# Patient Record
Sex: FEMALE | Race: BLACK OR AFRICAN AMERICAN | NOT HISPANIC OR LATINO | Employment: OTHER | URBAN - METROPOLITAN AREA
[De-identification: names, ages, dates, MRNs, and addresses within clinical notes are randomized per-mention and may not be internally consistent; named-entity substitution may affect disease eponyms.]

---

## 2017-01-12 ENCOUNTER — OFFICE VISIT (OUTPATIENT)
Dept: INTERNAL MEDICINE | Facility: CLINIC | Age: 82
End: 2017-01-12
Payer: MEDICARE

## 2017-01-12 VITALS
OXYGEN SATURATION: 95 % | SYSTOLIC BLOOD PRESSURE: 122 MMHG | DIASTOLIC BLOOD PRESSURE: 76 MMHG | RESPIRATION RATE: 18 BRPM | HEIGHT: 63 IN | BODY MASS INDEX: 27.11 KG/M2 | HEART RATE: 58 BPM | WEIGHT: 153 LBS

## 2017-01-12 DIAGNOSIS — G30.9 ALZHEIMER'S DEMENTIA WITHOUT BEHAVIORAL DISTURBANCE, UNSPECIFIED TIMING OF DEMENTIA ONSET: ICD-10-CM

## 2017-01-12 DIAGNOSIS — M19.90 ARTHRITIS: ICD-10-CM

## 2017-01-12 DIAGNOSIS — I10 ESSENTIAL HYPERTENSION: ICD-10-CM

## 2017-01-12 DIAGNOSIS — K21.9 GASTROESOPHAGEAL REFLUX DISEASE, ESOPHAGITIS PRESENCE NOT SPECIFIED: Primary | ICD-10-CM

## 2017-01-12 DIAGNOSIS — F02.80 ALZHEIMER'S DEMENTIA WITHOUT BEHAVIORAL DISTURBANCE, UNSPECIFIED TIMING OF DEMENTIA ONSET: ICD-10-CM

## 2017-01-12 DIAGNOSIS — F32.A DEPRESSION, UNSPECIFIED DEPRESSION TYPE: ICD-10-CM

## 2017-01-12 PROCEDURE — 99214 OFFICE O/P EST MOD 30 MIN: CPT | Mod: 25,S$GLB,, | Performed by: INTERNAL MEDICINE

## 2017-01-12 PROCEDURE — G0008 ADMIN INFLUENZA VIRUS VAC: HCPCS | Mod: S$GLB,,, | Performed by: INTERNAL MEDICINE

## 2017-01-12 PROCEDURE — 90662 IIV NO PRSV INCREASED AG IM: CPT | Mod: S$GLB,,, | Performed by: INTERNAL MEDICINE

## 2017-01-12 RX ORDER — ESOMEPRAZOLE MAGNESIUM 40 MG/1
40 GRANULE, DELAYED RELEASE ORAL DAILY
COMMUNITY
End: 2017-01-12 | Stop reason: SDUPTHER

## 2017-01-12 RX ORDER — ESOMEPRAZOLE MAGNESIUM 40 MG/1
40 GRANULE, DELAYED RELEASE ORAL DAILY
Qty: 90 EACH | Refills: 3 | Status: SHIPPED | OUTPATIENT
Start: 2017-01-12 | End: 2017-04-11 | Stop reason: SDUPTHER

## 2017-01-12 RX ORDER — HYDROCODONE BITARTRATE AND ACETAMINOPHEN 10; 325 MG/1; MG/1
1 TABLET ORAL EVERY 4 HOURS PRN
Qty: 30 TABLET | Refills: 0 | Status: SHIPPED | OUTPATIENT
Start: 2017-01-12 | End: 2017-08-07

## 2017-01-12 RX ORDER — MEMANTINE HYDROCHLORIDE 10 MG/1
10 TABLET ORAL 2 TIMES DAILY
Qty: 180 TABLET | Refills: 1 | Status: SHIPPED | OUTPATIENT
Start: 2017-01-12 | End: 2017-06-27

## 2017-01-12 RX ORDER — LOSARTAN POTASSIUM 50 MG/1
50 TABLET ORAL DAILY
Qty: 90 TABLET | Refills: 3 | Status: SHIPPED | OUTPATIENT
Start: 2017-01-12 | End: 2017-04-11 | Stop reason: SDUPTHER

## 2017-01-12 RX ORDER — ESCITALOPRAM OXALATE 20 MG/1
20 TABLET ORAL DAILY
Qty: 90 TABLET | Refills: 3 | Status: SHIPPED | OUTPATIENT
Start: 2017-01-12 | End: 2017-04-11 | Stop reason: SDUPTHER

## 2017-01-12 RX ORDER — DONEPEZIL HYDROCHLORIDE 10 MG/1
10 TABLET, FILM COATED ORAL 2 TIMES DAILY
Qty: 180 TABLET | Refills: 0 | Status: SHIPPED | OUTPATIENT
Start: 2017-01-12 | End: 2017-02-08 | Stop reason: SDUPTHER

## 2017-01-12 RX ORDER — NAPROXEN SODIUM 220 MG/1
81 TABLET, FILM COATED ORAL DAILY
Start: 2017-01-12 | End: 2022-03-07

## 2017-01-12 NOTE — PROGRESS NOTES
"Subjective:      Patient ID: Lesa Holder is a 89 y.o. female.    Chief Complaint: Est PCP    HPI: 89y/oAAF, pt. Of Dr. Hdz, seen by Dr. Wallace in the past.  Presents to [facility].  I have reviewed her chart and labs.  She carries the DX: HTN,Depression,GERD.  At present has no complaints.  She walks with a cane for stability.    Review of Systems   Constitutional: Negative.    HENT: Negative.    Eyes: Negative.    Respiratory: Negative.    Cardiovascular: Negative.    Gastrointestinal: Negative.  Negative for anal bleeding and blood in stool.        GERD with significant nocturnal distress.   Endocrine: Negative.    Genitourinary: Negative.    Musculoskeletal: Positive for arthralgias.   Skin: Negative.    Neurological: Negative.    Hematological: Negative.    Psychiatric/Behavioral: Negative.        Objective:     Visit Vitals    /76 (BP Location: Right arm, Patient Position: Sitting, BP Method: Manual)    Pulse (!) 58    Resp 18    Ht 5' 3" (1.6 m)    Wt 69.4 kg (153 lb)    SpO2 95%    BMI 27.1 kg/m2       Physical Exam   Constitutional: She is oriented to person, place, and time. She appears well-developed and well-nourished.   HENT:   Head: Normocephalic and atraumatic.   Right Ear: External ear normal.   Left Ear: External ear normal.   Nose: Nose normal.   Mouth/Throat: Oropharynx is clear and moist.   Upper/lower dentures   Eyes: Conjunctivae are normal. Pupils are equal, round, and reactive to light.   Neck: Normal range of motion. Neck supple.   Cardiovascular: Normal rate, regular rhythm and normal heart sounds.    Pulmonary/Chest: Effort normal and breath sounds normal.   Abdominal: Soft. Bowel sounds are normal.   Neurological: She is alert and oriented to person, place, and time.   Skin: Skin is warm and dry.   Psychiatric: She has a normal mood and affect. Her behavior is normal.   Nursing note and vitals reviewed.      Assessment:     1. Gastroesophageal reflux disease, " esophagitis presence not specified    2. Essential hypertension    3. Depression, unspecified depression type    4. Alzheimer's dementia without behavioral disturbance, unspecified timing of dementia onset    5. Arthritis      Plan:     Gastroesophageal reflux disease, esophagitis presence not specified  -     esomeprazole (NEXIUM) 40 mg GrPS; Take 40 mg by mouth once daily.  Dispense: 90 each; Refill: 3    Essential hypertension  -     losartan (COZAAR) 50 MG tablet; Take 1 tablet (50 mg total) by mouth once daily.  Dispense: 90 tablet; Refill: 3  -     aspirin 81 MG Chew; Take 1 tablet (81 mg total) by mouth once daily.    Depression, unspecified depression type  -     escitalopram oxalate (LEXAPRO) 20 MG tablet; Take 1 tablet (20 mg total) by mouth once daily.  Dispense: 90 tablet; Refill: 3    Alzheimer's dementia without behavioral disturbance, unspecified timing of dementia onset  -     donepezil (ARICEPT) 10 MG tablet; Take 1 tablet (10 mg total) by mouth 2 (two) times daily.  Dispense: 180 tablet; Refill: 0  -     memantine (NAMENDA) 10 MG Tab; Take 1 tablet (10 mg total) by mouth 2 (two) times daily.  Dispense: 180 tablet; Refill: 1  -     aspirin 81 MG Chew; Take 1 tablet (81 mg total) by mouth once daily.    Arthritis  -     hydrocodone-acetaminophen 10-325mg (NORCO)  mg Tab; Take 1 tablet by mouth every 4 (four) hours as needed for Pain.  Dispense: 30 tablet; Refill: 0    Other orders  -     Influenza - High Dose (65+) (PF) (IM)  -     Influenza - High Dose (65+) (PF) (IM)

## 2017-01-12 NOTE — MR AVS SNAPSHOT
[facility]  [address],  [address]  [place] [address]  Phone: [phone]  Fax: [phone]                  Lesa Holder   2017 10:00 AM   Office Visit    Description:  Female : 10/8/1927   Provider:  Miriam Saunders MD   Department:  [facility]           Reason for Visit     Est PCP           Diagnoses this Visit        Comments    Gastroesophageal reflux disease, esophagitis presence not specified    -  Primary     Essential hypertension         Depression, unspecified depression type         Alzheimer's dementia without behavioral disturbance, unspecified timing of dementia onset         Arthritis                To Do List           Future Appointments        Provider Department Dept Phone    2017 2:40 PM Miriam Saunders MD [facility] [phone]      Goals (5 Years of Data)     None       These Medications        Disp Refills Start End    donepezil (ARICEPT) 10 MG tablet 180 tablet 0 2017    Take 1 tablet (10 mg total) by mouth 2 (two) times daily. - Oral    Pharmacy: [facility] [address] [place] [address] [address] [facility] [address] Ph #: [phone]       escitalopram oxalate (LEXAPRO) 20 MG tablet 90 tablet 3 2017     Take 1 tablet (20 mg total) by mouth once daily. - Oral    Pharmacy: [facility] [address] [place] [address] [facility] [address] Ph #: [phone]       esomeprazole (NEXIUM) 40 mg GrPS 90 each 3 2017     Take 40 mg by mouth once daily. - Oral    Pharmacy: [facility] [address] [place] [address] [address] [facility] [address] Ph #: [phone]       memantine (NAMENDA) 10 MG Tab 180 tablet 1 2017     Take 1 tablet (10 mg total) by mouth 2 (two) times daily. - Oral    Pharmacy: [facility] [address] [place] [address] [address] [facility] [address] Ph #: [phone]       Notes to Pharmacy: **Patient requests 90 days supply**    losartan (COZAAR) 50 MG tablet 90 tablet 3 1/12/2017     Take 1 tablet (50 mg total) by mouth once daily. - Oral    Pharmacy: [facility] [address] [place] [address] [facility] [address] Ph #: [phone]       aspirin 81 MG Chew   1/12/2017     Take 1 tablet (81 mg total) by mouth once daily. - Oral    Pharmacy: [facility] [place] [address] [facility]  [address] Ph #: [phone]       hydrocodone-acetaminophen 10-325mg (NORCO)  mg Tab 30 tablet 0 1/12/2017     Take 1 tablet by mouth every 4 (four) hours as needed for Pain. - Oral    Pharmacy: [facility] [place] [address] [facility] [address] Ph #: [phone]         [facility] On [facility]     [facility] On Call Nurse Care Line - 24/7 Assistance  Registered nurses in the [facility] On Call Center provide clinical advisement, health education, appointment booking, and other advisory services.  Call for this free service at [phone].             Medications           Message regarding Medications     Verify the changes and/or additions to your medication regime listed below are the same as discussed with your clinician today.  If any of these changes or additions are incorrect, please notify your healthcare provider.        START taking these NEW medications        Refills    esomeprazole (NEXIUM) 40 mg GrPS 3    Sig: Take 40 mg by mouth once daily.    Class: Normal    Route: Oral    aspirin 81 MG Chew     Sig: Take 1 tablet (81 mg total) by mouth once daily.    Class: No Print    Route: Oral      STOP taking these medications     PNV w/o calcium-iron fum-FA (M-VIT) 27-1 mg Tab Take by mouth.    potassium chloride (KLOR-CON) 10 MEQ TbSR TAKE 1 TABLET BY MOUTH EVERY DAY    ipratropium (ATROVENT) 0.06 % nasal spray 2 sprays by Nasal route 2 (two) times  "daily.           Verify that the below list of medications is an accurate representation of the medications you are currently taking.  If none reported, the list may be blank. If incorrect, please contact your healthcare provider. Carry this list with you in case of emergency.           Current Medications     benzonatate (TESSALON) 100 MG capsule Take 200 mg by mouth 2 (two) times daily as needed for Cough.     donepezil (ARICEPT) 10 MG tablet Take 1 tablet (10 mg total) by mouth 2 (two) times daily.    escitalopram oxalate (LEXAPRO) 20 MG tablet Take 1 tablet (20 mg total) by mouth once daily.    esomeprazole (NEXIUM) 40 mg GrPS Take 40 mg by mouth once daily.    hydrocodone-acetaminophen 10-325mg (NORCO)  mg Tab Take 1 tablet by mouth every 4 (four) hours as needed for Pain.    losartan (COZAAR) 50 MG tablet Take 1 tablet (50 mg total) by mouth once daily.    memantine (NAMENDA) 10 MG Tab Take 1 tablet (10 mg total) by mouth 2 (two) times daily.    aspirin 81 MG Chew Take 1 tablet (81 mg total) by mouth once daily.           Clinical Reference Information           Vital Signs - Last Recorded  Most recent update: 1/12/2017 10:00 AM by Sanjana Bowers LPN    BP Pulse Resp Ht Wt SpO2    122/76 (BP Location: Right arm, Patient Position: Sitting, BP Method: Manual) (!) 58 18 5' 3" (1.6 m) 69.4 kg (153 lb) 95%    BMI                27.1 kg/m2          Blood Pressure          Most Recent Value    BP  122/76      Allergies as of 1/12/2017     No Known Allergies      Immunizations Administered on Date of Encounter - 1/12/2017     Name Date Dose VIS Date Route    Influenza - High Dose  Incomplete 0.5 mL 8/7/2015 Intramuscular      Orders Placed During Today's Visit      Normal Orders This Visit    Influenza - High Dose (65+) (PF) (IM)       "

## 2017-02-08 ENCOUNTER — OFFICE VISIT (OUTPATIENT)
Dept: PSYCHIATRY | Facility: CLINIC | Age: 82
End: 2017-02-08
Payer: MEDICARE

## 2017-02-08 VITALS
DIASTOLIC BLOOD PRESSURE: 77 MMHG | HEART RATE: 70 BPM | WEIGHT: 150 LBS | SYSTOLIC BLOOD PRESSURE: 184 MMHG | HEIGHT: 63 IN | BODY MASS INDEX: 26.58 KG/M2

## 2017-02-08 DIAGNOSIS — F02.80 ALZHEIMER'S DEMENTIA WITHOUT BEHAVIORAL DISTURBANCE, UNSPECIFIED TIMING OF DEMENTIA ONSET: ICD-10-CM

## 2017-02-08 DIAGNOSIS — G30.9 ALZHEIMER'S DEMENTIA WITHOUT BEHAVIORAL DISTURBANCE, UNSPECIFIED TIMING OF DEMENTIA ONSET: ICD-10-CM

## 2017-02-08 DIAGNOSIS — F33.41 MAJOR DEPRESSIVE DISORDER, RECURRENT, IN PARTIAL REMISSION: Primary | ICD-10-CM

## 2017-02-08 PROCEDURE — 99213 OFFICE O/P EST LOW 20 MIN: CPT | Mod: PBBFAC | Performed by: PSYCHIATRY & NEUROLOGY

## 2017-02-08 PROCEDURE — 99214 OFFICE O/P EST MOD 30 MIN: CPT | Mod: S$PBB,,, | Performed by: PSYCHIATRY & NEUROLOGY

## 2017-02-08 PROCEDURE — 99999 PR PBB SHADOW E&M-EST. PATIENT-LVL III: CPT | Mod: PBBFAC,,, | Performed by: PSYCHIATRY & NEUROLOGY

## 2017-02-08 RX ORDER — IPRATROPIUM BROMIDE 42 UG/1
2 SPRAY, METERED NASAL 4 TIMES DAILY
COMMUNITY
End: 2017-08-07

## 2017-02-08 RX ORDER — DONEPEZIL HYDROCHLORIDE 10 MG/1
10 TABLET, FILM COATED ORAL 2 TIMES DAILY
Qty: 180 TABLET | Refills: 0 | Status: SHIPPED | OUTPATIENT
Start: 2017-02-08 | End: 2017-06-27 | Stop reason: SDUPTHER

## 2017-02-08 NOTE — PROGRESS NOTES
"Outpatient Psychiatry Follow-Up Visit (MD/NP)    2/8/2017    Clinical Status of Patient:  Outpatient (Ambulatory)    Chief Complaint:  Lesa Holder is a 89 y.o. female who presents today for follow-up of psychosis, depression, and dementia.     Met with patient and spouse.      Interval History and Content of Current Session:  Interim Events/Subjective Report/Content of Current Session:   Pt reports she has been doing pretty good.  She reports the holidays went well.  She reports her mood has been "pretty good" but not as it would normally be.  She describes isolating herself some.   She reports the death of her son about one year ago still affects her.  She denies spending a great deal of time in bed.  She denies crying.  Appetite is good.  She reports continued memory problems, not uncommonly where she put things.      She denies hallucinations or odd experiences when awake.       reports some days she is good and others not. She sometimes says there is a problem with the mattress.  He says she cooks some and they go out every other day to eat.  She disputes this and states she cooks every other day.      Though aricept was changed she continues to cough.  She has been diagnosed with GERD.          Psychotherapy:  · Target symptoms: depression, psychosis, memory loss  · Why chosen therapy is appropriate versus another modality: relevant to diagnosis  · Outcome monitoring methods: self-report, feedback from family  · Therapeutic intervention type: supportive psychotherapy  · Topics discussed/themes: stress related to medical comorbidities, life stage transitional issues  · The patient's response to the intervention is accepting. The patient's progress toward treatment goals is good.   · Duration of intervention: 10    Review of Systems   · PSYCHIATRIC: Pertinant items are noted in the narrative.  · CONSTITUTIONAL: No weight gain or loss.   · RESPIRATORY: No shortness of breath. Positive for " "cough.  · CARDIOVASCULAR: No tachycardia or chest pain.  · GASTROINTESTINAL: No nausea, vomiting, pain, constipation or diarrhea. Positive for diarrhea.  · GENITOURINARY: No frequency, dysuria or sexual dysfunction.    Past Medical, Family and Social History: The patient's past medical, family and social history have been reviewed and updated as appropriate within the electronic medical record - see encounter notes.    Compliance:  As above    Side effects: None    Risk Parameters:  Patient reports no suicidal ideation  Patient reports no homicidal ideation  Patient reports no self-injurious behavior  Patient reports no violent behavior    Exam (detailed: at least 9 elements; comprehensive: all 15 elements)   Constitutional  Vitals:  Most recent vital signs, dated less than 90 days prior to this appointment, were not reviewed.    Vitals:    02/08/17 1305   BP: (!) 184/77   Pulse: 70   Weight: 68 kg (150 lb)   Height: 5' 3" (1.6 m)      General:  age appropriate, neatly groomed, overweight     Musculoskeletal  Muscle Strength/Tone:  no dyskinesia, no dystonia, no tremor   Gait & Station:  uses cane     Psychiatric  Speech:  normal tone, normal rate, normal pitch, normal volume, no slurring, not pressured   Mood  Affect:  euthymic  mildly constricted but mostly bright   Thought Process:  goal-directed, logical   Associations:  intact   Thought Content:  normal, no suicidality, no homicidality, delusions, or paranoia   Insight  Judgement:  good  adequate to circumstances   Orientation:  Does not recall name of hospital. [facility].  Struggles with year but get it eventually.  Not oriented to month.  Oriented to day of the week and time of day   Memory: recalls cereal this AM but cannot remember "Cherios"   Language: grossly intact   Attention Span & Concentration:  able to focus   Fund of Knowledge:  intact and appropriate to age and level of education     Assessment and Diagnosis   Status/Progress: Based on the " examination today, the patient's problem(s) is/are fairly inadequately controlled.  New problems have not been presented today.   Comorbidities are complicating management of the primary condition.  There are no active rule-out diagnoses for this patient at this time.    General Impression: the patient had no prior psychiatric history [facility]  During that time she was confused and depressed.  Subsequent to her discharge an attempt was made to take her off of antipsychotic medication which resulted in her developing delusions of that she was sexually arousing strange men.  Currently she is on medication regimen that includes antidepressants as well as memory preserving medications.    Diagnosis:  Hansel. 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  hyperlipidemia, hypertension, leg pain        Intervention/Counseling/Treatment Plan   · Continue Aricept 10 mg twice daily  · Continue Lexapro 20 mg daily  · Continue Namenda 10 mg twice daily.          Return to Clinic: 6 months

## 2017-04-11 ENCOUNTER — OFFICE VISIT (OUTPATIENT)
Dept: INTERNAL MEDICINE | Facility: CLINIC | Age: 82
End: 2017-04-11
Payer: MEDICARE

## 2017-04-11 VITALS
WEIGHT: 149.25 LBS | BODY MASS INDEX: 29.3 KG/M2 | HEIGHT: 60 IN | SYSTOLIC BLOOD PRESSURE: 130 MMHG | OXYGEN SATURATION: 97 % | TEMPERATURE: 98 F | DIASTOLIC BLOOD PRESSURE: 80 MMHG | HEART RATE: 66 BPM | RESPIRATION RATE: 16 BRPM

## 2017-04-11 DIAGNOSIS — F32.A DEPRESSION, UNSPECIFIED DEPRESSION TYPE: ICD-10-CM

## 2017-04-11 DIAGNOSIS — G30.9 ALZHEIMER'S DEMENTIA WITHOUT BEHAVIORAL DISTURBANCE, UNSPECIFIED TIMING OF DEMENTIA ONSET: ICD-10-CM

## 2017-04-11 DIAGNOSIS — I10 ESSENTIAL HYPERTENSION: Primary | ICD-10-CM

## 2017-04-11 DIAGNOSIS — E78.5 HYPERLIPIDEMIA, UNSPECIFIED HYPERLIPIDEMIA TYPE: ICD-10-CM

## 2017-04-11 DIAGNOSIS — K21.9 GASTROESOPHAGEAL REFLUX DISEASE, ESOPHAGITIS PRESENCE NOT SPECIFIED: ICD-10-CM

## 2017-04-11 DIAGNOSIS — F02.80 ALZHEIMER'S DEMENTIA WITHOUT BEHAVIORAL DISTURBANCE, UNSPECIFIED TIMING OF DEMENTIA ONSET: ICD-10-CM

## 2017-04-11 PROCEDURE — 99214 OFFICE O/P EST MOD 30 MIN: CPT | Mod: S$GLB,,, | Performed by: INTERNAL MEDICINE

## 2017-04-11 RX ORDER — ESCITALOPRAM OXALATE 20 MG/1
20 TABLET ORAL DAILY
Qty: 90 TABLET | Refills: 3 | Status: SHIPPED | OUTPATIENT
Start: 2017-04-11 | End: 2018-03-08 | Stop reason: SDUPTHER

## 2017-04-11 RX ORDER — ESOMEPRAZOLE MAGNESIUM 40 MG/1
40 GRANULE, DELAYED RELEASE ORAL DAILY
Qty: 90 EACH | Refills: 3 | Status: SHIPPED | OUTPATIENT
Start: 2017-04-11 | End: 2017-08-07

## 2017-04-11 RX ORDER — ESOMEPRAZOLE MAGNESIUM 40 MG/1
1 CAPSULE, DELAYED RELEASE ORAL DAILY
Refills: 3 | COMMUNITY
Start: 2017-01-12 | End: 2018-04-09 | Stop reason: SDUPTHER

## 2017-04-11 RX ORDER — LOSARTAN POTASSIUM 50 MG/1
50 TABLET ORAL DAILY
Qty: 90 TABLET | Refills: 3 | Status: SHIPPED | OUTPATIENT
Start: 2017-04-11 | End: 2018-11-02 | Stop reason: SDUPTHER

## 2017-04-11 NOTE — PROGRESS NOTES
Subjective:      Patient ID: Lesa Holder is a 89 y.o. female.    Chief Complaint: Cough (pt c/o coughing); Nasal Congestion; and Gastroesophageal Reflux (acid reflux)    HPI: 89y/oAAF, here with her .  He puts her meds out, because she has memory impairment.  However, he states that she sometimes does not take her Nexium, and then has heartburn with backwash  Which makes her cough. Cough is not productive or debilitating.  No CP,palpiatations,SOB,wheezing.  She does not want to take ANY MORE pills than she is.  She does her own cooking and house cleaning, but likes to sit in her recliner.  03/28/13 0922 HDL 66 - Final result   03/28/13 0922 CHOL 229 High Final result   03/28/13 0922 TRIG 77 - Final result   03/28/13 0922 LDLCALC 148.0 - Final result   03/28/13 0922 CHOLHDL 28.8 - Final result   03/28/13 0922 TOTALCHOLEST 3.5 - Final result   03/01/17 1118 COLORU Yellow - Final result   03/01/17 1118 APPEARANCEUA Clear - Final result   03/01/17 1118 SPECGRAV 1.025 - Final result   03/01/17 1118 PHUR 6.0 - Final result   03/01/17 1118 KETONESU Trace Abnormal Final result   03/01/17 1118 OCCULTUA Trace Abnormal Final result   03/01/17 1118 NITRITE Negative - Final result   03/01/17 1118 UROBILINOGEN Negative - Final result   08/12/16 1723 INR 1.1 - Final result   03/01/17 1118 LEUKOCYTESUR Trace Abnormal Final result   03/01/17 1118 WBC 5.86 - Final result   03/01/17 1118 RBC 4.08 - Final result   03/01/17 1118 HGB 12.0 - Final result   03/01/17 1118 HCT 36.9 Low Final result   03/01/17 1118 MCH 29.4 - Final result   03/01/17 1118 RDW 13.4 - Final result   03/01/17 1118  - Final result   03/01/17 1118 MPV 9.0 Low Final result   03/01/17 1118 GLU 87 - Final result   03/01/17 1118 BUN 19 High Final result   03/01/17 1118 CREATININE 0.82 - Final result   03/01/17 1118 CALCIUM 9.6 - Final result   03/01/17 1118  - Final result   03/01/17 1118 K 4.2 - Final result   03/01/17 1118  - Final result    03/01/17 1118 PROT 7.7 - Final result   03/01/17 1118 ALBUMIN 3.9 - Final result   03/01/17 1118 BILITOT 0.6 - Final result   03/01/17 1118 AST 31 - Final result   03/01/17 1118 ALKPHOS 91 - Final result   03/01/17 1118 CO2 30 High Final result   03/01/17 1118 ALT 19 - Final result   03/01/17 1118 ANIONGAP 10 - Final result   03/01/17 1118 EGFRNONAA >60.0 - Final result   03/01/17 1118 ESTGFRAFRICA >60.0 - Final result   09/24/11 1944 MG 2.3 - Final result   03/01/17 1118           Review of Systems   Constitutional: Negative.    HENT: Negative.    Eyes: Negative.    Respiratory: Negative.    Cardiovascular: Negative.    Gastrointestinal: Negative.    Endocrine: Negative.    Genitourinary: Negative.    Musculoskeletal:        Legs hurt sometimes   Allergic/Immunologic: Negative.    Neurological: Negative.    Psychiatric/Behavioral: Positive for confusion and decreased concentration. Negative for agitation, behavioral problems, hallucinations, self-injury, sleep disturbance and suicidal ideas. The patient is not hyperactive.        Objective:   /80 (BP Location: Left arm, Patient Position: Sitting, BP Method: Manual)  Pulse 66  Temp 98.2 °F (36.8 °C) (Oral)   Resp 16  Ht 5' (1.524 m)  Wt 67.7 kg (149 lb 4 oz)  SpO2 97%  BMI 29.15 kg/m2    Physical Exam   Constitutional: She is oriented to person, place, and time. She appears well-developed and well-nourished.   HENT:   Head: Normocephalic and atraumatic.   Right Ear: External ear normal.   Left Ear: External ear normal.   Nose: Nose normal.   Mouth/Throat: Oropharynx is clear and moist.   Eyes: Conjunctivae are normal. Pupils are equal, round, and reactive to light.   Neck: Normal range of motion. Neck supple.   Cardiovascular: Normal rate, regular rhythm and normal heart sounds.    Pulmonary/Chest: Effort normal and breath sounds normal.   Abdominal: Soft. Bowel sounds are normal.   Musculoskeletal: She exhibits no edema or tenderness.   Neurological:  She is alert and oriented to person, place, and time.   Skin: Skin is warm and dry.   Psychiatric: She has a normal mood and affect. Her behavior is normal.   Nursing note and vitals reviewed.      Assessment:     1. Essential hypertension    2. Gastroesophageal reflux disease, esophagitis presence not specified    3. Depression, unspecified depression type    4. Hyperlipidemia, unspecified hyperlipidemia type    5. Alzheimer's dementia without behavioral disturbance, unspecified timing of dementia onset      Plan:     Essential hypertension  -     losartan (COZAAR) 50 MG tablet; Take 1 tablet (50 mg total) by mouth once daily.  Dispense: 90 tablet; Refill: 3    Gastroesophageal reflux disease, esophagitis presence not specified  -     esomeprazole (NEXIUM) 40 mg GrPS; Take 40 mg by mouth once daily.  Dispense: 90 each; Refill: 3    Depression, unspecified depression type  -     escitalopram oxalate (LEXAPRO) 20 MG tablet; Take 1 tablet (20 mg total) by mouth once daily.  Dispense: 90 tablet; Refill: 3    Hyperlipidemia, unspecified hyperlipidemia type    Alzheimer's dementia without behavioral disturbance, unspecified timing of dementia onset      Pt. Does not want to take any more pills than she is now.

## 2017-04-11 NOTE — MR AVS SNAPSHOT
[facility]  [address],  [address]  [place] [address]  Phone: [phone]  Fax: [phone]                  Lesa Holder   2017 2:40 PM   Office Visit    Description:  Female : 10/8/1927   Provider:  Miriam Saunders MD   Department:  [facility]           Reason for Visit     Cough     Nasal Congestion     Gastroesophageal Reflux           Diagnoses this Visit        Comments    Essential hypertension         Gastroesophageal reflux disease, esophagitis presence not specified         Depression, unspecified depression type                To Do List           Future Appointments        Provider Department Dept Phone    2017 2:00 PM Miriam Saunders MD [facility] [phone]      Goals (5 Years of Data)     None       These Medications        Disp Refills Start End    losartan (COZAAR) 50 MG tablet 90 tablet 3 2017     Take 1 tablet (50 mg total) by mouth once daily. - Oral    Pharmacy: [facility] [place] [address] [address] [facility]  [address] Ph #: [phone]       esomeprazole (NEXIUM) 40 mg GrPS 90 each 3 2017     Take 40 mg by mouth once daily. - Oral    Pharmacy: [facility] [place] [address] [address] [facility] [address] Ph #: [phone]       escitalopram oxalate (LEXAPRO) 20 MG tablet 90 tablet 3 2017     Take 1 tablet (20 mg total) by mouth once daily. - Oral    Pharmacy: [facility] [place] [address] [address] AT [facility] [address] Ph #: [phone]         [facility]     [facility] Nurse Care Line -  Assistance  Unless otherwise directed by your provider, please contact [facility], our nurse care line that is available for  assistance.     Registered nurses in the [facility] provide: appointment scheduling, clinical advisement, health  education, and other advisory services.  Call: [phone] (toll free)               Medications           Message regarding Medications     Verify the changes and/or additions to your medication regime listed below are the same as discussed with your clinician today.  If any of these changes or additions are incorrect, please notify your healthcare provider.             Verify that the below list of medications is an accurate representation of the medications you are currently taking.  If none reported, the list may be blank. If incorrect, please contact your healthcare provider. Carry this list with you in case of emergency.           Current Medications     aspirin 81 MG Chew Take 1 tablet (81 mg total) by mouth once daily.    benzonatate (TESSALON) 100 MG capsule Take 200 mg by mouth 2 (two) times daily as needed for Cough.     donepezil (ARICEPT) 10 MG tablet Take 1 tablet (10 mg total) by mouth 2 (two) times daily.    escitalopram oxalate (LEXAPRO) 20 MG tablet Take 1 tablet (20 mg total) by mouth once daily.    esomeprazole (NEXIUM) 40 MG capsule Take 1 capsule by mouth Daily.    esomeprazole (NEXIUM) 40 mg GrPS Take 40 mg by mouth once daily.    hydrocodone-acetaminophen 10-325mg (NORCO)  mg Tab Take 1 tablet by mouth every 4 (four) hours as needed for Pain.    ipratropium (ATROVENT) 0.06 % nasal spray 2 sprays by Nasal route 4 (four) times daily.    losartan (COZAAR) 50 MG tablet Take 1 tablet (50 mg total) by mouth once daily.    memantine (NAMENDA) 10 MG Tab Take 1 tablet (10 mg total) by mouth 2 (two) times daily.           Clinical Reference Information           Your Vitals Were     BP Pulse Temp Resp Height Weight    130/80 (BP Location: Left arm, Patient Position: Sitting, BP Method: Manual) 66 98.2 °F (36.8 °C) (Oral) 16 5' (1.524 m) 67.7 kg (149 lb 4 oz)    SpO2 BMI             97% 29.15 kg/m2         Blood Pressure          Most Recent Value    BP  130/80      Allergies as of 4/11/2017      No Known Allergies      Immunizations Administered on Date of Encounter - 4/11/2017     None      [facility] Sign-Up     Activating your [facility] account is as easy as 1-2-3!     1) Visit [email or web address], select Sign Up Now, enter this activation code and your date of birth, then select Next.  Activation code not generated  Current Patient Portal Status: Account disabled      2) Create a username and password to use when you visit [facility] in the future and select a security question in case you lose your password and select Next.    3) Enter your e-mail address and click Sign Up!    Additional Information  If you have questions, please e-mail [email or web address] or call [phone] to talk to our [facility] staff. Remember, [employer] is NOT to be used for urgent needs. For medical emergencies, dial 911.         Language Assistance Services     ATTENTION: Language assistance services are available, free of charge. Please call [phone].      ATENCIÓN: Si pernell saul, tiene a decker disposición servicios gratuitos de asistencia lingüística. Llame al [phone].     [facility] Ý: N?u b?n nói Ti?ng Vi?t, có các d?ch v? h? tr? ngôn ng? mi?n phí dành cho b?n. G?i s? [phone].         [facility] complies with applicable Federal civil rights laws and does not discriminate on the basis of race, color, national origin, age, disability, or sex.

## 2017-06-26 RX ORDER — MEMANTINE HYDROCHLORIDE 10 MG/1
TABLET ORAL
Qty: 60 TABLET | Refills: 0 | Status: SHIPPED | OUTPATIENT
Start: 2017-06-26 | End: 2017-06-26 | Stop reason: SDUPTHER

## 2017-06-26 RX ORDER — MEMANTINE HYDROCHLORIDE 10 MG/1
TABLET ORAL
Qty: 180 TABLET | Refills: 0 | Status: SHIPPED | OUTPATIENT
Start: 2017-06-26 | End: 2017-06-27 | Stop reason: SDUPTHER

## 2017-06-27 ENCOUNTER — OFFICE VISIT (OUTPATIENT)
Dept: PSYCHIATRY | Facility: CLINIC | Age: 82
End: 2017-06-27
Payer: MEDICARE

## 2017-06-27 VITALS
HEIGHT: 60 IN | SYSTOLIC BLOOD PRESSURE: 171 MMHG | DIASTOLIC BLOOD PRESSURE: 117 MMHG | WEIGHT: 152 LBS | HEART RATE: 84 BPM | BODY MASS INDEX: 29.84 KG/M2

## 2017-06-27 DIAGNOSIS — F02.80 ALZHEIMER'S DEMENTIA WITHOUT BEHAVIORAL DISTURBANCE, UNSPECIFIED TIMING OF DEMENTIA ONSET: ICD-10-CM

## 2017-06-27 DIAGNOSIS — F33.41 MAJOR DEPRESSIVE DISORDER, RECURRENT, IN PARTIAL REMISSION: Primary | ICD-10-CM

## 2017-06-27 DIAGNOSIS — G30.9 ALZHEIMER'S DEMENTIA WITHOUT BEHAVIORAL DISTURBANCE, UNSPECIFIED TIMING OF DEMENTIA ONSET: ICD-10-CM

## 2017-06-27 PROCEDURE — 99999 PR PBB SHADOW E&M-EST. PATIENT-LVL II: CPT | Mod: PBBFAC,,, | Performed by: PSYCHIATRY & NEUROLOGY

## 2017-06-27 PROCEDURE — 1159F MED LIST DOCD IN RCRD: CPT | Mod: ,,, | Performed by: PSYCHIATRY & NEUROLOGY

## 2017-06-27 PROCEDURE — 99212 OFFICE O/P EST SF 10 MIN: CPT | Mod: PBBFAC | Performed by: PSYCHIATRY & NEUROLOGY

## 2017-06-27 PROCEDURE — 99214 OFFICE O/P EST MOD 30 MIN: CPT | Mod: S$PBB,,, | Performed by: PSYCHIATRY & NEUROLOGY

## 2017-06-27 RX ORDER — MEMANTINE HYDROCHLORIDE 10 MG/1
10 TABLET ORAL 2 TIMES DAILY
Qty: 180 TABLET | Refills: 1 | Status: SHIPPED | OUTPATIENT
Start: 2017-06-27 | End: 2017-11-07 | Stop reason: SDUPTHER

## 2017-06-27 RX ORDER — DONEPEZIL HYDROCHLORIDE 10 MG/1
10 TABLET, FILM COATED ORAL 2 TIMES DAILY
Qty: 180 TABLET | Refills: 1 | Status: SHIPPED | OUTPATIENT
Start: 2017-06-27 | End: 2017-11-07 | Stop reason: SDUPTHER

## 2017-06-27 NOTE — PROGRESS NOTES
Outpatient Psychiatry Follow-Up Visit (MD/NP)    6/27/2017    Clinical Status of Patient:  Outpatient (Ambulatory)    Chief Complaint:  Lesa Holder is a 89 y.o. female who presents today for follow-up of psychosis, depression, and dementia.     Met with patient and spouse.      Interval History and Content of Current Session:  Interim Events/Subjective Report/Content of Current Session:    Pt report she has been doing well but reports her memory is still giving her problems at times.  She reports she sometimes forgets the date.  She reports her mood is pretty good and this is corroborated by her .  She denies crying and feeling sad.  She reports she spends time doing household chores, [employer], and going out to eat.  Appetite is good.  Sleep is impaired because of reflux and coughing.  She still has some pain from arthritis.        Psychotherapy:  · Target symptoms: depression, psychosis, memory loss  · Why chosen therapy is appropriate versus another modality: relevant to diagnosis  · Outcome monitoring methods: self-report, feedback from family  · Therapeutic intervention type: supportive psychotherapy  · Topics discussed/themes: stress related to medical comorbidities, life stage transitional issues  · The patient's response to the intervention is accepting. The patient's progress toward treatment goals is good.   · Duration of intervention: 5 mins    Review of Systems   · PSYCHIATRIC: Pertinant items are noted in the narrative.  · CONSTITUTIONAL: No weight gain or loss.   · RESPIRATORY: No shortness of breath. Positive for cough.  · CARDIOVASCULAR: No tachycardia or chest pain.  · GASTROINTESTINAL: No nausea, vomiting, pain, constipation or diarrhea.  · GENITOURINARY: No frequency, dysuria or sexual dysfunction.    Past Medical, Family and Social History: The patient's past medical, family and social history have been reviewed and updated as appropriate within the electronic medical record - see  encounter notes.    Compliance:  As above    Side effects: None    Risk Parameters:  Patient reports no suicidal ideation  Patient reports no homicidal ideation  Patient reports no self-injurious behavior  Patient reports no violent behavior    Exam (detailed: at least 9 elements; comprehensive: all 15 elements)   Constitutional  Vitals:  Most recent vital signs, dated less than 90 days prior to this appointment, were not reviewed.    Vitals:    06/27/17 1553   BP: (!) 171/117   Pulse: 84   Weight: 68.9 kg (152 lb)   Height: 5' (1.524 m)      General:  age appropriate, neatly groomed, overweight     Musculoskeletal  Muscle Strength/Tone:  no dyskinesia, no dystonia, no tremor   Gait & Station:  uses cane     Psychiatric  Speech:  normal tone, normal rate, normal pitch, normal volume, no slurring, not pressured   Mood:   Affect:  euthymic  mildly constricted but mostly bright   Thought Process:  goal-directed, logical   Associations:  intact   Thought Content:  normal, no suicidality, no homicidality, delusions, or paranoia   Insight  Judgement:  good  adequate to circumstances   Orientation:  person, place, situation   Memory: intact for some recent events   Language: grossly intact   Attention Span & Concentration:  able to focus   Fund of Knowledge:  intact and appropriate to age and level of education     Assessment and Diagnosis   Status/Progress: Based on the examination today, the patient's problem(s) is/are fairly inadequately controlled.  New problems have not been presented today.   Comorbidities are complicating management of the primary condition.  There are no active rule-out diagnoses for this patient at this time.    General Impression: the patient had no prior psychiatric history before her admission to [facility] in 2011.  During that time she was confused and depressed.  Subsequent to her discharge an attempt was made to take her off of antipsychotic medication which resulted in her developing  delusions of that she was sexually arousing strange men.  Currently she is on medication regimen that includes antidepressants as well as memory preserving medications.    Diagnosis:  Hansel. 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  hyperlipidemia, hypertension, leg pain        Intervention/Counseling/Treatment Plan   · Continue Aricept 10 mg twice daily  · Continue Lexapro 20 mg daily  · Continue Namenda 10 mg twice daily.    · Recommneded an urgent care with PCP if BP does not return to baseline.         Return to Clinic: 6 months

## 2017-08-07 ENCOUNTER — OFFICE VISIT (OUTPATIENT)
Dept: INTERNAL MEDICINE | Facility: CLINIC | Age: 82
End: 2017-08-07
Payer: MEDICARE

## 2017-08-07 VITALS
BODY MASS INDEX: 30.43 KG/M2 | HEART RATE: 72 BPM | RESPIRATION RATE: 16 BRPM | DIASTOLIC BLOOD PRESSURE: 74 MMHG | TEMPERATURE: 99 F | HEIGHT: 60 IN | OXYGEN SATURATION: 97 % | SYSTOLIC BLOOD PRESSURE: 120 MMHG | WEIGHT: 155 LBS

## 2017-08-07 DIAGNOSIS — I10 ESSENTIAL HYPERTENSION: Primary | ICD-10-CM

## 2017-08-07 DIAGNOSIS — G30.9 ALZHEIMER'S DEMENTIA WITHOUT BEHAVIORAL DISTURBANCE, UNSPECIFIED TIMING OF DEMENTIA ONSET: ICD-10-CM

## 2017-08-07 DIAGNOSIS — F02.80 ALZHEIMER'S DEMENTIA WITHOUT BEHAVIORAL DISTURBANCE, UNSPECIFIED TIMING OF DEMENTIA ONSET: ICD-10-CM

## 2017-08-07 PROCEDURE — 3008F BODY MASS INDEX DOCD: CPT | Mod: S$GLB,,, | Performed by: INTERNAL MEDICINE

## 2017-08-07 PROCEDURE — 99213 OFFICE O/P EST LOW 20 MIN: CPT | Mod: S$GLB,,, | Performed by: INTERNAL MEDICINE

## 2017-08-07 PROCEDURE — 1159F MED LIST DOCD IN RCRD: CPT | Mod: S$GLB,,, | Performed by: INTERNAL MEDICINE

## 2017-08-07 PROCEDURE — 1125F AMNT PAIN NOTED PAIN PRSNT: CPT | Mod: S$GLB,,, | Performed by: INTERNAL MEDICINE

## 2017-08-07 RX ORDER — BENZONATATE 200 MG/1
CAPSULE ORAL
COMMUNITY
Start: 2017-07-26 | End: 2017-08-07

## 2017-09-18 DIAGNOSIS — I10 ESSENTIAL HYPERTENSION: ICD-10-CM

## 2017-09-18 RX ORDER — LOSARTAN POTASSIUM 50 MG/1
TABLET ORAL
Qty: 30 TABLET | Refills: 11 | Status: SHIPPED | OUTPATIENT
Start: 2017-09-18 | End: 2017-11-07 | Stop reason: SDUPTHER

## 2017-11-07 ENCOUNTER — OFFICE VISIT (OUTPATIENT)
Dept: INTERNAL MEDICINE | Facility: CLINIC | Age: 82
End: 2017-11-07
Payer: MEDICARE

## 2017-11-07 VITALS
SYSTOLIC BLOOD PRESSURE: 120 MMHG | BODY MASS INDEX: 30.09 KG/M2 | WEIGHT: 153.25 LBS | OXYGEN SATURATION: 98 % | HEIGHT: 60 IN | HEART RATE: 65 BPM | RESPIRATION RATE: 18 BRPM | DIASTOLIC BLOOD PRESSURE: 64 MMHG

## 2017-11-07 DIAGNOSIS — G30.9 ALZHEIMER'S DEMENTIA WITHOUT BEHAVIORAL DISTURBANCE, UNSPECIFIED TIMING OF DEMENTIA ONSET: ICD-10-CM

## 2017-11-07 DIAGNOSIS — F02.80 ALZHEIMER'S DEMENTIA WITHOUT BEHAVIORAL DISTURBANCE, UNSPECIFIED TIMING OF DEMENTIA ONSET: ICD-10-CM

## 2017-11-07 DIAGNOSIS — K21.9 GASTROESOPHAGEAL REFLUX DISEASE, ESOPHAGITIS PRESENCE NOT SPECIFIED: Primary | ICD-10-CM

## 2017-11-07 DIAGNOSIS — R05.3 COUGH, PERSISTENT: ICD-10-CM

## 2017-11-07 PROCEDURE — 99214 OFFICE O/P EST MOD 30 MIN: CPT | Mod: S$GLB,,, | Performed by: INTERNAL MEDICINE

## 2017-11-07 RX ORDER — DONEPEZIL HYDROCHLORIDE 10 MG/1
10 TABLET, FILM COATED ORAL 2 TIMES DAILY
Qty: 180 TABLET | Refills: 1 | Status: SHIPPED | OUTPATIENT
Start: 2017-11-07 | End: 2019-01-07 | Stop reason: SDUPTHER

## 2017-11-07 RX ORDER — SUCRALFATE 1 G/10ML
1 SUSPENSION ORAL NIGHTLY
Qty: 414 ML | Refills: 3 | Status: SHIPPED | OUTPATIENT
Start: 2017-11-07 | End: 2018-02-07

## 2017-11-07 RX ORDER — MEMANTINE HYDROCHLORIDE 10 MG/1
10 TABLET ORAL 2 TIMES DAILY
Qty: 180 TABLET | Refills: 1 | Status: SHIPPED | OUTPATIENT
Start: 2017-11-07 | End: 2018-03-08 | Stop reason: SDUPTHER

## 2017-11-07 NOTE — PATIENT INSTRUCTIONS
Taking Your Blood Pressure  Blood pressure is the force of blood against the artery wall as it moves from the heart through the blood vessels. You can take your own blood pressure reading using a digital monitor. Take your readings the same each time, using the same arm. Take readings as often as your healthcare provider instructs.  About blood pressure monitors  Blood pressure monitors are designed for certain ages and cases. You can find monitors for older adults, for pregnant women, and for children. Make sure the one you choose is the right one for your age and situation.  The American Heart Association recommends an automatic cuff monitor that fits on your upper arm (bicep). The cuff should fit your arm size. A cuff thats too large or too small will not give an accurate reading. Measure around your upper arm to find your size.  Monitors that attach to your finger or wrist are not as accurate as monitors for your upper arm.  Ask your healthcare provider for help in choosing a monitor. Bring your monitor to your next provider visit if you need help in using it the correct way.  The steps below are general instructions for using an automatic digital monitor.  Step 1. Relax    · Take your blood pressure at the same time every day, such as in the morning or evening, or at the time your healthcare provider recommends.  · Wait at least a half-hour after smoking, eating, or exercising. Don't drink coffee, tea, soda, or other caffeinated beverages before checking your blood pressure.  · Sit comfortably at a table with both feet on the floor. Do not cross your legs or feet. Place the monitor near you.  · Rest for a few minutes before you begin.  Step 2. Wrap the cuff    · Place your arm on the table, palm up. Your arm should be at the level of your heart. Wrap the cuff around your upper arm, just above your elbow. Its best done on bare skin, not over clothing. Most cuffs will indicate where the brachial artery (the  blood vessel in the middle of the arm at the inner side of the elbow) should line up with the cuff. Look in your monitor's instruction booklet for an illustration. You can also bring your cuff to your healthcare provider and have them show you how to correctly place the cuff.  Step 3. Inflate the cuff    · Push the button that starts the pump.  · The cuff will tighten, then loosen.  · The numbers will change. When they stop changing, your blood pressure reading will appear.  · Take 2 or 3 readings one minute apart.  Step 4. Write down the results of each reading    · Write down your blood pressure numbers for each reading. Note the date and time. Keep your results in one place, such as a notebook. Even if your monitor has a built-in memory, keep a hard copy of the readings.  · Remove the cuff from your arm. Turn off the machine.  · Bring your blood pressure records with your healthcare providers at each visit.  · If you start a new blood pressure medicine, note the day you started the new medicine. Also note the day if you change the dose of your medicine. This information goes on your blood pressure recording sheet. This will help your healthcare provider monitor how well the medicine changes are working.  · Ask your healthcare provider what numbers should prompt you to call him or her. Also ask what numbers should prompt you to get help right away.  Date Last Reviewed: 11/1/2016  © 9715-4957 [employer]. [address] [place] [address], [place] [address]. All rights reserved. This information is not intended as a substitute for professional medical care. Always follow your healthcare professional's instructions.

## 2017-11-07 NOTE — PROGRESS NOTES
Subjective:      Patient ID: Lesa Holder is a 89 y.o. female.    Chief Complaint: Hypertension; Leg Pain; and Cough    HPI: 89 y.o. Black or  female , here because she has had  A chronic nocturnal cough, at times productive of clear sputum, mostly dry.  Worse when she lays flat.  No fever,wheezing. No SOB, hemoptysis.   States she has heartburn.  Additionally, she states that sometimes her legs hurt behind her knees.  No swelling.      Review of Systems   HENT: Negative.    Eyes: Negative.    Respiratory: Positive for cough. Negative for choking, chest tightness and shortness of breath.    Cardiovascular: Negative.    Gastrointestinal: Negative.    Endocrine: Negative.    Genitourinary: Positive for frequency and urgency.   Musculoskeletal: Positive for joint swelling.        Her RTKP feels like it will give away sometimes.   Skin: Negative.    Neurological: Positive for weakness.   Hematological: Negative.    Psychiatric/Behavioral: Negative.        Objective:   /64 (BP Location: Right arm, Patient Position: Sitting, BP Method: Large (Manual))   Pulse 65   Resp 18   Ht 5' (1.524 m)   Wt 69.5 kg (153 lb 3.5 oz)   SpO2 98%   BMI 29.92 kg/m²     Physical Exam   Constitutional: She appears well-developed and well-nourished.   HENT:   Head: Normocephalic and atraumatic.   Right Ear: External ear normal.   Left Ear: External ear normal.   Nose: Nose normal.   Mouth/Throat: Oropharynx is clear and moist.   Eyes: Conjunctivae and EOM are normal. Pupils are equal, round, and reactive to light.   Neck: Normal range of motion.   Cardiovascular: Normal rate, regular rhythm and normal heart sounds.    Pulmonary/Chest: Effort normal and breath sounds normal.   Abdominal: Soft. Bowel sounds are normal. She exhibits no distension. There is no tenderness. There is no guarding.   Musculoskeletal: Normal range of motion.   Neurological: She is alert.   Skin: Skin is warm and dry.   Psychiatric: She has  a normal mood and affect. Her behavior is normal.   Nursing note and vitals reviewed.      Assessment:     1. Gastroesophageal reflux disease, esophagitis presence not specified    2. Cough, persistent    3. Alzheimer's dementia without behavioral disturbance, unspecified timing of dementia onset      Plan:     Gastroesophageal reflux disease, esophagitis presence not specified  -     sucralfate (CARAFATE) 100 mg/mL suspension; Take 10 mLs (1 g total) by mouth every evening.  Dispense: 414 mL; Refill: 3    Cough, persistent  -     sucralfate (CARAFATE) 100 mg/mL suspension; Take 10 mLs (1 g total) by mouth every evening.  Dispense: 414 mL; Refill: 3    Alzheimer's dementia without behavioral disturbance, unspecified timing of dementia onset  -     memantine (NAMENDA) 10 MG Tab; Take 1 tablet (10 mg total) by mouth 2 (two) times daily.  Dispense: 180 tablet; Refill: 1  -     donepezil (ARICEPT) 10 MG tablet; Take 1 tablet (10 mg total) by mouth 2 (two) times daily.  Dispense: 180 tablet; Refill: 1    cont Nexium.

## 2017-11-20 ENCOUNTER — HOSPITAL ENCOUNTER (INPATIENT)
Facility: HOSPITAL | Age: 82
LOS: 14 days | Discharge: HOME HEALTH | DRG: 329 | End: 2017-12-04
Attending: STUDENT IN AN ORGANIZED HEALTH CARE EDUCATION/TRAINING PROGRAM | Admitting: STUDENT IN AN ORGANIZED HEALTH CARE EDUCATION/TRAINING PROGRAM
Payer: MEDICARE

## 2017-11-20 DIAGNOSIS — R79.89 ELEVATED TROPONIN: ICD-10-CM

## 2017-11-20 DIAGNOSIS — R94.31 ABNORMAL EKG: ICD-10-CM

## 2017-11-20 DIAGNOSIS — K56.609 INTESTINAL OBSTRUCTION: ICD-10-CM

## 2017-11-20 DIAGNOSIS — K56.609 INTESTINAL OBSTRUCTION, UNSPECIFIED CAUSE, UNSPECIFIED WHETHER PARTIAL OR COMPLETE: ICD-10-CM

## 2017-11-20 DIAGNOSIS — K56.609 SBO (SMALL BOWEL OBSTRUCTION): ICD-10-CM

## 2017-11-20 DIAGNOSIS — K56.609 BOWEL OBSTRUCTION: Primary | ICD-10-CM

## 2017-11-20 PROCEDURE — 11000001 HC ACUTE MED/SURG PRIVATE ROOM

## 2017-11-20 PROCEDURE — 25000003 PHARM REV CODE 250: Performed by: STUDENT IN AN ORGANIZED HEALTH CARE EDUCATION/TRAINING PROGRAM

## 2017-11-20 PROCEDURE — 94761 N-INVAS EAR/PLS OXIMETRY MLT: CPT

## 2017-11-20 PROCEDURE — 63600175 PHARM REV CODE 636 W HCPCS: Performed by: STUDENT IN AN ORGANIZED HEALTH CARE EDUCATION/TRAINING PROGRAM

## 2017-11-20 RX ORDER — ACETAMINOPHEN 325 MG/1
650 TABLET ORAL EVERY 8 HOURS PRN
Status: DISCONTINUED | OUTPATIENT
Start: 2017-11-20 | End: 2017-12-04 | Stop reason: HOSPADM

## 2017-11-20 RX ORDER — ENOXAPARIN SODIUM 100 MG/ML
40 INJECTION SUBCUTANEOUS
Status: DISCONTINUED | OUTPATIENT
Start: 2017-11-20 | End: 2017-12-04 | Stop reason: HOSPADM

## 2017-11-20 RX ORDER — ONDANSETRON 8 MG/1
8 TABLET, ORALLY DISINTEGRATING ORAL EVERY 8 HOURS PRN
Status: DISCONTINUED | OUTPATIENT
Start: 2017-11-20 | End: 2017-12-04 | Stop reason: HOSPADM

## 2017-11-20 RX ORDER — SODIUM CHLORIDE, SODIUM LACTATE, POTASSIUM CHLORIDE, CALCIUM CHLORIDE 600; 310; 30; 20 MG/100ML; MG/100ML; MG/100ML; MG/100ML
INJECTION, SOLUTION INTRAVENOUS CONTINUOUS
Status: DISCONTINUED | OUTPATIENT
Start: 2017-11-20 | End: 2017-11-21

## 2017-11-20 RX ORDER — RAMELTEON 8 MG/1
8 TABLET ORAL NIGHTLY PRN
Status: DISCONTINUED | OUTPATIENT
Start: 2017-11-20 | End: 2017-12-04 | Stop reason: HOSPADM

## 2017-11-20 RX ORDER — DIPHENHYDRAMINE HYDROCHLORIDE 50 MG/ML
25 INJECTION INTRAMUSCULAR; INTRAVENOUS EVERY 4 HOURS PRN
Status: DISCONTINUED | OUTPATIENT
Start: 2017-11-20 | End: 2017-12-04 | Stop reason: HOSPADM

## 2017-11-20 RX ORDER — SODIUM CHLORIDE 0.9 % (FLUSH) 0.9 %
3 SYRINGE (ML) INJECTION
Status: DISCONTINUED | OUTPATIENT
Start: 2017-11-20 | End: 2017-12-04 | Stop reason: HOSPADM

## 2017-11-20 RX ADMIN — SODIUM CHLORIDE, SODIUM LACTATE, POTASSIUM CHLORIDE, AND CALCIUM CHLORIDE: .6; .31; .03; .02 INJECTION, SOLUTION INTRAVENOUS at 08:11

## 2017-11-20 RX ADMIN — ACETAMINOPHEN 650 MG: 325 TABLET ORAL at 10:11

## 2017-11-20 RX ADMIN — ENOXAPARIN SODIUM 40 MG: 100 INJECTION SUBCUTANEOUS at 11:11

## 2017-11-20 RX ADMIN — ONDANSETRON 8 MG: 8 TABLET, ORALLY DISINTEGRATING ORAL at 08:11

## 2017-11-21 LAB
ANION GAP SERPL CALC-SCNC: 10 MMOL/L
BASOPHILS # BLD AUTO: 0.01 K/UL
BASOPHILS NFR BLD: 0.1 %
BUN SERPL-MCNC: 15 MG/DL
CALCIUM SERPL-MCNC: 8.7 MG/DL
CHLORIDE SERPL-SCNC: 102 MMOL/L
CO2 SERPL-SCNC: 23 MMOL/L
CREAT SERPL-MCNC: 0.8 MG/DL
DIFFERENTIAL METHOD: ABNORMAL
EOSINOPHIL # BLD AUTO: 0 K/UL
EOSINOPHIL NFR BLD: 0 %
ERYTHROCYTE [DISTWIDTH] IN BLOOD BY AUTOMATED COUNT: 13.7 %
EST. GFR  (AFRICAN AMERICAN): >60 ML/MIN/1.73 M^2
EST. GFR  (NON AFRICAN AMERICAN): >60 ML/MIN/1.73 M^2
GLUCOSE SERPL-MCNC: 108 MG/DL
HCT VFR BLD AUTO: 35.5 %
HGB BLD-MCNC: 11.5 G/DL
LYMPHOCYTES # BLD AUTO: 0.9 K/UL
LYMPHOCYTES NFR BLD: 13.3 %
MCH RBC QN AUTO: 29 PG
MCHC RBC AUTO-ENTMCNC: 32.4 G/DL
MCV RBC AUTO: 90 FL
MONOCYTES # BLD AUTO: 0.6 K/UL
MONOCYTES NFR BLD: 8.1 %
NEUTROPHILS # BLD AUTO: 5.4 K/UL
NEUTROPHILS NFR BLD: 78.4 %
PLATELET # BLD AUTO: 256 K/UL
PMV BLD AUTO: 9.4 FL
POTASSIUM SERPL-SCNC: 3.8 MMOL/L
RBC # BLD AUTO: 3.96 M/UL
SODIUM SERPL-SCNC: 135 MMOL/L
WBC # BLD AUTO: 6.93 K/UL

## 2017-11-21 PROCEDURE — 11000001 HC ACUTE MED/SURG PRIVATE ROOM

## 2017-11-21 PROCEDURE — 85025 COMPLETE CBC W/AUTO DIFF WBC: CPT

## 2017-11-21 PROCEDURE — 94761 N-INVAS EAR/PLS OXIMETRY MLT: CPT

## 2017-11-21 PROCEDURE — 25000003 PHARM REV CODE 250: Performed by: STUDENT IN AN ORGANIZED HEALTH CARE EDUCATION/TRAINING PROGRAM

## 2017-11-21 PROCEDURE — 63600175 PHARM REV CODE 636 W HCPCS: Performed by: STUDENT IN AN ORGANIZED HEALTH CARE EDUCATION/TRAINING PROGRAM

## 2017-11-21 PROCEDURE — 80048 BASIC METABOLIC PNL TOTAL CA: CPT

## 2017-11-21 PROCEDURE — 36415 COLL VENOUS BLD VENIPUNCTURE: CPT

## 2017-11-21 RX ORDER — DONEPEZIL HYDROCHLORIDE 5 MG/1
10 TABLET, FILM COATED ORAL 2 TIMES DAILY
Status: DISCONTINUED | OUTPATIENT
Start: 2017-11-21 | End: 2017-12-04 | Stop reason: HOSPADM

## 2017-11-21 RX ORDER — LOSARTAN POTASSIUM 50 MG/1
50 TABLET ORAL DAILY
Status: DISCONTINUED | OUTPATIENT
Start: 2017-11-21 | End: 2017-11-24

## 2017-11-21 RX ORDER — MEMANTINE HYDROCHLORIDE 5 MG/1
10 TABLET ORAL 2 TIMES DAILY
Status: DISCONTINUED | OUTPATIENT
Start: 2017-11-21 | End: 2017-12-04 | Stop reason: HOSPADM

## 2017-11-21 RX ORDER — PANTOPRAZOLE SODIUM 40 MG/1
40 TABLET, DELAYED RELEASE ORAL DAILY
Status: DISCONTINUED | OUTPATIENT
Start: 2017-11-21 | End: 2017-11-27

## 2017-11-21 RX ORDER — METHOCARBAMOL 500 MG/1
500 TABLET, FILM COATED ORAL 4 TIMES DAILY
Status: DISCONTINUED | OUTPATIENT
Start: 2017-11-21 | End: 2017-12-04 | Stop reason: HOSPADM

## 2017-11-21 RX ORDER — ADHESIVE BANDAGE
30 BANDAGE TOPICAL ONCE
Status: COMPLETED | OUTPATIENT
Start: 2017-11-21 | End: 2017-11-21

## 2017-11-21 RX ORDER — ESCITALOPRAM OXALATE 20 MG/1
20 TABLET ORAL DAILY
Status: DISCONTINUED | OUTPATIENT
Start: 2017-11-21 | End: 2017-12-04 | Stop reason: HOSPADM

## 2017-11-21 RX ORDER — NAPROXEN SODIUM 220 MG/1
81 TABLET, FILM COATED ORAL DAILY
Status: DISCONTINUED | OUTPATIENT
Start: 2017-11-21 | End: 2017-12-04 | Stop reason: HOSPADM

## 2017-11-21 RX ADMIN — ACETAMINOPHEN 650 MG: 325 TABLET ORAL at 06:11

## 2017-11-21 RX ADMIN — DONEPEZIL HYDROCHLORIDE 10 MG: 5 TABLET, FILM COATED ORAL at 09:11

## 2017-11-21 RX ADMIN — DIPHENHYDRAMINE HYDROCHLORIDE 25 MG: 50 INJECTION, SOLUTION INTRAMUSCULAR; INTRAVENOUS at 11:11

## 2017-11-21 RX ADMIN — ONDANSETRON 8 MG: 8 TABLET, ORALLY DISINTEGRATING ORAL at 02:11

## 2017-11-21 RX ADMIN — SODIUM CHLORIDE, SODIUM LACTATE, POTASSIUM CHLORIDE, AND CALCIUM CHLORIDE: .6; .31; .03; .02 INJECTION, SOLUTION INTRAVENOUS at 06:11

## 2017-11-21 RX ADMIN — MEMANTINE HYDROCHLORIDE 10 MG: 5 TABLET ORAL at 09:11

## 2017-11-21 RX ADMIN — ENOXAPARIN SODIUM 40 MG: 100 INJECTION SUBCUTANEOUS at 09:11

## 2017-11-21 RX ADMIN — ACETAMINOPHEN 650 MG: 325 TABLET ORAL at 09:11

## 2017-11-21 RX ADMIN — METHOCARBAMOL 500 MG: 500 TABLET ORAL at 11:11

## 2017-11-21 RX ADMIN — ONDANSETRON 8 MG: 8 TABLET, ORALLY DISINTEGRATING ORAL at 06:11

## 2017-11-21 RX ADMIN — MAGNESIUM HYDROXIDE 2400 MG: 400 SUSPENSION ORAL at 07:11

## 2017-11-21 NOTE — PLAN OF CARE
Problem: Patient Care Overview  Goal: Plan of Care Review  Outcome: Ongoing (interventions implemented as appropriate)  Patient remains free from falls, bed alarm in use. Medicated for pain with tylenol, pain relief noted. Medicated for nausea and vomiting with Zofran ODT, good results noted. IV fluids in progress and placed on telemetry NSR.

## 2017-11-21 NOTE — PROGRESS NOTES
Notified Dr. Gardner patient was here and had no orders other than telemetry. Telemetry monitor placed and Meg notified of the name and box #. Dr. Gardner also notified patient is vomiting from the NG tube. He will be putting orders in. Will continue to monitor.

## 2017-11-21 NOTE — NURSING
Patient arrived by ambulance from [facility], awaiting patient to be put into the computer from [facility]. No orders as of yet. Report given by QUINTON Young. NG tube to right nare clamped off Patient complained that the tube was bothering her and she is having abdominal pain in her umbilical area and to her right and left lower quadrents, states it is a dull pain 6/10. Will call Dr Gardner for orders. Will continue to monitor.

## 2017-11-21 NOTE — PLAN OF CARE
Problem: Patient Care Overview  Goal: Plan of Care Review  Outcome: Ongoing (interventions implemented as appropriate)  AAox3. Bed remained low, locked and call bell within reach. Patient verbalized understanding to call for any needs or assistance. IVF discontinued per MD order. NG tube removed by patient. Physician ok'd to leave NG tube out. Abdominal xray performed. Diet advanced to clear liquid. Will continue to monitor patient

## 2017-11-21 NOTE — PROGRESS NOTES
NG tube pulled out with patient movement. Patient is not currently throwing up/gagging. Dr. Elena notified of NG tube removal. Ok to leave NG tube out for the moment. Awaiting xray of abdomen. Will continue to monitor patient

## 2017-11-21 NOTE — PROGRESS NOTES
partient request TN return, pt having abdominal discomfort.      Future Appointments  Date Time Provider Department Center   12/6/2017 4:00 PM Pedro Pablo Drake MD [facility]   2/7/2018 2:00 PM Miriam Saunders MD [facility] [place]

## 2017-11-21 NOTE — H&P
Patient ID: Lesa Holder is a 89 y.o. female.    Chief Complaint: No chief complaint on file.      HPI:  89F presented to [facility] with nausea and vomiting as well as crampy abdominal pain for 1-2 days. She has not had pain like this before. Some constipation recently. The pain is moderate and does not radiate. No fevers. No bloody stool or blood in vomit.         Review of Systems   Constitutional: Negative for fever and unexpected weight change.   HENT: Negative for trouble swallowing.    Respiratory: Negative for shortness of breath.    Cardiovascular: Negative for chest pain.   Gastrointestinal: Positive for abdominal pain, nausea and vomiting.   Genitourinary: Negative for dysuria.   Musculoskeletal: Positive for gait problem. Negative for back pain.   Skin: Negative for rash.   Neurological: Positive for weakness.   Hematological: Does not bruise/bleed easily.   Psychiatric/Behavioral: Negative for agitation.       Current Facility-Administered Medications   Medication Dose Route Frequency Provider Last Rate Last Dose    acetaminophen tablet 650 mg  650 mg Oral Q8H PRN Barney Gardner MD   650 mg at 11/21/17 0649    aspirin chewable tablet 81 mg  81 mg Oral Daily Barney Gardner MD        diphenhydrAMINE injection 25 mg  25 mg Intravenous Q4H PRN Barney Gardner MD        donepezil tablet 10 mg  10 mg Oral BID Barney Gardner MD        enoxaparin injection 40 mg  40 mg Subcutaneous Q24H Barney Gardner MD   40 mg at 11/20/17 2316    escitalopram oxalate tablet 20 mg  20 mg Oral Daily Barney Gardner MD        influenza (FLUZONE HIGH-DOSE) vaccine 0.5 mL  0.5 mL Intramuscular vaccine x 1 dose Barney Gardner MD        lactated ringers infusion   Intravenous Continuous Barney Gardner  mL/hr at 11/21/17 0643      losartan tablet 50 mg  50 mg Oral Daily Barney Gardner MD        memantine tablet 10 mg  10 mg Oral BID Barney Gardner MD        methocarbamol  tablet 500 mg  500 mg Oral QID Barney Gardner MD        ondansetron disintegrating tablet 8 mg  8 mg Oral Q8H PRN Barney Gardner MD   8 mg at 11/21/17 0643    pantoprazole EC tablet 40 mg  40 mg Oral Daily Barney Gardner MD        ramelteon tablet 8 mg  8 mg Oral Nightly PRN Barney Gardner MD        sodium chloride 0.9% flush 3 mL  3 mL Intravenous PRN Barney Gardner MD           Review of patient's allergies indicates:  No Known Allergies    Past Medical History:   Diagnosis Date    Cataract 1998    remove from left eye    Dementia     Depression     GERD (gastroesophageal reflux disease)     Hypertension        Past Surgical History:   Procedure Laterality Date    HYSTERECTOMY         Family History   Problem Relation Age of Onset    Cancer Daughter      lung    Heart disease Neg Hx        Social History     Social History    Marital status:      Spouse name: N/A    Number of children: N/A    Years of education: N/A     Occupational History    Not on file.     Social History Main Topics    Smoking status: Never Smoker    Smokeless tobacco: Never Used    Alcohol use No    Drug use: No    Sexual activity: Not Currently     Other Topics Concern    Not on file     Social History Narrative    Lives in [place] with her . 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 Attends Worship.        Vitals:    11/21/17 0525   BP: (!) 160/71   Pulse: 73   Resp: 18   Temp: 98.7 °F (37.1 °C)       Physical Exam   Constitutional: She appears well-developed and well-nourished. No distress.   Eyes: No scleral icterus.   Cardiovascular: Normal rate and regular rhythm.    Pulmonary/Chest: Effort normal. No respiratory distress.   Abdominal: Soft. There is no tenderness. There is no guarding.   Musculoskeletal: She exhibits no edema.   Lymphadenopathy:     She has no cervical adenopathy.   Neurological: She is alert.   Skin: No rash noted. She is not diaphoretic. No  erythema.   Psychiatric: She has a normal mood and affect.   Lower abdominal midline scar    BMP looks good  WBC normal  CT shows dilated proximal loops with possible transition point in pelvis    Assessment & Plan:   89F with pSBO  Will admit  NPO for now  NG tube  Check bmp

## 2017-11-22 LAB
ANION GAP SERPL CALC-SCNC: 13 MMOL/L
BUN SERPL-MCNC: 22 MG/DL
CALCIUM SERPL-MCNC: 9.3 MG/DL
CHLORIDE SERPL-SCNC: 96 MMOL/L
CO2 SERPL-SCNC: 25 MMOL/L
CREAT SERPL-MCNC: 0.8 MG/DL
EST. GFR  (AFRICAN AMERICAN): >60 ML/MIN/1.73 M^2
EST. GFR  (NON AFRICAN AMERICAN): >60 ML/MIN/1.73 M^2
GLUCOSE SERPL-MCNC: 110 MG/DL
POTASSIUM SERPL-SCNC: 3.4 MMOL/L
SODIUM SERPL-SCNC: 134 MMOL/L

## 2017-11-22 PROCEDURE — 94761 N-INVAS EAR/PLS OXIMETRY MLT: CPT

## 2017-11-22 PROCEDURE — 63600175 PHARM REV CODE 636 W HCPCS: Performed by: STUDENT IN AN ORGANIZED HEALTH CARE EDUCATION/TRAINING PROGRAM

## 2017-11-22 PROCEDURE — 11000001 HC ACUTE MED/SURG PRIVATE ROOM

## 2017-11-22 PROCEDURE — 36415 COLL VENOUS BLD VENIPUNCTURE: CPT

## 2017-11-22 PROCEDURE — 25000003 PHARM REV CODE 250: Performed by: STUDENT IN AN ORGANIZED HEALTH CARE EDUCATION/TRAINING PROGRAM

## 2017-11-22 PROCEDURE — 80048 BASIC METABOLIC PNL TOTAL CA: CPT

## 2017-11-22 PROCEDURE — 25500020 PHARM REV CODE 255: Performed by: STUDENT IN AN ORGANIZED HEALTH CARE EDUCATION/TRAINING PROGRAM

## 2017-11-22 RX ORDER — METOPROLOL TARTRATE 1 MG/ML
5 INJECTION, SOLUTION INTRAVENOUS EVERY 6 HOURS
Status: DISCONTINUED | OUTPATIENT
Start: 2017-11-22 | End: 2017-11-22

## 2017-11-22 RX ORDER — DEXTROSE, SODIUM CHLORIDE, SODIUM LACTATE, POTASSIUM CHLORIDE, AND CALCIUM CHLORIDE 5; .6; .31; .03; .02 G/100ML; G/100ML; G/100ML; G/100ML; G/100ML
INJECTION, SOLUTION INTRAVENOUS CONTINUOUS
Status: DISCONTINUED | OUTPATIENT
Start: 2017-11-22 | End: 2017-11-28

## 2017-11-22 RX ORDER — HYDROCODONE BITARTRATE AND ACETAMINOPHEN 5; 325 MG/1; MG/1
1 TABLET ORAL EVERY 6 HOURS PRN
Status: DISCONTINUED | OUTPATIENT
Start: 2017-11-22 | End: 2017-11-27

## 2017-11-22 RX ORDER — ERYTHROMYCIN 250 MG/1
250 TABLET, DELAYED RELEASE ORAL
Status: DISCONTINUED | OUTPATIENT
Start: 2017-11-22 | End: 2017-11-23

## 2017-11-22 RX ORDER — ERYTHROMYCIN 250 MG/1
250 TABLET, COATED ORAL
Status: DISCONTINUED | OUTPATIENT
Start: 2017-11-22 | End: 2017-11-22

## 2017-11-22 RX ORDER — POTASSIUM CHLORIDE 7.45 MG/ML
10 INJECTION INTRAVENOUS
Status: COMPLETED | OUTPATIENT
Start: 2017-11-22 | End: 2017-11-22

## 2017-11-22 RX ORDER — METOPROLOL TARTRATE 1 MG/ML
5 INJECTION, SOLUTION INTRAVENOUS EVERY 6 HOURS PRN
Status: DISCONTINUED | OUTPATIENT
Start: 2017-11-22 | End: 2017-11-24

## 2017-11-22 RX ORDER — SODIUM CHLORIDE, SODIUM LACTATE, POTASSIUM CHLORIDE, CALCIUM CHLORIDE 600; 310; 30; 20 MG/100ML; MG/100ML; MG/100ML; MG/100ML
INJECTION, SOLUTION INTRAVENOUS CONTINUOUS
Status: DISCONTINUED | OUTPATIENT
Start: 2017-11-22 | End: 2017-11-22

## 2017-11-22 RX ADMIN — POTASSIUM CHLORIDE 10 MEQ: 10 INJECTION, SOLUTION INTRAVENOUS at 07:11

## 2017-11-22 RX ADMIN — ONDANSETRON 8 MG: 8 TABLET, ORALLY DISINTEGRATING ORAL at 02:11

## 2017-11-22 RX ADMIN — METOPROLOL TARTRATE 5 MG: 5 INJECTION INTRAVENOUS at 09:11

## 2017-11-22 RX ADMIN — HYDROCODONE BITARTRATE AND ACETAMINOPHEN 1 TABLET: 5; 325 TABLET ORAL at 10:11

## 2017-11-22 RX ADMIN — POTASSIUM CHLORIDE 10 MEQ: 10 INJECTION, SOLUTION INTRAVENOUS at 03:11

## 2017-11-22 RX ADMIN — POTASSIUM CHLORIDE 10 MEQ: 10 INJECTION, SOLUTION INTRAVENOUS at 05:11

## 2017-11-22 RX ADMIN — ENOXAPARIN SODIUM 40 MG: 100 INJECTION SUBCUTANEOUS at 08:11

## 2017-11-22 RX ADMIN — DIATRIZOATE MEGLUMINE AND DIATRIZOATE SODIUM 100 ML: 660; 100 LIQUID ORAL; RECTAL at 03:11

## 2017-11-22 RX ADMIN — DEXTROSE, SODIUM CHLORIDE, SODIUM LACTATE, POTASSIUM CHLORIDE, AND CALCIUM CHLORIDE: 5; .6; .31; .03; .02 INJECTION, SOLUTION INTRAVENOUS at 08:11

## 2017-11-22 RX ADMIN — METHOCARBAMOL 500 MG: 500 TABLET ORAL at 06:11

## 2017-11-22 RX ADMIN — ONDANSETRON 8 MG: 8 TABLET, ORALLY DISINTEGRATING ORAL at 03:11

## 2017-11-22 RX ADMIN — SODIUM CHLORIDE, SODIUM LACTATE, POTASSIUM CHLORIDE, AND CALCIUM CHLORIDE: .6; .31; .03; .02 INJECTION, SOLUTION INTRAVENOUS at 09:11

## 2017-11-22 RX ADMIN — ACETAMINOPHEN 650 MG: 325 TABLET ORAL at 03:11

## 2017-11-22 RX ADMIN — POTASSIUM CHLORIDE 10 MEQ: 10 INJECTION, SOLUTION INTRAVENOUS at 06:11

## 2017-11-22 NOTE — PLAN OF CARE
Problem: Patient Care Overview  Goal: Plan of Care Review  Outcome: Ongoing (interventions implemented as appropriate)  Patient AAox3. Bed remained low, locked and call bell within reach. Patient verbalized understanding to call for any needs or assistance. NG tube replaced. Patient tolerated well. PRN blood pressure measured. Patient with no complaints of nausea and complaint of left sided pain. PRN pain medication ordered and administered. Will continue to monitor patient,

## 2017-11-22 NOTE — PLAN OF CARE
Tn met with pt, spouse, and granddaughter Zahira at bedside     dayo- [phone]   katerina salcido [phone]     11/21/17 1815   Discharge Assessment   Assessment Type Discharge Planning Assessment   Confirmed/corrected address and phone number on facesheet? Yes   Assessment information obtained from? Patient;Caregiver   Communicated expected length of stay with patient/caregiver yes   Prior to hospitilization cognitive status: Alert/Oriented   Prior to hospitalization functional status: Independent;Assistive Equipment   Current cognitive status: Alert/Oriented   Current Functional Status: Assistive Equipment;Independent   Lives With spouse   Able to Return to Prior Arrangements yes   Is patient able to care for self after discharge? Yes   Patient's perception of discharge disposition home or selfcare   Readmission Within The Last 30 Days no previous admission in last 30 days   Patient currently being followed by outpatient case management? No   Patient currently receives any other outside agency services? No   Equipment Currently Used at Home cane, straight   Do you have any problems affording any of your prescribed medications? No   Is the patient taking medications as prescribed? yes   Does the patient have transportation home? Yes   Transportation Available family or friend will provide   Does the patient receive services at the [facility]? No   Discharge Plan A Home with family;Home Health   Discharge Plan B Skilled Nursing Facility   Patient/Family In Agreement With Plan yes   Does the patient have transportation to healthcare appointments? Yes

## 2017-11-22 NOTE — PLAN OF CARE
Problem: Patient Care Overview  Goal: Plan of Care Review  Outcome: Ongoing (interventions implemented as appropriate)  Patient remains free from falls, bed alarm in use. Medicated for pain and pt voiced pain relief with medication given. On telemetry NSR 66-86. Medicated for nausea and relief voiced wit medications given.. Patient had 2 small soft, liquid BM's overnight.

## 2017-11-22 NOTE — PROGRESS NOTES
[facility]  General Surgery  Progress Note    Subjective:     Interval History:   Feeling better with NG tube, abdominal pain improved with insertion of NG tube  Had some nausea with clears, NG replaced  Small BM yesterday    Post-Op Info:  * No surgery found *          Medications:  Continuous Infusions:   lactated ringers 100 mL/hr at 11/22/17 0937     Scheduled Meds:   aspirin  81 mg Oral Daily    donepezil  10 mg Oral BID    enoxaparin  40 mg Subcutaneous Q24H    escitalopram oxalate  20 mg Oral Daily    losartan  50 mg Oral Daily    memantine  10 mg Oral BID    methocarbamol  500 mg Oral QID    pantoprazole  40 mg Oral Daily     PRN Meds:acetaminophen, diphenhydrAMINE, gastroview, hydrocodone-acetaminophen 5-325mg, influenza, metoprolol, ondansetron, ramelteon, sodium chloride 0.9%     Objective:     Vital Signs (Most Recent):  Temp: 97.6 °F (36.4 °C) (11/22/17 1233)  Pulse: 83 (11/22/17 1233)  Resp: 20 (11/22/17 1233)  BP: (!) 177/82 (11/22/17 1233)  SpO2: 96 % (11/22/17 1106) Vital Signs (24h Range):  Temp:  [97.6 °F (36.4 °C)-99.8 °F (37.7 °C)] 97.6 °F (36.4 °C)  Pulse:  [65-94] 83  Resp:  [16-22] 20  SpO2:  [95 %-98 %] 96 %  BP: (168-194)/(81-88) 177/82       Intake/Output Summary (Last 24 hours) at 11/22/17 1439  Last data filed at 11/22/17 0800   Gross per 24 hour   Intake             1020 ml   Output              200 ml   Net              820 ml       Physical Exam  Ab soft NDNT  NG in place  Alert      Significant Labs:  BMP:   Recent Labs  Lab 11/22/17  0944      *   K 3.4*   CL 96   CO2 25   BUN 22   CREATININE 0.8   CALCIUM 9.3     CBC:   Recent Labs  Lab 11/21/17  0507   WBC 6.93   RBC 3.96*   HGB 11.5*   HCT 35.5*      MCV 90   MCH 29.0   MCHC 32.4     ABX - NG tube in stomach    Significant Diagnostics:  I have reviewed all pertinent imaging results/findings within the past 24 hours.    Assessment/Plan:     Active Diagnoses:    Diagnosis Date Noted  POA    Bowel obstruction [K56.609] 11/20/2017 Yes      Problems Resolved During this Admission:    Diagnosis Date Noted Date Resolved POA     Replaced NG tube  NPO except meds  PO contrast, will eval with follow up XR  Replace K  Metoprolol prn htn        Barney Gardner MD  General Surgery  [facility]

## 2017-11-22 NOTE — PROGRESS NOTES
Notified Dr. Gardner patient has an elevated BP and her home medication is ordered daily. Orders given for Metoprolol 5 mg IV now and every 6 hours PRN SBP >160.

## 2017-11-23 LAB
ANION GAP SERPL CALC-SCNC: 10 MMOL/L
BUN SERPL-MCNC: 30 MG/DL
CALCIUM SERPL-MCNC: 9.2 MG/DL
CHLORIDE SERPL-SCNC: 98 MMOL/L
CO2 SERPL-SCNC: 27 MMOL/L
CREAT SERPL-MCNC: 0.9 MG/DL
EST. GFR  (AFRICAN AMERICAN): >60 ML/MIN/1.73 M^2
EST. GFR  (NON AFRICAN AMERICAN): 56 ML/MIN/1.73 M^2
GLUCOSE SERPL-MCNC: 160 MG/DL
POTASSIUM SERPL-SCNC: 3.8 MMOL/L
SODIUM SERPL-SCNC: 135 MMOL/L

## 2017-11-23 PROCEDURE — 11000001 HC ACUTE MED/SURG PRIVATE ROOM

## 2017-11-23 PROCEDURE — 94761 N-INVAS EAR/PLS OXIMETRY MLT: CPT

## 2017-11-23 PROCEDURE — 25000003 PHARM REV CODE 250: Performed by: STUDENT IN AN ORGANIZED HEALTH CARE EDUCATION/TRAINING PROGRAM

## 2017-11-23 PROCEDURE — 80048 BASIC METABOLIC PNL TOTAL CA: CPT

## 2017-11-23 PROCEDURE — 63600175 PHARM REV CODE 636 W HCPCS: Performed by: STUDENT IN AN ORGANIZED HEALTH CARE EDUCATION/TRAINING PROGRAM

## 2017-11-23 PROCEDURE — 36415 COLL VENOUS BLD VENIPUNCTURE: CPT

## 2017-11-23 RX ORDER — POTASSIUM CHLORIDE 7.45 MG/ML
10 INJECTION INTRAVENOUS
Status: COMPLETED | OUTPATIENT
Start: 2017-11-23 | End: 2017-11-23

## 2017-11-23 RX ADMIN — POTASSIUM CHLORIDE 10 MEQ: 10 INJECTION, SOLUTION INTRAVENOUS at 04:11

## 2017-11-23 RX ADMIN — RAMELTEON 8 MG: 8 TABLET, FILM COATED ORAL at 07:11

## 2017-11-23 RX ADMIN — ASPIRIN 81 MG 81 MG: 81 TABLET ORAL at 09:11

## 2017-11-23 RX ADMIN — ESCITALOPRAM OXALATE 20 MG: 20 TABLET, FILM COATED ORAL at 09:11

## 2017-11-23 RX ADMIN — ENOXAPARIN SODIUM 40 MG: 100 INJECTION SUBCUTANEOUS at 08:11

## 2017-11-23 RX ADMIN — MEMANTINE HYDROCHLORIDE 10 MG: 5 TABLET ORAL at 08:11

## 2017-11-23 RX ADMIN — DONEPEZIL HYDROCHLORIDE 10 MG: 5 TABLET, FILM COATED ORAL at 09:11

## 2017-11-23 RX ADMIN — DONEPEZIL HYDROCHLORIDE 10 MG: 5 TABLET, FILM COATED ORAL at 08:11

## 2017-11-23 RX ADMIN — POTASSIUM CHLORIDE 10 MEQ: 10 INJECTION, SOLUTION INTRAVENOUS at 05:11

## 2017-11-23 RX ADMIN — POTASSIUM CHLORIDE 10 MEQ: 10 INJECTION, SOLUTION INTRAVENOUS at 06:11

## 2017-11-23 RX ADMIN — POTASSIUM CHLORIDE 10 MEQ: 10 INJECTION, SOLUTION INTRAVENOUS at 03:11

## 2017-11-23 RX ADMIN — HYDROCODONE BITARTRATE AND ACETAMINOPHEN 1 TABLET: 5; 325 TABLET ORAL at 07:11

## 2017-11-23 RX ADMIN — METOPROLOL TARTRATE 5 MG: 5 INJECTION INTRAVENOUS at 05:11

## 2017-11-23 RX ADMIN — LOSARTAN POTASSIUM 50 MG: 50 TABLET, FILM COATED ORAL at 09:11

## 2017-11-23 RX ADMIN — MEMANTINE HYDROCHLORIDE 10 MG: 5 TABLET ORAL at 09:11

## 2017-11-23 RX ADMIN — PANTOPRAZOLE SODIUM 40 MG: 40 TABLET, DELAYED RELEASE ORAL at 09:11

## 2017-11-23 NOTE — PROGRESS NOTES
Repeat x-ray completed at 1945. Results pending. Jj LANE notified. Per MD verbal, NGT to be left clamped. Patient safety maintained. Monitoring on-going.     0600: Pt refused PO medications and AM lab draw this AM. X-ray completed without difficulty. Lab to attempt to obtain labs again. Patient safety maintained.

## 2017-11-23 NOTE — PLAN OF CARE
Problem: Patient Care Overview  Goal: Plan of Care Review  Outcome: Ongoing (interventions implemented as appropriate)  AAOx3. Bed remained low, locked and call bell within reach. Patient verbalize understanding to call for any needs or assistance. NG tube reinserted per MD order. Patient tolerated PO medications. No complaints of pain. Will continue to monitor patient.

## 2017-11-23 NOTE — PROGRESS NOTES
[facility]  General Surgery  Progress Note    Subjective:     Interval History:   Pulled her NG tube out again last night  Reports some nausea this morning, gagging. No vomiting  Reports that she is passing some flatus, no BM yesterday    Post-Op Info:  * No surgery found *          Medications:  Continuous Infusions:   dextrose 5% lactated ringers 100 mL/hr at 11/22/17 2003     Scheduled Meds:   aspirin  81 mg Oral Daily    donepezil  10 mg Oral BID    enoxaparin  40 mg Subcutaneous Q24H    erythromycin  250 mg Oral QID (WM & HS)    escitalopram oxalate  20 mg Oral Daily    losartan  50 mg Oral Daily    memantine  10 mg Oral BID    methocarbamol  500 mg Oral QID    pantoprazole  40 mg Oral Daily     PRN Meds:acetaminophen, diphenhydrAMINE, hydrocodone-acetaminophen 5-325mg, influenza, metoprolol, ondansetron, ramelteon, sodium chloride 0.9%     Objective:     Vital Signs (Most Recent):  Temp: 98.1 °F (36.7 °C) (11/23/17 0749)  Pulse: 94 (11/23/17 0749)  Resp: 14 (11/23/17 0749)  BP: (!) 151/81 (11/23/17 0749)  SpO2: 95 % (11/23/17 0546) Vital Signs (24h Range):  Temp:  [97.6 °F (36.4 °C)-98.8 °F (37.1 °C)] 98.1 °F (36.7 °C)  Pulse:  [] 94  Resp:  [14-20] 14  SpO2:  [95 %-96 %] 95 %  BP: (151-177)/(81-94) 151/81       Intake/Output Summary (Last 24 hours) at 11/23/17 1104  Last data filed at 11/23/17 0500   Gross per 24 hour   Intake              895 ml   Output              400 ml   Net              495 ml       Physical Exam   Alert, NAD  Ab soft, nontender but more distended      Significant Labs:  BMP:   Recent Labs  Lab 11/23/17  0626   *   *   K 3.8   CL 98   CO2 27   BUN 30*   CREATININE 0.9   CALCIUM 9.2     CBC: No results for input(s): WBC, RBC, HGB, HCT, PLT, MCV, MCH, MCHC in the last 48 hours.    Significant Diagnostics:  I have reviewed all pertinent imaging results/findings within the past 24 hours.    Assessment/Plan:     Active Diagnoses:    Diagnosis  Date Noted POA    PRINCIPAL PROBLEM:  Bowel obstruction [K56.609] 11/20/2017 Yes      Problems Resolved During this Admission:    Diagnosis Date Noted Date Resolved POA     Had discussion with patient regarding NG tube. She agrees that she will leave it in place if replaced  XRay reviewed - concerning that there is still no contrast in colon this morning. Will re place NG tube  Replace K  Continue NPO, will consider TPN. May need ex lap soon.     Barney Gardner MD  General Surgery  [facility]

## 2017-11-24 ENCOUNTER — ANESTHESIA EVENT (OUTPATIENT)
Dept: SURGERY | Facility: HOSPITAL | Age: 82
DRG: 329 | End: 2017-11-24
Payer: MEDICARE

## 2017-11-24 ENCOUNTER — ANESTHESIA (OUTPATIENT)
Dept: SURGERY | Facility: HOSPITAL | Age: 82
DRG: 329 | End: 2017-11-24
Payer: MEDICARE

## 2017-11-24 PROBLEM — R79.89 ELEVATED TROPONIN: Status: ACTIVE | Noted: 2017-11-24

## 2017-11-24 LAB
ANION GAP SERPL CALC-SCNC: 8 MMOL/L
BUN SERPL-MCNC: 26 MG/DL
CALCIUM SERPL-MCNC: 8.9 MG/DL
CHLORIDE SERPL-SCNC: 98 MMOL/L
CO2 SERPL-SCNC: 28 MMOL/L
CREAT SERPL-MCNC: 0.8 MG/DL
EST. GFR  (AFRICAN AMERICAN): >60 ML/MIN/1.73 M^2
EST. GFR  (NON AFRICAN AMERICAN): >60 ML/MIN/1.73 M^2
GLUCOSE SERPL-MCNC: 158 MG/DL
MAGNESIUM SERPL-MCNC: 2.1 MG/DL
PHOSPHATE SERPL-MCNC: 2.3 MG/DL
POTASSIUM SERPL-SCNC: 4 MMOL/L
SODIUM SERPL-SCNC: 134 MMOL/L
TROPONIN I SERPL DL<=0.01 NG/ML-MCNC: 0.04 NG/ML
TROPONIN I SERPL DL<=0.01 NG/ML-MCNC: 0.05 NG/ML
TROPONIN I SERPL DL<=0.01 NG/ML-MCNC: 0.06 NG/ML

## 2017-11-24 PROCEDURE — 11000001 HC ACUTE MED/SURG PRIVATE ROOM

## 2017-11-24 PROCEDURE — 25000003 PHARM REV CODE 250: Performed by: STUDENT IN AN ORGANIZED HEALTH CARE EDUCATION/TRAINING PROGRAM

## 2017-11-24 PROCEDURE — 84100 ASSAY OF PHOSPHORUS: CPT

## 2017-11-24 PROCEDURE — 25000003 PHARM REV CODE 250: Performed by: PHYSICIAN ASSISTANT

## 2017-11-24 PROCEDURE — 36415 COLL VENOUS BLD VENIPUNCTURE: CPT

## 2017-11-24 PROCEDURE — 80048 BASIC METABOLIC PNL TOTAL CA: CPT

## 2017-11-24 PROCEDURE — 93010 ELECTROCARDIOGRAM REPORT: CPT | Mod: ,,, | Performed by: INTERNAL MEDICINE

## 2017-11-24 PROCEDURE — 84484 ASSAY OF TROPONIN QUANT: CPT | Mod: 91

## 2017-11-24 PROCEDURE — 84484 ASSAY OF TROPONIN QUANT: CPT

## 2017-11-24 PROCEDURE — B4185 PARENTERAL SOL 10 GM LIPIDS: HCPCS | Performed by: STUDENT IN AN ORGANIZED HEALTH CARE EDUCATION/TRAINING PROGRAM

## 2017-11-24 PROCEDURE — 94761 N-INVAS EAR/PLS OXIMETRY MLT: CPT

## 2017-11-24 PROCEDURE — 63600175 PHARM REV CODE 636 W HCPCS: Performed by: STUDENT IN AN ORGANIZED HEALTH CARE EDUCATION/TRAINING PROGRAM

## 2017-11-24 PROCEDURE — 83735 ASSAY OF MAGNESIUM: CPT

## 2017-11-24 PROCEDURE — 99233 SBSQ HOSP IP/OBS HIGH 50: CPT | Mod: ,,, | Performed by: STUDENT IN AN ORGANIZED HEALTH CARE EDUCATION/TRAINING PROGRAM

## 2017-11-24 PROCEDURE — 93005 ELECTROCARDIOGRAM TRACING: CPT

## 2017-11-24 RX ORDER — HYDRALAZINE HYDROCHLORIDE 20 MG/ML
10 INJECTION INTRAMUSCULAR; INTRAVENOUS EVERY 8 HOURS PRN
Status: DISCONTINUED | OUTPATIENT
Start: 2017-11-24 | End: 2017-12-04 | Stop reason: HOSPADM

## 2017-11-24 RX ORDER — HYDRALAZINE HYDROCHLORIDE 20 MG/ML
10 INJECTION INTRAMUSCULAR; INTRAVENOUS ONCE
Status: COMPLETED | OUTPATIENT
Start: 2017-11-24 | End: 2017-11-24

## 2017-11-24 RX ORDER — ENALAPRILAT 1.25 MG/ML
1.25 INJECTION INTRAVENOUS EVERY 6 HOURS
Status: DISCONTINUED | OUTPATIENT
Start: 2017-11-24 | End: 2017-11-26

## 2017-11-24 RX ADMIN — ENOXAPARIN SODIUM 40 MG: 100 INJECTION SUBCUTANEOUS at 08:11

## 2017-11-24 RX ADMIN — METHOCARBAMOL 500 MG: 500 TABLET ORAL at 11:11

## 2017-11-24 RX ADMIN — DEXTROSE, SODIUM CHLORIDE, SODIUM LACTATE, POTASSIUM CHLORIDE, AND CALCIUM CHLORIDE: 5; .6; .31; .03; .02 INJECTION, SOLUTION INTRAVENOUS at 04:11

## 2017-11-24 RX ADMIN — ASPIRIN 81 MG 81 MG: 81 TABLET ORAL at 08:11

## 2017-11-24 RX ADMIN — ENALAPRILAT 1.25 MG: 1.25 INJECTION, SOLUTION INTRAVENOUS at 11:11

## 2017-11-24 RX ADMIN — HYDROCODONE BITARTRATE AND ACETAMINOPHEN 1 TABLET: 5; 325 TABLET ORAL at 07:11

## 2017-11-24 RX ADMIN — LOSARTAN POTASSIUM 50 MG: 50 TABLET, FILM COATED ORAL at 08:11

## 2017-11-24 RX ADMIN — METOPROLOL TARTRATE 5 MG: 5 INJECTION INTRAVENOUS at 07:11

## 2017-11-24 RX ADMIN — MEMANTINE HYDROCHLORIDE 10 MG: 5 TABLET ORAL at 08:11

## 2017-11-24 RX ADMIN — DONEPEZIL HYDROCHLORIDE 10 MG: 5 TABLET, FILM COATED ORAL at 08:11

## 2017-11-24 RX ADMIN — ESCITALOPRAM OXALATE 20 MG: 20 TABLET, FILM COATED ORAL at 08:11

## 2017-11-24 RX ADMIN — ONDANSETRON 8 MG: 8 TABLET, ORALLY DISINTEGRATING ORAL at 08:11

## 2017-11-24 RX ADMIN — ASCORBIC ACID, VITAMIN A PALMITATE, CHOLECALCIFEROL, THIAMINE HYDROCHLORIDE, RIBOFLAVIN-5 PHOSPHATE SODIUM, PYRIDOXINE HYDROCHLORIDE, NIACINAMIDE, DEXPANTHENOL, ALPHA-TOCOPHEROL ACETATE, VITAMIN K1, FOLIC ACID, BIOTIN, CYANOCOBALAMIN: 200; 3300; 200; 6; 3.6; 6; 40; 15; 10; 150; 600; 60; 5 INJECTION, SOLUTION INTRAVENOUS at 03:11

## 2017-11-24 RX ADMIN — ENALAPRILAT 1.25 MG: 1.25 INJECTION, SOLUTION INTRAVENOUS at 05:11

## 2017-11-24 RX ADMIN — SOYBEAN OIL 250 ML: 20 INJECTION, SOLUTION INTRAVENOUS at 09:11

## 2017-11-24 RX ADMIN — METHOCARBAMOL 500 MG: 500 TABLET ORAL at 05:11

## 2017-11-24 RX ADMIN — DEXTROSE, SODIUM CHLORIDE, SODIUM LACTATE, POTASSIUM CHLORIDE, AND CALCIUM CHLORIDE: 5; .6; .31; .03; .02 INJECTION, SOLUTION INTRAVENOUS at 02:11

## 2017-11-24 RX ADMIN — PANTOPRAZOLE SODIUM 40 MG: 40 TABLET, DELAYED RELEASE ORAL at 08:11

## 2017-11-24 RX ADMIN — HYDRALAZINE HYDROCHLORIDE 10 MG: 20 INJECTION INTRAMUSCULAR; INTRAVENOUS at 08:11

## 2017-11-24 NOTE — PROGRESS NOTES
Had discussion about patients condition with her and her . She has signs and symptoms as well as radiologic evidence of a bowel obstruction. I discussed that his could be due to adhesions. We have tried conservative management with NG suction and PO contrast. She does not appear to be resolving. I recommended surgery to the  who at this point is refusing to allow her to go to the operating room. I told him she could get worse necessitating an emergency surgery with increased risks. He still does not want her to have surgery at this time. Will continue conservative management for now. Start TPN, continue NG tube. A few more days of conservative management will be tired but as I told him we will reassess and it looks unlikely at this point that her obstruction will resolve without surgery.

## 2017-11-24 NOTE — CONSULTS
[facility] Hospital Medicine  Consult Note    Patient Name: Lesa Holder  MRN: [id number]  Admission Date: 2017  Hospital Length of Stay: 4 days  Attending Physician: Barney Casillas MD   [facility]           Patient information was obtained from patient, spouse/SO, past medical records and ER records.     Inpatient consult to [facility]  Consult performed by: ED WISE  Consult ordered by: BARNEY CASILLAS  Reason for consult: changed BP medication to IV - will follow         Subjective:     Principal Problem: Bowel obstruction    Chief Complaint: No chief complaint on file.       HPI: 89 female with hypertension and mild cognitive impairment admitted to General Surgery for N/V with SBO.  Hospital Medicine consulted for elevated BP and troponin.      Pt seen in her room with her , Ty, present. He states that her SBP usually runs 130s-150s.  Pt denies current chest pain or abdominal pain. Denies h/o heart attack or heart surgery.  Denies use of alcohol, tobacco or illicit drugs.     Pt's mother  in her 70s of unknown cause; father  in MVA.  Pt had 1 brother who  of unknown cause.  Pt had 4 children, 1 is still living.  Pt and  live together by themselves.     Past Medical History:   Diagnosis Date    Cataract     remove from left eye    Dementia     Depression     GERD (gastroesophageal reflux disease)     Hypertension        Past Surgical History:   Procedure Laterality Date    HYSTERECTOMY         Review of patient's allergies indicates:  No Known Allergies    No current facility-administered medications on file prior to encounter.      Current Outpatient Prescriptions on File Prior to Encounter   Medication Sig    aspirin 81 MG Chew Take 1 tablet (81 mg total) by mouth once daily.    benzonatate (TESSALON) 100 MG capsule Take 200 mg by mouth 2 (two) times daily as needed for Cough.      donepezil (ARICEPT) 10 MG tablet Take 1 tablet (10 mg total) by mouth 2 (two) times daily.    escitalopram oxalate (LEXAPRO) 20 MG tablet Take 1 tablet (20 mg total) by mouth once daily.    esomeprazole (NEXIUM) 40 MG capsule Take 1 capsule by mouth Daily.    losartan (COZAAR) 50 MG tablet Take 1 tablet (50 mg total) by mouth once daily.    memantine (NAMENDA) 10 MG Tab Take 1 tablet (10 mg total) by mouth 2 (two) times daily.    methocarbamol (ROBAXIN) 500 MG Tab Take 500 mg by mouth 4 (four) times daily.    multivitamin capsule Take 1 capsule by mouth once daily.    oxycodone-acetaminophen (PERCOCET) 5-325 mg per tablet Take 1 tablet by mouth every 4 (four) hours as needed for Pain.    sucralfate (CARAFATE) 100 mg/mL suspension Take 10 mLs (1 g total) by mouth every evening.     Family History     Problem Relation (Age of Onset)    Cancer Daughter        Social History Main Topics    Smoking status: Never Smoker    Smokeless tobacco: Never Used    Alcohol use No    Drug use: No    Sexual activity: Not Currently     Review of Systems   Constitutional: Negative for chills, fatigue and fever.   HENT: Negative for congestion, postnasal drip, sneezing and sore throat.    Eyes: Negative for discharge, redness and itching.   Respiratory: Negative for cough, shortness of breath and wheezing.    Cardiovascular: Negative for chest pain, palpitations and leg swelling.   Gastrointestinal: Positive for abdominal pain, nausea and vomiting. Negative for abdominal distention, constipation and diarrhea.   Endocrine: Negative for polydipsia, polyphagia and polyuria.   Genitourinary: Negative for difficulty urinating, dysuria, flank pain, frequency, hematuria and urgency.   Musculoskeletal: Negative for arthralgias, joint swelling and myalgias.   Skin: Negative for rash and wound.   Neurological: Negative for dizziness, syncope, weakness, light-headedness and headaches.   Psychiatric/Behavioral: Negative for  agitation, confusion and sleep disturbance. The patient is not nervous/anxious.      Objective:     Vital Signs (Most Recent):  Temp: 98 °F (36.7 °C) (11/24/17 0741)  Pulse: 81 (11/24/17 0741)  Resp: 16 (11/24/17 0741)  BP: (!) 170/78 (11/24/17 0920)  SpO2: 95 % (11/24/17 0857) Vital Signs (24h Range):  Temp:  [98 °F (36.7 °C)-98.9 °F (37.2 °C)] 98 °F (36.7 °C)  Pulse:  [73-89] 81  Resp:  [14-18] 16  SpO2:  [94 %-96 %] 95 %  BP: (151-200)/(62-99) 170/78     Weight: 76.9 kg (169 lb 8.5 oz)  Body mass index is 30.03 kg/m².    Physical Exam   Constitutional: She is oriented to person, place, and time. She appears well-developed and well-nourished. No distress.   HENT:   Head: Normocephalic and atraumatic.   Mouth/Throat: No oropharyngeal exudate.   NG tube in right nares.    Eyes: EOM are normal. Pupils are equal, round, and reactive to light. No scleral icterus.   Neck: Normal range of motion. Neck supple. No thyromegaly present.   Cardiovascular: Normal rate and regular rhythm.    No murmur heard.  Pulmonary/Chest: Effort normal and breath sounds normal. No respiratory distress. She has no wheezes. She exhibits no tenderness.   Abdominal: She exhibits no distension. There is no tenderness. There is no rebound and no guarding.   Hypoactive bowel sounds    Neurological: She is alert and oriented to person, place, and time.   Skin: Skin is warm and dry. No rash noted. She is not diaphoretic.   Psychiatric: She has a normal mood and affect. Thought content normal.   Nursing note and vitals reviewed.      Significant Labs:   BMP:   Recent Labs  Lab 11/24/17  0441   *   *   K 4.0   CL 98   CO2 28   BUN 26*   CREATININE 0.8   CALCIUM 8.9   MG 2.1     Troponin:   Recent Labs  Lab 11/24/17  0824   TROPONINI 0.057*       Significant Imaging: none today    Assessment/Plan:     * Bowel obstruction    As per General Surgery.           Essential hypertension    -200 for past 24 hours.  Pt on losartan 50 mg at  home which is resumed here but perhaps ineffective 2/2 SBO.   reports -150s at home usually.  Starting IV enalaprit 1.25 mg every 6 hours and PRN hydralazine 10 mg every 8 hours. Monitor.           Elevated troponin    Likely 2/2 demand ischemia from elevated BP.  Pt denies CP currently.  EKG shows no ST elevation.  Trend troponin. Monitor on telemetry.  2D Echo if stays elevated.           Dementia without behavioral disturbance    Pt alert and oriented.  On namenda and aricept at home which are continued here.              VTE Risk Mitigation         Ordered     enoxaparin injection 40 mg  Every 24 hours (non-standard times)     Route:  Subcutaneous        11/20/17 1956     Medium Risk of VTE  Once      11/20/17 1956     Place sequential compression device  Until discontinued      11/20/17 1956              Thank you for your consult. I will follow-up with patient. Please contact us if you have any additional questions.    Livier York PA-C  Department of Hospital Medicine   [facility]

## 2017-11-24 NOTE — PROCEDURES
"Lesa Holder is a 89 y.o. female patient.    Temp: 98 °F (36.7 °C) (11/24/17 0741)  Pulse: 81 (11/24/17 0741)  Resp: 16 (11/24/17 0741)  BP: (!) 170/78 (11/24/17 0920)  SpO2: 95 % (11/24/17 0857)  Weight: 76.9 kg (169 lb 8.5 oz) (11/20/17 2112)  Height: 5' 3" (160 cm) (11/20/17 2112)    PICC  Date/Time: 11/24/2017 12:21 PM  Performed by: ELIESER HOWELL  Consent Done: Yes  Time out: Immediately prior to procedure a time out was called to verify the correct patient, procedure, equipment, support staff and site/side marked as required  Indications: med administration and vascular access  Anesthesia: local infiltration  Local anesthetic: lidocaine 1% without epinephrine  Anesthetic Total (mL): 5  Description of findings: picc  Preparation: skin prepped with chlorhexidine (without alcohol)  Skin prep agent dried: skin prep agent completely dried prior to procedure  Sterile barriers: all five maximum sterile barriers used - cap, mask, sterile gown, sterile gloves, and large sterile sheet  Hand hygiene: hand hygiene performed prior to central venous catheter insertion  Location details: right basilic  Catheter type: double lumen  Catheter size: 5 Fr  Catheter Length: 36cm    Ultrasound guidance: yes  Vessel Caliber: medium and patent, compressibility normal  Vascular Doppler: not done  Needle advanced into vessel with real time Ultrasound guidance.  Guidewire confirmed in vessel.  Sterile sheath used.  no esophageal manometryNumber of attempts: 1  Post-procedure: blood return through all ports, sterile dressing applied and chlorhexidine patch  Estimated blood loss (mL): 0  Specimens: No  Implants: No  Assessment: placement verified by x-ray, tip termination and successful placement  Complications: none        Elieser Howell  11/24/2017  "

## 2017-11-24 NOTE — PROGRESS NOTES
[facility]  General Surgery  Progress Note    Subjective:     Interval History:   NG tube replaced  PO contrast did not seem to pass transition point  NG with minimal output since replacement  Now complains of mild left chest/LUQ discomfort    Post-Op Info:  Procedure(s) (LRB):  LAPAROTOMY-EXPLORATORY (N/A)          Medications:  Continuous Infusions:   dextrose 5% lactated ringers 100 mL/hr at 11/24/17 0433     Scheduled Meds:   aspirin  81 mg Oral Daily    donepezil  10 mg Oral BID    enoxaparin  40 mg Subcutaneous Q24H    escitalopram oxalate  20 mg Oral Daily    losartan  50 mg Oral Daily    memantine  10 mg Oral BID    methocarbamol  500 mg Oral QID    pantoprazole  40 mg Oral Daily     PRN Meds:acetaminophen, diphenhydrAMINE, hydrocodone-acetaminophen 5-325mg, influenza, metoprolol, ondansetron, ramelteon, sodium chloride 0.9%     Objective:     Vital Signs (Most Recent):  Temp: 98 °F (36.7 °C) (11/24/17 0741)  Pulse: 81 (11/24/17 0741)  Resp: 16 (11/24/17 0741)  BP: (!) 200/94 (11/24/17 0741)  SpO2: (!) 94 % (11/24/17 0741) Vital Signs (24h Range):  Temp:  [98 °F (36.7 °C)-98.9 °F (37.2 °C)] 98 °F (36.7 °C)  Pulse:  [73-89] 81  Resp:  [14-18] 16  SpO2:  [94 %-96 %] 94 %  BP: (151-200)/(62-99) 200/94       Intake/Output Summary (Last 24 hours) at 11/24/17 0816  Last data filed at 11/24/17 0600   Gross per 24 hour   Intake             2700 ml   Output              300 ml   Net             2400 ml       Physical Exam   Alert, NAD  Ab soft, nontender, some distention      Significant Labs:  BMP:     Recent Labs  Lab 11/24/17  0441   *   *   K 4.0   CL 98   CO2 28   BUN 26*   CREATININE 0.8   CALCIUM 8.9   MG 2.1     CBC: No results for input(s): WBC, RBC, HGB, HCT, PLT, MCV, MCH, MCHC in the last 48 hours.    Significant Diagnostics:  I have reviewed all pertinent imaging results/findings within the past 24 hours.    Assessment/Plan:     Active Diagnoses:    Diagnosis Date  Noted POA    PRINCIPAL PROBLEM:  Bowel obstruction [K56.609] 11/20/2017 Yes      Problems Resolved During this Admission:    Diagnosis Date Noted Date Resolved POA     She left NG tube in place, not impressive amount of output  Follow up XRs concerning for persistent bowel obstruction  WIll get stat EKG, troponins this morning  Plan for ex lap today       Barney Gardner MD  General Surgery  [facility]

## 2017-11-24 NOTE — ASSESSMENT & PLAN NOTE
Likely 2/2 demand ischemia from elevated BP.  Pt denies CP currently.  EKG shows no ST elevation.  Trend troponin. Monitor on telemetry.  2D Echo if stays elevated.

## 2017-11-24 NOTE — ASSESSMENT & PLAN NOTE
-200 for past 24 hours.  Pt on losartan 50 mg at home which is resumed here but perhaps ineffective 2/2 SBO.   reports -150s at home usually.  Starting IV enalaprit 1.25 mg every 6 hours and PRN hydralazine 10 mg every 8 hours. Monitor.

## 2017-11-24 NOTE — SUBJECTIVE & OBJECTIVE
Past Medical History:   Diagnosis Date    Cataract 1998    remove from left eye    Dementia     Depression     GERD (gastroesophageal reflux disease)     Hypertension        Past Surgical History:   Procedure Laterality Date    HYSTERECTOMY         Review of patient's allergies indicates:  No Known Allergies    No current facility-administered medications on file prior to encounter.      Current Outpatient Prescriptions on File Prior to Encounter   Medication Sig    aspirin 81 MG Chew Take 1 tablet (81 mg total) by mouth once daily.    benzonatate (TESSALON) 100 MG capsule Take 200 mg by mouth 2 (two) times daily as needed for Cough.     donepezil (ARICEPT) 10 MG tablet Take 1 tablet (10 mg total) by mouth 2 (two) times daily.    escitalopram oxalate (LEXAPRO) 20 MG tablet Take 1 tablet (20 mg total) by mouth once daily.    esomeprazole (NEXIUM) 40 MG capsule Take 1 capsule by mouth Daily.    losartan (COZAAR) 50 MG tablet Take 1 tablet (50 mg total) by mouth once daily.    memantine (NAMENDA) 10 MG Tab Take 1 tablet (10 mg total) by mouth 2 (two) times daily.    methocarbamol (ROBAXIN) 500 MG Tab Take 500 mg by mouth 4 (four) times daily.    multivitamin capsule Take 1 capsule by mouth once daily.    oxycodone-acetaminophen (PERCOCET) 5-325 mg per tablet Take 1 tablet by mouth every 4 (four) hours as needed for Pain.    sucralfate (CARAFATE) 100 mg/mL suspension Take 10 mLs (1 g total) by mouth every evening.     Family History     Problem Relation (Age of Onset)    Cancer Daughter        Social History Main Topics    Smoking status: Never Smoker    Smokeless tobacco: Never Used    Alcohol use No    Drug use: No    Sexual activity: Not Currently     Review of Systems   Constitutional: Negative for chills, fatigue and fever.   HENT: Negative for congestion, postnasal drip, sneezing and sore throat.    Eyes: Negative for discharge, redness and itching.   Respiratory: Negative for cough,  shortness of breath and wheezing.    Cardiovascular: Negative for chest pain, palpitations and leg swelling.   Gastrointestinal: Positive for abdominal pain, nausea and vomiting. Negative for abdominal distention, constipation and diarrhea.   Endocrine: Negative for polydipsia, polyphagia and polyuria.   Genitourinary: Negative for difficulty urinating, dysuria, flank pain, frequency, hematuria and urgency.   Musculoskeletal: Negative for arthralgias, joint swelling and myalgias.   Skin: Negative for rash and wound.   Neurological: Negative for dizziness, syncope, weakness, light-headedness and headaches.   Psychiatric/Behavioral: Negative for agitation, confusion and sleep disturbance. The patient is not nervous/anxious.      Objective:     Vital Signs (Most Recent):  Temp: 98 °F (36.7 °C) (11/24/17 0741)  Pulse: 81 (11/24/17 0741)  Resp: 16 (11/24/17 0741)  BP: (!) 170/78 (11/24/17 0920)  SpO2: 95 % (11/24/17 0857) Vital Signs (24h Range):  Temp:  [98 °F (36.7 °C)-98.9 °F (37.2 °C)] 98 °F (36.7 °C)  Pulse:  [73-89] 81  Resp:  [14-18] 16  SpO2:  [94 %-96 %] 95 %  BP: (151-200)/(62-99) 170/78     Weight: 76.9 kg (169 lb 8.5 oz)  Body mass index is 30.03 kg/m².    Physical Exam   Constitutional: She is oriented to person, place, and time. She appears well-developed and well-nourished. No distress.   HENT:   Head: Normocephalic and atraumatic.   Mouth/Throat: No oropharyngeal exudate.   NG tube in right nares.    Eyes: EOM are normal. Pupils are equal, round, and reactive to light. No scleral icterus.   Neck: Normal range of motion. Neck supple. No thyromegaly present.   Cardiovascular: Normal rate and regular rhythm.    No murmur heard.  Pulmonary/Chest: Effort normal and breath sounds normal. No respiratory distress. She has no wheezes. She exhibits no tenderness.   Abdominal: She exhibits no distension. There is no tenderness. There is no rebound and no guarding.   Hypoactive bowel sounds    Neurological: She is  alert and oriented to person, place, and time.   Skin: Skin is warm and dry. No rash noted. She is not diaphoretic.   Psychiatric: She has a normal mood and affect. Thought content normal.   Nursing note and vitals reviewed.      Significant Labs:   BMP:   Recent Labs  Lab 11/24/17  0441   *   *   K 4.0   CL 98   CO2 28   BUN 26*   CREATININE 0.8   CALCIUM 8.9   MG 2.1     Troponin:   Recent Labs  Lab 11/24/17  0824   TROPONINI 0.057*       Significant Imaging: none today

## 2017-11-24 NOTE — PROGRESS NOTES
.Pharmacy New Medication Education    Patient accepted medication education.    Pharmacy educated patient on name and purpose of medications and possible side effects, using the teach-back method.     lopressor    Learners of pharmacy medication education included:  Patient    Patient +/- learner response:  Verbalized Understanding, Teachback

## 2017-11-24 NOTE — HPI
89 female with hypertension and mild cognitive impairment admitted to General Surgery for N/V with SBO.  Hospital Medicine consulted for elevated BP and troponin.      Pt seen in her room with her , Ty, present. He states that her SBP usually runs 130s-150s.  Pt denies current chest pain or abdominal pain. Denies h/o heart attack or heart surgery.  Denies use of alcohol, tobacco or illicit drugs.     Pt's mother  in her 70s of unknown cause; father  in MVA.  Pt had 1 brother who  of unknown cause.  Pt had 4 children, 1 is still living.  Pt and  live together by themselves.

## 2017-11-24 NOTE — PLAN OF CARE
Problem: Patient Care Overview  Goal: Plan of Care Review  Outcome: Ongoing (interventions implemented as appropriate)  Plan of care discussed with patient and spouse.  Verbalized understanding.  Patient is Awake and alert.  Sleeps between care but is easily aroused.  Remained in bed during shift but turns self easily.  PICC line placed today and TPN infusing to line.  Surgery refused by patient and spouse.  Pt has not had a bowel movement today and c/o left sided abdominal pain.  Currently resting in bed.  Will continue to monitor.

## 2017-11-24 NOTE — PLAN OF CARE
Problem: Patient Care Overview  Goal: Plan of Care Review  Outcome: Ongoing (interventions implemented as appropriate)  Patient resting in bed, AAOx4. Family at the bedside. IVF infusing as ordered. Medications administered as ordered. Patient complained of some pain early in the shift, PRN medication administered. Asleep for majority of shift. NG tube in place to low continuous wall suction with thick brown drainage. Telemetry on, NSR. Encouraged to call with needs or concerns. Will continue to monitor.

## 2017-11-24 NOTE — PLAN OF CARE
Pt not ready for d/c . Pt and spouse , refusing Surgery for Bowel obstruction.    conservative treatment to continue. TN to follow up.     11/24/17 7630   Discharge Reassessment   Assessment Type Discharge Planning Reassessment   Provided patient/caregiver education on the expected discharge date and the discharge plan Yes   Do you have any problems affording any of your prescribed medications? Yes   Discharge Plan A Home with family   Discharge Plan B Home   Patient choice form signed by patient/caregiver Yes   Can the patient answer the patient profile reliably? Yes, cognitively intact   How does the patient rate their overall health at the present time? Fair   Describe the patient's ability to walk at the present time. Walks with the help of equipment   During the past month, has the patient often been bothered by feeling down, depressed or hopeless? No   During the past month, has the patient often been bothered by little interest or pleasure in doing things? No

## 2017-11-25 LAB
ANION GAP SERPL CALC-SCNC: 5 MMOL/L
ANION GAP SERPL CALC-SCNC: 5 MMOL/L
BUN SERPL-MCNC: 30 MG/DL
BUN SERPL-MCNC: 30 MG/DL
CALCIUM SERPL-MCNC: 8.4 MG/DL
CALCIUM SERPL-MCNC: 8.4 MG/DL
CHLORIDE SERPL-SCNC: 100 MMOL/L
CHLORIDE SERPL-SCNC: 100 MMOL/L
CO2 SERPL-SCNC: 30 MMOL/L
CO2 SERPL-SCNC: 30 MMOL/L
CREAT SERPL-MCNC: 0.8 MG/DL
CREAT SERPL-MCNC: 0.8 MG/DL
EST. GFR  (AFRICAN AMERICAN): >60 ML/MIN/1.73 M^2
EST. GFR  (AFRICAN AMERICAN): >60 ML/MIN/1.73 M^2
EST. GFR  (NON AFRICAN AMERICAN): >60 ML/MIN/1.73 M^2
EST. GFR  (NON AFRICAN AMERICAN): >60 ML/MIN/1.73 M^2
GLUCOSE SERPL-MCNC: 189 MG/DL
GLUCOSE SERPL-MCNC: 189 MG/DL
MAGNESIUM SERPL-MCNC: 2.1 MG/DL
PHOSPHATE SERPL-MCNC: 2.4 MG/DL
POTASSIUM SERPL-SCNC: 3.9 MMOL/L
POTASSIUM SERPL-SCNC: 3.9 MMOL/L
SODIUM SERPL-SCNC: 135 MMOL/L
SODIUM SERPL-SCNC: 135 MMOL/L
TROPONIN I SERPL DL<=0.01 NG/ML-MCNC: 0.03 NG/ML

## 2017-11-25 PROCEDURE — 25000003 PHARM REV CODE 250: Performed by: STUDENT IN AN ORGANIZED HEALTH CARE EDUCATION/TRAINING PROGRAM

## 2017-11-25 PROCEDURE — 94761 N-INVAS EAR/PLS OXIMETRY MLT: CPT

## 2017-11-25 PROCEDURE — 99232 SBSQ HOSP IP/OBS MODERATE 35: CPT | Mod: ,,, | Performed by: STUDENT IN AN ORGANIZED HEALTH CARE EDUCATION/TRAINING PROGRAM

## 2017-11-25 PROCEDURE — 83735 ASSAY OF MAGNESIUM: CPT

## 2017-11-25 PROCEDURE — 11000001 HC ACUTE MED/SURG PRIVATE ROOM

## 2017-11-25 PROCEDURE — 84100 ASSAY OF PHOSPHORUS: CPT

## 2017-11-25 PROCEDURE — 80048 BASIC METABOLIC PNL TOTAL CA: CPT

## 2017-11-25 PROCEDURE — 84484 ASSAY OF TROPONIN QUANT: CPT

## 2017-11-25 PROCEDURE — 63600175 PHARM REV CODE 636 W HCPCS: Performed by: STUDENT IN AN ORGANIZED HEALTH CARE EDUCATION/TRAINING PROGRAM

## 2017-11-25 PROCEDURE — 25000003 PHARM REV CODE 250: Performed by: PHYSICIAN ASSISTANT

## 2017-11-25 RX ADMIN — ENALAPRILAT 1.25 MG: 1.25 INJECTION, SOLUTION INTRAVENOUS at 11:11

## 2017-11-25 RX ADMIN — ESCITALOPRAM OXALATE 20 MG: 20 TABLET, FILM COATED ORAL at 08:11

## 2017-11-25 RX ADMIN — DONEPEZIL HYDROCHLORIDE 10 MG: 5 TABLET, FILM COATED ORAL at 08:11

## 2017-11-25 RX ADMIN — METHOCARBAMOL 500 MG: 500 TABLET ORAL at 05:11

## 2017-11-25 RX ADMIN — ENALAPRILAT 1.25 MG: 1.25 INJECTION, SOLUTION INTRAVENOUS at 05:11

## 2017-11-25 RX ADMIN — DONEPEZIL HYDROCHLORIDE 10 MG: 5 TABLET, FILM COATED ORAL at 09:11

## 2017-11-25 RX ADMIN — ASPIRIN 81 MG 81 MG: 81 TABLET ORAL at 08:11

## 2017-11-25 RX ADMIN — LEUCINE, PHENYLALANINE, LYSINE, METHIONINE, ISOLEUCINE, VALINE, HISTIDINE, THREONINE, TRYPTOPHAN, ALANINE, GLYCINE, ARGININE, PROLINE, SERINE, TYROSINE, SODIUM ACETATE, DIBASIC POTASSIUM PHOSPHATE, MAGNESIUM CHLORIDE, SODIUM CHLORIDE, CALCIUM CHLORIDE, DEXTROSE
365; 280; 290; 200; 300; 290; 240; 210; 90; 1035; 515; 575; 340; 250; 20; 340; 261; 51; 59; 33; 20 INJECTION INTRAVENOUS at 03:11

## 2017-11-25 RX ADMIN — ENOXAPARIN SODIUM 40 MG: 100 INJECTION SUBCUTANEOUS at 09:11

## 2017-11-25 RX ADMIN — MEMANTINE HYDROCHLORIDE 10 MG: 5 TABLET ORAL at 09:11

## 2017-11-25 RX ADMIN — ENALAPRILAT 1.25 MG: 1.25 INJECTION, SOLUTION INTRAVENOUS at 12:11

## 2017-11-25 RX ADMIN — MEMANTINE HYDROCHLORIDE 10 MG: 5 TABLET ORAL at 08:11

## 2017-11-25 RX ADMIN — PANTOPRAZOLE SODIUM 40 MG: 40 TABLET, DELAYED RELEASE ORAL at 08:11

## 2017-11-25 NOTE — ASSESSMENT & PLAN NOTE
Likely 2/2 demand ischemia from elevated BP.  Pt denies CP currently.  EKG shows no ST elevation.  Troponin 0.05>>0.04>>0.039>>0.033.  Monitor on telemetry.

## 2017-11-25 NOTE — PROGRESS NOTES
[facility] Hospital Medicine  Progress Note    Patient Name: Lesa Holder  MRN: [id number]  Patient Class: IP- Inpatient   Admission Date: 2017  Length of Stay: 5 days  Attending Physician: Barney Gardner MD  Primary Care Provider: Miriam Saunders MD        Subjective:     Principal Problem:Intestinal obstruction    HPI:  89 female with hypertension and mild cognitive impairment admitted to General Surgery for N/V with SBO.  Hospital Medicine consulted for elevated BP and troponin.      Pt seen in her room with her , Ty, present. He states that her SBP usually runs 130s-150s.  Pt denies current chest pain or abdominal pain. Denies h/o heart attack or heart surgery.  Denies use of alcohol, tobacco or illicit drugs.     Pt's mother  in her 70s of unknown cause; father  in MVA.  Pt had 1 brother who  of unknown cause.  Pt had 4 children, 1 is still living.  Pt and  live together by themselves.     Hospital Course:  SBP improved since initiation of IV ACEI: -151. Troponin trended down to 0.033. No chest pain.     Interval History: Pt c/o excessive mucous build-up. Denies CP, SOB, abdominal pain.     Review of Systems   Respiratory: Negative for shortness of breath.    Cardiovascular: Negative for chest pain.     Objective:     Vital Signs (Most Recent):  Temp: 98.6 °F (37 °C) (17 1300)  Pulse: 98 (17 1546)  Resp: 16 (17 1546)  BP: (!) 140/75 (17 1300)  SpO2: 96 % (17 1546) Vital Signs (24h Range):  Temp:  [98.1 °F (36.7 °C)-98.8 °F (37.1 °C)] 98.6 °F (37 °C)  Pulse:  [] 98  Resp:  [16-18] 16  SpO2:  [93 %-96 %] 96 %  BP: (121-151)/(59-76) 140/75     Weight: 76.9 kg (169 lb 8.5 oz)  Body mass index is 30.03 kg/m².    Intake/Output Summary (Last 24 hours) at 17 1655  Last data filed at 17 0600   Gross per 24 hour   Intake          2357.28 ml   Output              600 ml   Net          1757.28 ml       Physical Exam   Constitutional: No distress.   Cardiovascular: Normal rate and regular rhythm.    Pulmonary/Chest: Effort normal and breath sounds normal. No respiratory distress.   Neurological: She is alert.   Nursing note and vitals reviewed.      Significant Labs:   BMP:   Recent Labs  Lab 11/25/17  0335   *  189*   *  135*   K 3.9  3.9     100   CO2 30*  30*   BUN 30*  30*   CREATININE 0.8  0.8   CALCIUM 8.4*  8.4*   MG 2.1     Troponin:   Recent Labs  Lab 11/24/17  1417 11/24/17  2045 11/25/17  0335   TROPONINI 0.047* 0.039* 0.033*       Significant Imaging:     Portable abdominal xray 11/25/2017  Comparison: 11/24/2017    Findings:  Single view.    Nasogastric tube has been retracted somewhat since the previous exam, tip and sidehole projected over the expected location of the gastric lumen.  Prominent bowel loops remain, no significant detrimental change.    Assessment/Plan:      * Intestinal obstruction    As per General Surgery, Primary Team           Essential hypertension    -151 since initiation of IV ACEI which will be continued for now: enalaprit 1.25 mg every 6 hours and PRN hydralazine 10 mg every 8 hours. Monitor.           Elevated troponin    Likely 2/2 demand ischemia from elevated BP.  Pt denies CP currently.  EKG shows no ST elevation.  Troponin 0.05>>0.04>>0.039>>0.033.  Monitor on telemetry.           Dementia without behavioral disturbance    Pt alert and oriented.  On namenda and aricept at home which are continued here.              VTE Risk Mitigation         Ordered     enoxaparin injection 40 mg  Every 24 hours (non-standard times)     Route:  Subcutaneous        11/20/17 1956     Medium Risk of VTE  Once      11/20/17 1956     Place sequential compression device  Until discontinued      11/20/17 1956              Livier York PA-C  Department of [facility]   [facility]  Pager: [phone]

## 2017-11-25 NOTE — PROGRESS NOTES
[facility]  General Surgery  Progress Note    Subjective:     Interval History:   Still with nausea with NG in place. XR shows in good position   400 greenish output  Some abdominal pain off and on  PICC placed yesterday, on TPN  Denies chest pain, SOB today      Post-Op Info:  Procedure(s) (LRB):  LAPAROTOMY-EXPLORATORY (N/A)          Medications:  Continuous Infusions:   Amino acid 5% - dextrose 20% (CLINIMIX-E) solution with additives (1L provides 50 gm AA, 200 gm CHO (680 kcal/L dextrose), Na 35, K 30, Mg 5, Ca 4.5, Acetate 80, Cl 39, Phos 15) 75 mL/hr at 11/24/17 1537    dextrose 5% lactated ringers 100 mL/hr at 11/24/17 1400     Scheduled Meds:   aspirin  81 mg Oral Daily    donepezil  10 mg Oral BID    enalaprilat  1.25 mg Intravenous Q6H    enoxaparin  40 mg Subcutaneous Q24H    escitalopram oxalate  20 mg Oral Daily    memantine  10 mg Oral BID    methocarbamol  500 mg Oral QID    pantoprazole  40 mg Oral Daily     PRN Meds:acetaminophen, diphenhydrAMINE, hydrALAZINE, hydrocodone-acetaminophen 5-325mg, influenza, ondansetron, ramelteon, sodium chloride 0.9%     Objective:     Vital Signs (Most Recent):  Temp: 98.1 °F (36.7 °C) (11/25/17 0827)  Pulse: 102 (11/25/17 0827)  Resp: 18 (11/25/17 0827)  BP: (!) 146/76 (11/25/17 0827)  SpO2: 95 % (11/25/17 0912) Vital Signs (24h Range):  Temp:  [98.1 °F (36.7 °C)-98.8 °F (37.1 °C)] 98.1 °F (36.7 °C)  Pulse:  [] 102  Resp:  [16-18] 18  SpO2:  [93 %-97 %] 95 %  BP: (121-151)/(59-76) 146/76       Intake/Output Summary (Last 24 hours) at 11/25/17 1024  Last data filed at 11/25/17 0600   Gross per 24 hour   Intake          2357.28 ml   Output              850 ml   Net          1507.28 ml       Physical Exam   Alert, NAD  Ab soft, nontender, some distention        Significant Labs:  BMP:     Recent Labs  Lab 11/25/17  0335   *  189*   *  135*   K 3.9  3.9     100   CO2 30*  30*   BUN 30*  30*   CREATININE 0.8   0.8   CALCIUM 8.4*  8.4*   MG 2.1     CBC: No results for input(s): WBC, RBC, HGB, HCT, PLT, MCV, MCH, MCHC in the last 48 hours.    Significant Diagnostics:  I have reviewed all pertinent imaging results/findings within the past 24 hours.    Assessment/Plan:     Active Diagnoses:    Diagnosis Date Noted POA    PRINCIPAL PROBLEM:  Intestinal obstruction [K56.609] 11/20/2017 Yes    Elevated troponin [R74.8] 11/24/2017 No    Essential hypertension [I10] 08/12/2016 Yes    Dementia without behavioral disturbance [F03.90] 08/12/2016 Yes      Problems Resolved During this Admission:    Diagnosis Date Noted Date Resolved POA     Will replace NG tube with sump drain  Htn better controlled  Troponins likely related to htn.  TPN, NPO  Replacing lytes  Needs to get OOB  Will continue conservative management for right now.  has refused surgery thus far. Will continue to reassess.        Barney Gardner MD  General Surgery  [facility]

## 2017-11-25 NOTE — SUBJECTIVE & OBJECTIVE
Interval History: Pt c/o excessive mucous build-up. Denies CP, SOB, abdominal pain.     Review of Systems   Respiratory: Negative for shortness of breath.    Cardiovascular: Negative for chest pain.     Objective:     Vital Signs (Most Recent):  Temp: 98.6 °F (37 °C) (11/25/17 1300)  Pulse: 98 (11/25/17 1546)  Resp: 16 (11/25/17 1546)  BP: (!) 140/75 (11/25/17 1300)  SpO2: 96 % (11/25/17 1546) Vital Signs (24h Range):  Temp:  [98.1 °F (36.7 °C)-98.8 °F (37.1 °C)] 98.6 °F (37 °C)  Pulse:  [] 98  Resp:  [16-18] 16  SpO2:  [93 %-96 %] 96 %  BP: (121-151)/(59-76) 140/75     Weight: 76.9 kg (169 lb 8.5 oz)  Body mass index is 30.03 kg/m².    Intake/Output Summary (Last 24 hours) at 11/25/17 1655  Last data filed at 11/25/17 0600   Gross per 24 hour   Intake          2357.28 ml   Output              600 ml   Net          1757.28 ml      Physical Exam   Constitutional: No distress.   Cardiovascular: Normal rate and regular rhythm.    Pulmonary/Chest: Effort normal and breath sounds normal. No respiratory distress.   Neurological: She is alert.   Nursing note and vitals reviewed.      Significant Labs:   BMP:   Recent Labs  Lab 11/25/17  0335   *  189*   *  135*   K 3.9  3.9     100   CO2 30*  30*   BUN 30*  30*   CREATININE 0.8  0.8   CALCIUM 8.4*  8.4*   MG 2.1     Troponin:   Recent Labs  Lab 11/24/17  1417 11/24/17  2045 11/25/17  0335   TROPONINI 0.047* 0.039* 0.033*       Significant Imaging:     Portable abdominal xray 11/25/2017  Comparison: 11/24/2017    Findings:  Single view.    Nasogastric tube has been retracted somewhat since the previous exam, tip and sidehole projected over the expected location of the gastric lumen.  Prominent bowel loops remain, no significant detrimental change.

## 2017-11-25 NOTE — ASSESSMENT & PLAN NOTE
-151 since initiation of IV ACEI which will be continued for now: enalaprit 1.25 mg every 6 hours and PRN hydralazine 10 mg every 8 hours. Monitor.

## 2017-11-25 NOTE — HOSPITAL COURSE
BP on lower end of normal so decreasing dose of IV enalaprit.  Noted increased blood glucose and positive fluid status so stopped D5W IVF and giving 1 dose of IV furosemide for volume overload.

## 2017-11-25 NOTE — PLAN OF CARE
Problem: Patient Care Overview  Goal: Plan of Care Review  Outcome: Ongoing (interventions implemented as appropriate)  Plan of care discussed with patient and spouse. Verbalized understanding.  Pt is awake and alert.  Sleeping between care.  Up with assistance to bedside commode for voiding.  No bowel movement today. NG tube taken out and replaced with a smaller size per MD order.  Remains on low intermittent suction.  Pt continues to spit large amounts of saliva into emesis bucket due to throat discomfort from NG tube.  Is able to swallow pills without difficulty.  TPN and fluids continued.  Pt resting with spouse at side.  Will continue to monitor.

## 2017-11-25 NOTE — PLAN OF CARE
Problem: Patient Care Overview  Goal: Plan of Care Review  Outcome: Ongoing (interventions implemented as appropriate)  Patient resting in bed, AAOx4. TPN and fluids infusing as ordered. Medications administered as ordered. No complaints of pain or discomfort. NG tube in place to low wall suction with greenish brown drainage. Asleep on and off through the night. Patient ambulated to commode with assistance, safety maintained. Telemetry on, NSR. Encouraged to call with needs or concerns. Will continue to monitor.

## 2017-11-26 LAB
ANION GAP SERPL CALC-SCNC: 9 MMOL/L
ANION GAP SERPL CALC-SCNC: 9 MMOL/L
BUN SERPL-MCNC: 30 MG/DL
BUN SERPL-MCNC: 30 MG/DL
CALCIUM SERPL-MCNC: 8.4 MG/DL
CALCIUM SERPL-MCNC: 8.4 MG/DL
CHLORIDE SERPL-SCNC: 99 MMOL/L
CHLORIDE SERPL-SCNC: 99 MMOL/L
CO2 SERPL-SCNC: 26 MMOL/L
CO2 SERPL-SCNC: 26 MMOL/L
CREAT SERPL-MCNC: 0.8 MG/DL
CREAT SERPL-MCNC: 0.8 MG/DL
EST. GFR  (AFRICAN AMERICAN): >60 ML/MIN/1.73 M^2
EST. GFR  (AFRICAN AMERICAN): >60 ML/MIN/1.73 M^2
EST. GFR  (NON AFRICAN AMERICAN): >60 ML/MIN/1.73 M^2
EST. GFR  (NON AFRICAN AMERICAN): >60 ML/MIN/1.73 M^2
GLUCOSE SERPL-MCNC: 182 MG/DL
GLUCOSE SERPL-MCNC: 182 MG/DL
MAGNESIUM SERPL-MCNC: 2 MG/DL
PHOSPHATE SERPL-MCNC: 2.6 MG/DL
POTASSIUM SERPL-SCNC: 3.6 MMOL/L
POTASSIUM SERPL-SCNC: 3.6 MMOL/L
SODIUM SERPL-SCNC: 134 MMOL/L
SODIUM SERPL-SCNC: 134 MMOL/L

## 2017-11-26 PROCEDURE — 93005 ELECTROCARDIOGRAM TRACING: CPT

## 2017-11-26 PROCEDURE — 63600175 PHARM REV CODE 636 W HCPCS: Performed by: PHYSICIAN ASSISTANT

## 2017-11-26 PROCEDURE — 11000001 HC ACUTE MED/SURG PRIVATE ROOM

## 2017-11-26 PROCEDURE — 94761 N-INVAS EAR/PLS OXIMETRY MLT: CPT

## 2017-11-26 PROCEDURE — 93010 ELECTROCARDIOGRAM REPORT: CPT | Mod: ,,, | Performed by: INTERNAL MEDICINE

## 2017-11-26 PROCEDURE — 83735 ASSAY OF MAGNESIUM: CPT

## 2017-11-26 PROCEDURE — 80048 BASIC METABOLIC PNL TOTAL CA: CPT

## 2017-11-26 PROCEDURE — 84100 ASSAY OF PHOSPHORUS: CPT

## 2017-11-26 PROCEDURE — 99232 SBSQ HOSP IP/OBS MODERATE 35: CPT | Mod: ,,, | Performed by: STUDENT IN AN ORGANIZED HEALTH CARE EDUCATION/TRAINING PROGRAM

## 2017-11-26 PROCEDURE — G8978 MOBILITY CURRENT STATUS: HCPCS | Mod: CK

## 2017-11-26 PROCEDURE — 25000003 PHARM REV CODE 250: Performed by: STUDENT IN AN ORGANIZED HEALTH CARE EDUCATION/TRAINING PROGRAM

## 2017-11-26 PROCEDURE — G8979 MOBILITY GOAL STATUS: HCPCS | Mod: CJ

## 2017-11-26 PROCEDURE — 97116 GAIT TRAINING THERAPY: CPT

## 2017-11-26 PROCEDURE — 25000003 PHARM REV CODE 250: Performed by: PHYSICIAN ASSISTANT

## 2017-11-26 PROCEDURE — 97161 PT EVAL LOW COMPLEX 20 MIN: CPT

## 2017-11-26 PROCEDURE — 63600175 PHARM REV CODE 636 W HCPCS: Performed by: STUDENT IN AN ORGANIZED HEALTH CARE EDUCATION/TRAINING PROGRAM

## 2017-11-26 RX ORDER — ENALAPRILAT 1.25 MG/ML
2.5 INJECTION INTRAVENOUS EVERY 6 HOURS
Status: DISCONTINUED | OUTPATIENT
Start: 2017-11-26 | End: 2017-11-28

## 2017-11-26 RX ADMIN — METHOCARBAMOL 500 MG: 500 TABLET ORAL at 12:11

## 2017-11-26 RX ADMIN — DONEPEZIL HYDROCHLORIDE 10 MG: 5 TABLET, FILM COATED ORAL at 09:11

## 2017-11-26 RX ADMIN — ASPIRIN 81 MG 81 MG: 81 TABLET ORAL at 08:11

## 2017-11-26 RX ADMIN — METHOCARBAMOL 500 MG: 500 TABLET ORAL at 11:11

## 2017-11-26 RX ADMIN — DONEPEZIL HYDROCHLORIDE 10 MG: 5 TABLET, FILM COATED ORAL at 08:11

## 2017-11-26 RX ADMIN — PANTOPRAZOLE SODIUM 40 MG: 40 TABLET, DELAYED RELEASE ORAL at 08:11

## 2017-11-26 RX ADMIN — POTASSIUM PHOSPHATE, MONOBASIC AND POTASSIUM PHOSPHATE, DIBASIC 40 MMOL: 224; 236 INJECTION, SOLUTION, CONCENTRATE INTRAVENOUS at 10:11

## 2017-11-26 RX ADMIN — ENOXAPARIN SODIUM 40 MG: 100 INJECTION SUBCUTANEOUS at 09:11

## 2017-11-26 RX ADMIN — LEUCINE, PHENYLALANINE, LYSINE, METHIONINE, ISOLEUCINE, VALINE, HISTIDINE, THREONINE, TRYPTOPHAN, ALANINE, GLYCINE, ARGININE, PROLINE, SERINE, TYROSINE, SODIUM ACETATE, DIBASIC POTASSIUM PHOSPHATE, MAGNESIUM CHLORIDE, SODIUM CHLORIDE, CALCIUM CHLORIDE, DEXTROSE
365; 280; 290; 200; 300; 290; 240; 210; 90; 1035; 515; 575; 340; 250; 20; 340; 261; 51; 59; 33; 20 INJECTION INTRAVENOUS at 06:11

## 2017-11-26 RX ADMIN — MEMANTINE HYDROCHLORIDE 10 MG: 5 TABLET ORAL at 09:11

## 2017-11-26 RX ADMIN — ENALAPRILAT 2.5 MG: 1.25 INJECTION, SOLUTION INTRAVENOUS at 11:11

## 2017-11-26 RX ADMIN — MEMANTINE HYDROCHLORIDE 10 MG: 5 TABLET ORAL at 08:11

## 2017-11-26 RX ADMIN — METHOCARBAMOL 500 MG: 500 TABLET ORAL at 05:11

## 2017-11-26 RX ADMIN — HYDRALAZINE HYDROCHLORIDE 10 MG: 20 INJECTION INTRAMUSCULAR; INTRAVENOUS at 04:11

## 2017-11-26 RX ADMIN — ESCITALOPRAM OXALATE 20 MG: 20 TABLET, FILM COATED ORAL at 08:11

## 2017-11-26 RX ADMIN — ENALAPRILAT 2.5 MG: 1.25 INJECTION, SOLUTION INTRAVENOUS at 05:11

## 2017-11-26 RX ADMIN — ENALAPRILAT 1.25 MG: 1.25 INJECTION, SOLUTION INTRAVENOUS at 05:11

## 2017-11-26 RX ADMIN — ENALAPRILAT 1.25 MG: 1.25 INJECTION, SOLUTION INTRAVENOUS at 12:11

## 2017-11-26 NOTE — SUBJECTIVE & OBJECTIVE
"Interval History: Pt c/o nose and throat irritation from NG tube. States no BM, + N/V. States considering the surgery as she "can't live like this".  Will discuss with her  when he visits.      Review of Systems   Respiratory: Negative for shortness of breath.    Cardiovascular: Negative for chest pain.   Gastrointestinal: Positive for nausea and vomiting.     Objective:     Vital Signs (Most Recent):  Temp: 98.6 °F (37 °C) (11/26/17 0740)  Pulse: 110 (11/26/17 0740)  Resp: 19 (11/26/17 0740)  BP: 139/84 (11/26/17 0740)  SpO2: 97 % (11/26/17 0833) Vital Signs (24h Range):  Temp:  [96.4 °F (35.8 °C)-98.7 °F (37.1 °C)] 98.6 °F (37 °C)  Pulse:  [] 110  Resp:  [16-19] 19  SpO2:  [94 %-97 %] 97 %  BP: (139-166)/(75-84) 139/84     Weight: 76.9 kg (169 lb 8.5 oz)  Body mass index is 30.03 kg/m².    Intake/Output Summary (Last 24 hours) at 11/26/17 0956  Last data filed at 11/26/17 0710   Gross per 24 hour   Intake          2408.33 ml   Output             1400 ml   Net          1008.33 ml      Physical Exam   Constitutional: No distress.   Cardiovascular: Normal rate and regular rhythm.    Pulmonary/Chest: Effort normal and breath sounds normal. No respiratory distress.   Abdominal: There is no tenderness.   Hypoactive to absent bowel sounds   Neurological: She is alert.   Nursing note and vitals reviewed.      Significant Labs:   BMP:   Recent Labs  Lab 11/26/17  0722   *  182*   *  134*   K 3.6  3.6   CL 99  99   CO2 26  26   BUN 30*  30*   CREATININE 0.8  0.8   CALCIUM 8.4*  8.4*   MG 2.0       Significant Imaging: no new  "

## 2017-11-26 NOTE — PROGRESS NOTES
[facility]  General Surgery  Progress Note    Subjective:     History of Present Illness:  No notes on file    Post-Op Info:  Procedure(s) (LRB):  LAPAROTOMY-EXPLORATORY (N/A)         Interval History:   Pain somewhat improved but still with some nausea  NG output greenish, 850 yesterday  On TPN    Medications:  Continuous Infusions:   Amino acid 5% - dextrose 20% (CLINIMIX-E) solution with additives (1L provides 50 gm AA, 200 gm CHO (680 kcal/L dextrose), Na 35, K 30, Mg 5, Ca 4.5, Acetate 80, Cl 39, Phos 15) 75 mL/hr at 11/25/17 1527    dextrose 5% lactated ringers 50 mL/hr at 11/25/17 1134     Scheduled Meds:   aspirin  81 mg Oral Daily    donepezil  10 mg Oral BID    enalaprilat  2.5 mg Intravenous Q6H    enoxaparin  40 mg Subcutaneous Q24H    escitalopram oxalate  20 mg Oral Daily    [START ON 12/1/2017] fat emulsion 20%  250 mL Intravenous Every Fri    memantine  10 mg Oral BID    methocarbamol  500 mg Oral QID    pantoprazole  40 mg Oral Daily    potassium phosphate IVPB  40 mmol Intravenous Once     PRN Meds:acetaminophen, diphenhydrAMINE, hydrALAZINE, hydrocodone-acetaminophen 5-325mg, influenza, ondansetron, ramelteon, sodium chloride 0.9%     Review of patient's allergies indicates:  No Known Allergies  Objective:     Vital Signs (Most Recent):  Temp: 98.6 °F (37 °C) (11/26/17 0740)  Pulse: 110 (11/26/17 0740)  Resp: 19 (11/26/17 0740)  BP: 139/84 (11/26/17 0740)  SpO2: 97 % (11/26/17 0833) Vital Signs (24h Range):  Temp:  [96.4 °F (35.8 °C)-98.7 °F (37.1 °C)] 98.6 °F (37 °C)  Pulse:  [] 110  Resp:  [16-19] 19  SpO2:  [94 %-97 %] 97 %  BP: (139-166)/(75-84) 139/84     Weight: 76.9 kg (169 lb 8.5 oz)  Body mass index is 30.03 kg/m².    Intake/Output - Last 3 Shifts       11/24 0700 - 11/25 0659 11/25 0700 - 11/26 0659 11/26 0700 - 11/27 0659    P.O.  0     I.V. (mL/kg) 1100 (14.3) 1128.3 (14.7)     IV Piggyback       TPN 1257.3 1280     Total Intake(mL/kg) 2357.3 (30.7)  2408.3 (31.3)     Urine (mL/kg/hr) 450 (0.2) 400 (0.2) 150 (0.5)    Drains 400 (0.2) 850 (0.5)     Stool  0 (0)     Total Output 850 1250 150    Net +1507.3 +1158.3 -150           Urine Occurrence  1 x     Stool Occurrence  0 x           Physical Exam   Ab soft, nontender. Mild distention  NG in place    Significant Labs:  CBC:   Recent Labs  Lab 11/21/17  0507   WBC 6.93   RBC 3.96*   HGB 11.5*   HCT 35.5*      MCV 90   MCH 29.0   MCHC 32.4     BMP:   Recent Labs  Lab 11/26/17  0722   *  182*   *  134*   K 3.6  3.6   CL 99  99   CO2 26  26   BUN 30*  30*   CREATININE 0.8  0.8   CALCIUM 8.4*  8.4*   MG 2.0       Significant Diagnostics:  I have reviewed all pertinent imaging results/findings within the past 24 hours.    Assessment/Plan:     * Intestinal obstruction    Discussed problem with patient and her  again. Seem more agreeable to surgery. Wants to discuss with rest of family  Continue NPO, NG, TPN  Possible OR soon for ex lap              Barney Gardner MD  General Surgery  [facility]

## 2017-11-26 NOTE — PLAN OF CARE
Problem: Physical Therapy Goal  Goal: Physical Therapy Goal  Goals to be met by:      Patient will increase functional independence with mobility by performin. Supine <> sit with Emery  2. Sit <> stand transfer with Modified Emery with/without RW  3. Gait  x 150 feet with Modified Emery using Rolling Walker.     Outcome: Ongoing (interventions implemented as appropriate)  Initial evaluation complete PT to follow.

## 2017-11-26 NOTE — ASSESSMENT & PLAN NOTE
Pt alert and oriented.  Good insight into her health problem and plan for the future.  On namenda and aricept at home which are continued here.

## 2017-11-26 NOTE — ASSESSMENT & PLAN NOTE
"Pt states she is considering the surgery as she "can't live like this".  Will discuss with her .  As per General Surgery, Primary Team     "

## 2017-11-26 NOTE — PROGRESS NOTES
"[facility] Hospital Medicine  Progress Note    Patient Name: Lesa Holder  MRN: [id number]  Patient Class: IP- Inpatient   Admission Date: 2017  Length of Stay: 6 days  Attending Physician: Barney Gardner MD  Primary Care Provider: Miriam Saunders MD        Subjective:     Principal Problem:Intestinal obstruction    HPI:  89 female with hypertension and mild cognitive impairment admitted to General Surgery for N/V with SBO.  Hospital Medicine consulted for elevated BP and troponin.      Pt seen in her room with her , Ty, present. He states that her SBP usually runs 130s-150s.  Pt denies current chest pain or abdominal pain. Denies h/o heart attack or heart surgery.  Denies use of alcohol, tobacco or illicit drugs.     Pt's mother  in her 70s of unknown cause; father  in MVA.  Pt had 1 brother who  of unknown cause.  Pt had 4 children, 1 is still living.  Pt and  live together by themselves.     Hospital Course:  SBP improved since initiation of IV ACEI. Troponin trended down to 0.033. No chest pain.     Interval History: Pt c/o nose and throat irritation from NG tube. States no BM, + N/V. States considering the surgery as she "can't live like this".  Will discuss with her  when he visits.      Review of Systems   Respiratory: Negative for shortness of breath.    Cardiovascular: Negative for chest pain.   Gastrointestinal: Positive for nausea and vomiting.     Objective:     Vital Signs (Most Recent):  Temp: 98.6 °F (37 °C) (17 0740)  Pulse: 110 (17 0740)  Resp: 19 (17 0740)  BP: 139/84 (17 0740)  SpO2: 97 % (17 0833) Vital Signs (24h Range):  Temp:  [96.4 °F (35.8 °C)-98.7 °F (37.1 °C)] 98.6 °F (37 °C)  Pulse:  [] 110  Resp:  [16-19] 19  SpO2:  [94 %-97 %] 97 %  BP: (139-166)/(75-84) 139/84     Weight: 76.9 kg (169 lb 8.5 oz)  Body mass index is 30.03 kg/m².    Intake/Output Summary (Last 24 hours) at 17 " "0956  Last data filed at 11/26/17 0710   Gross per 24 hour   Intake          2408.33 ml   Output             1400 ml   Net          1008.33 ml      Physical Exam   Constitutional: No distress.   Cardiovascular: Normal rate and regular rhythm.    Pulmonary/Chest: Effort normal and breath sounds normal. No respiratory distress.   Abdominal: There is no tenderness.   Hypoactive to absent bowel sounds   Neurological: She is alert.   Nursing note and vitals reviewed.      Significant Labs:   BMP:   Recent Labs  Lab 11/26/17  0722   *  182*   *  134*   K 3.6  3.6   CL 99  99   CO2 26  26   BUN 30*  30*   CREATININE 0.8  0.8   CALCIUM 8.4*  8.4*   MG 2.0       Significant Imaging: no new    Assessment/Plan:      * Intestinal obstruction    Pt states she is considering the surgery as she "can't live like this".  Will discuss with her .  As per General Surgery, Primary Team           Essential hypertension    -166 since initiation of IV ACEI.  Increase enalaprit from 1.25 mg to 2.5 every 6 hours and PRN hydralazine 10 mg every 8 hours. Monitor.           Elevated troponin    Likely 2/2 demand ischemia from elevated BP.  Pt denies CP currently.  EKG shows no ST elevation.  Troponin 0.05>>0.04>>0.039>>0.033.  Monitor on telemetry.           Dementia without behavioral disturbance    Pt alert and oriented.  Good insight into her health problem and plan for the future.  On namenda and aricept at home which are continued here.              VTE Risk Mitigation         Ordered     enoxaparin injection 40 mg  Every 24 hours (non-standard times)     Route:  Subcutaneous        11/20/17 1956     Medium Risk of VTE  Once      11/20/17 1956     Place sequential compression device  Until discontinued      11/20/17 1956              Livier York PA-C  Department of Hospital Medicine   [facility]  Pager: [phone]  "

## 2017-11-26 NOTE — ASSESSMENT & PLAN NOTE
-166 since initiation of IV ACEI.  Increase enalaprit from 1.25 mg to 2.5 every 6 hours and PRN hydralazine 10 mg every 8 hours. Monitor.

## 2017-11-26 NOTE — SUBJECTIVE & OBJECTIVE
Interval History:   Pain somewhat improved but still with some nausea  NG output greenish, 850 yesterday  On TPN    Medications:  Continuous Infusions:   Amino acid 5% - dextrose 20% (CLINIMIX-E) solution with additives (1L provides 50 gm AA, 200 gm CHO (680 kcal/L dextrose), Na 35, K 30, Mg 5, Ca 4.5, Acetate 80, Cl 39, Phos 15) 75 mL/hr at 11/25/17 1527    dextrose 5% lactated ringers 50 mL/hr at 11/25/17 1134     Scheduled Meds:   aspirin  81 mg Oral Daily    donepezil  10 mg Oral BID    enalaprilat  2.5 mg Intravenous Q6H    enoxaparin  40 mg Subcutaneous Q24H    escitalopram oxalate  20 mg Oral Daily    [START ON 12/1/2017] fat emulsion 20%  250 mL Intravenous Every Fri    memantine  10 mg Oral BID    methocarbamol  500 mg Oral QID    pantoprazole  40 mg Oral Daily    potassium phosphate IVPB  40 mmol Intravenous Once     PRN Meds:acetaminophen, diphenhydrAMINE, hydrALAZINE, hydrocodone-acetaminophen 5-325mg, influenza, ondansetron, ramelteon, sodium chloride 0.9%     Review of patient's allergies indicates:  No Known Allergies  Objective:     Vital Signs (Most Recent):  Temp: 98.6 °F (37 °C) (11/26/17 0740)  Pulse: 110 (11/26/17 0740)  Resp: 19 (11/26/17 0740)  BP: 139/84 (11/26/17 0740)  SpO2: 97 % (11/26/17 0833) Vital Signs (24h Range):  Temp:  [96.4 °F (35.8 °C)-98.7 °F (37.1 °C)] 98.6 °F (37 °C)  Pulse:  [] 110  Resp:  [16-19] 19  SpO2:  [94 %-97 %] 97 %  BP: (139-166)/(75-84) 139/84     Weight: 76.9 kg (169 lb 8.5 oz)  Body mass index is 30.03 kg/m².    Intake/Output - Last 3 Shifts       11/24 0700 - 11/25 0659 11/25 0700 - 11/26 0659 11/26 0700 - 11/27 0659    P.O.  0     I.V. (mL/kg) 1100 (14.3) 1128.3 (14.7)     IV Piggyback       TPN 1257.3 1280     Total Intake(mL/kg) 2357.3 (30.7) 2408.3 (31.3)     Urine (mL/kg/hr) 450 (0.2) 400 (0.2) 150 (0.5)    Drains 400 (0.2) 850 (0.5)     Stool  0 (0)     Total Output 850 1250 150    Net +1507.3 +1158.3 -150           Urine Occurrence  1 x      Stool Occurrence  0 x           Physical Exam   Ab soft, nontender. Mild distention  NG in place    Significant Labs:  CBC:   Recent Labs  Lab 11/21/17  0507   WBC 6.93   RBC 3.96*   HGB 11.5*   HCT 35.5*      MCV 90   MCH 29.0   MCHC 32.4     BMP:   Recent Labs  Lab 11/26/17  0722   *  182*   *  134*   K 3.6  3.6   CL 99  99   CO2 26  26   BUN 30*  30*   CREATININE 0.8  0.8   CALCIUM 8.4*  8.4*   MG 2.0       Significant Diagnostics:  I have reviewed all pertinent imaging results/findings within the past 24 hours.

## 2017-11-26 NOTE — ASSESSMENT & PLAN NOTE
Discussed problem with patient and her  again. Seem more agreeable to surgery. Wants to discuss with rest of family  Continue NPO, NG, TPN  Possible OR soon for ex lap

## 2017-11-26 NOTE — PT/OT/SLP EVAL
"Physical Therapy  Evaluation/ Treatment     Lesa Holder   MRN: [id number]   Admitting Diagnosis: Intestinal obstruction    PT Received On: 11/26/17  PT Start Time: 1518     PT Stop Time: 1543    PT Total Time (min): 25 min       Billable Minutes:  Evaluation 15 and Gait [id number]    Diagnosis: Intestinal obstruction  The primary encounter diagnosis was Bowel obstruction. Diagnoses of Intestinal obstruction, unspecified cause, unspecified whether partial or complete, Elevated troponin, and Abnormal EKG were also pertinent to this visit.     Past Medical History:   Diagnosis Date    Cataract 1998    remove from left eye    Dementia     Depression     GERD (gastroesophageal reflux disease)     Hypertension       Past Surgical History:   Procedure Laterality Date    HYSTERECTOMY         Referring physician: Barney Gardner MD  Date referred to PT: 11/26/2017     General Precautions: Standard, fall, NPO  Orthopedic Precautions: N/A   Braces: N/A       Do you have any cultural, spiritual, Episcopalian conflicts, given your current situation?: None verbalized to PT    Patient History:  Living Environment Comment: Lives with spouse in [facility] with NSTE. Mainly uses jacuzzi tub but also has WIS with shower chair. Uses SPC for community distances only 2* to R knee pain. Pt denies and recent falls/near falls. Pt reports being indep with all ADLs and IADLs.   Equipment Currently Used at Home: cane, straight  DME owned (not currently used): rolling walker    Previous Level of Function:  Ambulation Skills: needs device  Transfer Skills: needs device  ADL Skills: needs device  Work/Leisure Activity: needs device    Subjective:  Communicated with QUINTON Paul prior to session.  Pt agreeable to PT evaluation/Treatment session.   Chief Complaint: "acid burning my throat."  Patient goals: improve comfort.     Pain/Comfort  Pain Rating 1: 0/10  Pain Rating Post-Intervention 1: 0/10      Objective:   Patient found with: bed alarm, SCD, " NG tube, peripheral IV     Cognitive Exam:  Oriented to: Person, Place, Time and Situation    Follows Commands/attention: Follows two-step commands  Communication: clear/fluent  Safety awareness/insight to disability: intact    Physical Exam:  Postural examination/scapula alignment: Rounded shoulder, Head forward and Kyphosis    Skin integrity: Visible skin intact  Edema: None noted     Sensation:   Intact    Lower Extremity Range of Motion:  Right Lower Extremity: WFL  Left Lower Extremity: WFL    Lower Extremity Strength:  Right Lower Extremity: Grossly 4-/5  Left Lower Extremity: Grossly 4-/5     Fine motor coordination:  Intact    Gross motor coordination: WFL    Functional Mobility:  Bed Mobility:  Scooting/Bridging: Minimum Assistance  Supine to Sit: Minimum Assistance  Sit to Supine: Minimum Assistance, With leg lift    Transfers:  Sit <> Stand Assistance: Contact Guard Assistance  Sit <> Stand Assistive Device: Rolling Walker    Gait:   Gait Distance: ~50 feet with RW and CGA  Gait Deviation(s): decreased yoanna, increased time in double stance, decreased velocity of limb motion      Balance:   Static Sit: GOOD-: Takes MODERATE challenges from all directions but inconsistently  Dynamic Sit: FAIR+: Maintains balance through MINIMAL excursions of active trunk motion  Static Stand: FAIR: Maintains without assist but unable to take challenges  Dynamic stand: FAIR: Needs CONTACT GUARD during gait    Therapeutic Activities and Exercises:  -ambulated ~50 feet with RW and CGA, occasionally Min A for RW management     AM-PAC 6 CLICK MOBILITY  How much help from another person does this patient currently need?   1 = Unable, Total/Dependent Assistance  2 = A lot, Maximum/Moderate Assistance  3 = A little, Minimum/Contact Guard/Supervision  4 = None, Modified [place]/Independent    Turning over in bed (including adjusting bedclothes, sheets and blankets)?: 3  Sitting down on and standing up from a chair with arms  (e.g., wheelchair, bedside commode, etc.): 3  Moving from lying on back to sitting on the side of the bed?: 3  Moving to and from a bed to a chair (including a wheelchair)?: 3  Need to walk in hospital room?: 3  Climbing 3-5 steps with a railing?: 2  Total Score: 17     AM-PAC Raw Score CMS G-Code Modifier Level of Impairment Assistance   6 % Total / Unable   7 - 9 CM 80 - 100% Maximal Assist   10 - 14 CL 60 - 80% Moderate Assist   15 - 19 CK 40 - 60% Moderate Assist   20 - 22 CJ 20 - 40% Minimal Assist   23 CI 1-20% SBA / CGA   24 CH 0% Independent/ Mod I     Patient left HOB elevated with all lines intact, call button in reach, bed alarm on and grandaughter  present.    Assessment:   Lesa Holder is a 89 y.o. female with a medical diagnosis of Intestinal obstruction and presents with impairments listed below. Pt would benefit from continued PT services to improve current impairments and return to a more independent functional mobility level.     Rehab identified problem list/impairments: Rehab identified problem list/impairments: weakness, impaired endurance, impaired self care skills, impaired functional mobilty, impaired balance, gait instability, decreased lower extremity function    Rehab potential is good.    Activity tolerance: Fair    Discharge recommendations: Discharge Facility/Level Of Care Needs:  (TBD)     Barriers to discharge:      Equipment recommendations: Equipment Needed After Discharge:  (TBD)     GOALS:    Physical Therapy Goals        Problem: Physical Therapy Goal    Goal Priority Disciplines Outcome Goal Variances Interventions   Physical Therapy Goal     PT/OT, PT Ongoing (interventions implemented as appropriate)     Description:  Goals to be met by:      Patient will increase functional independence with mobility by performin. Supine <> sit with [place]  2. Sit <> stand transfer with Modified [place] with/without RW  3. Gait  x 150 feet with Modified  [place] using Rolling Walker.                       PLAN:    Patient to be seen 6 x/week to address the above listed problems via gait training, therapeutic activities, therapeutic exercises  Plan of Care expires: 12/26/17  Plan of Care reviewed with: patient          Fredi Kramer, PT  11/26/2017

## 2017-11-26 NOTE — PLAN OF CARE
Problem: Patient Care Overview  Goal: Plan of Care Review  Outcome: Ongoing (interventions implemented as appropriate)  Plan of care discussed with patient and spouse.  Verbalized understanding.  Patient is AAOx4. Up with one person assistance to bedside commode.  TPN and fluids infusing continuously.  Pt c/o throat discomfort from NG tube.  Ng is set to low continuous suction.  Pt and spouse agreed to surgery this am.  Case request placed for ex/lap in morning.  PT ambulated with patient in hallway using walker.  Resting in bed with spouse and daughter at side.  Will continue to monitor.

## 2017-11-27 ENCOUNTER — SURGERY (OUTPATIENT)
Age: 82
End: 2017-11-27

## 2017-11-27 PROBLEM — I27.20 PULMONARY HYPERTENSION: Status: ACTIVE | Noted: 2017-11-27

## 2017-11-27 PROBLEM — I35.0 AORTIC STENOSIS, MODERATE: Status: ACTIVE | Noted: 2017-11-27

## 2017-11-27 LAB
ALBUMIN SERPL BCP-MCNC: 2.2 G/DL
ALLENS TEST: ABNORMAL
ALP SERPL-CCNC: 66 U/L
ALT SERPL W/O P-5'-P-CCNC: 16 U/L
ANION GAP SERPL CALC-SCNC: 9 MMOL/L
AORTIC VALVE STENOSIS: ABNORMAL
AST SERPL-CCNC: 25 U/L
BASOPHILS # BLD AUTO: 0.02 K/UL
BASOPHILS NFR BLD: 0.2 %
BILIRUB DIRECT SERPL-MCNC: 0.3 MG/DL
BILIRUB SERPL-MCNC: 0.5 MG/DL
BUN SERPL-MCNC: 26 MG/DL
CALCIUM SERPL-MCNC: 8 MG/DL
CHLORIDE SERPL-SCNC: 99 MMOL/L
CO2 SERPL-SCNC: 25 MMOL/L
CREAT SERPL-MCNC: 0.7 MG/DL
DELSYS: ABNORMAL
DIFFERENTIAL METHOD: ABNORMAL
EOSINOPHIL # BLD AUTO: 0.1 K/UL
EOSINOPHIL NFR BLD: 0.5 %
ERYTHROCYTE [DISTWIDTH] IN BLOOD BY AUTOMATED COUNT: 14.3 %
ERYTHROCYTE [SEDIMENTATION RATE] IN BLOOD BY WESTERGREN METHOD: 16 MM/H
EST. GFR  (AFRICAN AMERICAN): >60 ML/MIN/1.73 M^2
EST. GFR  (NON AFRICAN AMERICAN): >60 ML/MIN/1.73 M^2
ESTIMATED PA SYSTOLIC PRESSURE: 41.44
FIO2: 60
GLUCOSE SERPL-MCNC: 195 MG/DL
HCO3 UR-SCNC: 23.8 MMOL/L (ref 24–28)
HCT VFR BLD AUTO: 35.3 %
HCT VFR BLD CALC: 34 %PCV (ref 36–54)
HGB BLD-MCNC: 11.7 G/DL
HGB BLD-MCNC: 12 G/DL
LYMPHOCYTES # BLD AUTO: 1.5 K/UL
LYMPHOCYTES NFR BLD: 16.7 %
MAGNESIUM SERPL-MCNC: 1.9 MG/DL
MCH RBC QN AUTO: 29 PG
MCHC RBC AUTO-ENTMCNC: 33.1 G/DL
MCV RBC AUTO: 88 FL
MITRAL VALVE MOBILITY: ABNORMAL
MODE: ABNORMAL
MONOCYTES # BLD AUTO: 1.8 K/UL
MONOCYTES NFR BLD: 19.7 %
NEUTROPHILS # BLD AUTO: 5.5 K/UL
NEUTROPHILS NFR BLD: 59.8 %
PCO2 BLDA: 38.8 MMHG (ref 35–45)
PEEP: 5
PH SMN: 7.39 [PH] (ref 7.35–7.45)
PHOSPHATE SERPL-MCNC: 3.4 MG/DL
PLATELET # BLD AUTO: 281 K/UL
PMV BLD AUTO: 9.4 FL
PO2 BLDA: 191 MMHG (ref 80–100)
POC BE: -1 MMOL/L
POC IONIZED CALCIUM: 1.23 MMOL/L (ref 1.06–1.42)
POC SATURATED O2: 100 % (ref 95–100)
POC TCO2: 25 MMOL/L (ref 23–27)
POTASSIUM BLD-SCNC: 3.4 MMOL/L (ref 3.5–5.1)
POTASSIUM SERPL-SCNC: 3.6 MMOL/L
PROT SERPL-MCNC: 5.7 G/DL
RBC # BLD AUTO: 4.03 M/UL
RETIRED EF AND QEF - SEE NOTES: 55 (ref 55–65)
SAMPLE: ABNORMAL
SITE: ABNORMAL
SODIUM BLD-SCNC: 136 MMOL/L (ref 136–145)
SODIUM SERPL-SCNC: 133 MMOL/L
TRICUSPID VALVE REGURGITATION: ABNORMAL
VT: 380
WBC # BLD AUTO: 9.22 K/UL

## 2017-11-27 PROCEDURE — 94002 VENT MGMT INPAT INIT DAY: CPT

## 2017-11-27 PROCEDURE — 3E0336Z INTRODUCTION OF NUTRITIONAL SUBSTANCE INTO PERIPHERAL VEIN, PERCUTANEOUS APPROACH: ICD-10-PCS | Performed by: STUDENT IN AN ORGANIZED HEALTH CARE EDUCATION/TRAINING PROGRAM

## 2017-11-27 PROCEDURE — 27000221 HC OXYGEN, UP TO 24 HOURS

## 2017-11-27 PROCEDURE — 63600175 PHARM REV CODE 636 W HCPCS: Performed by: NURSE ANESTHETIST, CERTIFIED REGISTERED

## 2017-11-27 PROCEDURE — 99233 SBSQ HOSP IP/OBS HIGH 50: CPT | Mod: ,,, | Performed by: STUDENT IN AN ORGANIZED HEALTH CARE EDUCATION/TRAINING PROGRAM

## 2017-11-27 PROCEDURE — 25000242 PHARM REV CODE 250 ALT 637 W/ HCPCS: Performed by: HOSPITALIST

## 2017-11-27 PROCEDURE — G8980 MOBILITY D/C STATUS: HCPCS | Mod: CK

## 2017-11-27 PROCEDURE — 37000009 HC ANESTHESIA EA ADD 15 MINS: Performed by: STUDENT IN AN ORGANIZED HEALTH CARE EDUCATION/TRAINING PROGRAM

## 2017-11-27 PROCEDURE — 5A1945Z RESPIRATORY VENTILATION, 24-96 CONSECUTIVE HOURS: ICD-10-PCS | Performed by: STUDENT IN AN ORGANIZED HEALTH CARE EDUCATION/TRAINING PROGRAM

## 2017-11-27 PROCEDURE — 88307 TISSUE EXAM BY PATHOLOGIST: CPT | Mod: 26,,, | Performed by: PATHOLOGY

## 2017-11-27 PROCEDURE — 27201423 OPTIME MED/SURG SUP & DEVICES STERILE SUPPLY: Performed by: STUDENT IN AN ORGANIZED HEALTH CARE EDUCATION/TRAINING PROGRAM

## 2017-11-27 PROCEDURE — 37000008 HC ANESTHESIA 1ST 15 MINUTES: Performed by: STUDENT IN AN ORGANIZED HEALTH CARE EDUCATION/TRAINING PROGRAM

## 2017-11-27 PROCEDURE — 44120 REMOVAL OF SMALL INTESTINE: CPT | Mod: ,,, | Performed by: STUDENT IN AN ORGANIZED HEALTH CARE EDUCATION/TRAINING PROGRAM

## 2017-11-27 PROCEDURE — 93306 TTE W/DOPPLER COMPLETE: CPT

## 2017-11-27 PROCEDURE — 44120 REMOVAL OF SMALL INTESTINE: CPT | Mod: 80,,, | Performed by: SURGERY

## 2017-11-27 PROCEDURE — 25000003 PHARM REV CODE 250: Performed by: STUDENT IN AN ORGANIZED HEALTH CARE EDUCATION/TRAINING PROGRAM

## 2017-11-27 PROCEDURE — 25000003 PHARM REV CODE 250: Performed by: HOSPITALIST

## 2017-11-27 PROCEDURE — 93306 TTE W/DOPPLER COMPLETE: CPT | Mod: 26,,, | Performed by: INTERNAL MEDICINE

## 2017-11-27 PROCEDURE — 85025 COMPLETE CBC W/AUTO DIFF WBC: CPT

## 2017-11-27 PROCEDURE — 63600175 PHARM REV CODE 636 W HCPCS: Performed by: PHYSICIAN ASSISTANT

## 2017-11-27 PROCEDURE — 63600175 PHARM REV CODE 636 W HCPCS: Performed by: HOSPITALIST

## 2017-11-27 PROCEDURE — 0DB80ZZ EXCISION OF SMALL INTESTINE, OPEN APPROACH: ICD-10-PCS | Performed by: STUDENT IN AN ORGANIZED HEALTH CARE EDUCATION/TRAINING PROGRAM

## 2017-11-27 PROCEDURE — S0028 INJECTION, FAMOTIDINE, 20 MG: HCPCS | Performed by: PHYSICIAN ASSISTANT

## 2017-11-27 PROCEDURE — 37799 UNLISTED PX VASCULAR SURGERY: CPT

## 2017-11-27 PROCEDURE — 82803 BLOOD GASES ANY COMBINATION: CPT

## 2017-11-27 PROCEDURE — 36000708 HC OR TIME LEV III 1ST 15 MIN: Performed by: STUDENT IN AN ORGANIZED HEALTH CARE EDUCATION/TRAINING PROGRAM

## 2017-11-27 PROCEDURE — 99900035 HC TECH TIME PER 15 MIN (STAT)

## 2017-11-27 PROCEDURE — 36000709 HC OR TIME LEV III EA ADD 15 MIN: Performed by: STUDENT IN AN ORGANIZED HEALTH CARE EDUCATION/TRAINING PROGRAM

## 2017-11-27 PROCEDURE — 25000003 PHARM REV CODE 250: Performed by: PHYSICIAN ASSISTANT

## 2017-11-27 PROCEDURE — 25000003 PHARM REV CODE 250: Performed by: ANESTHESIOLOGY

## 2017-11-27 PROCEDURE — 0D9670Z DRAINAGE OF STOMACH WITH DRAINAGE DEVICE, VIA NATURAL OR ARTIFICIAL OPENING: ICD-10-PCS | Performed by: STUDENT IN AN ORGANIZED HEALTH CARE EDUCATION/TRAINING PROGRAM

## 2017-11-27 PROCEDURE — P9045 ALBUMIN (HUMAN), 5%, 250 ML: HCPCS | Performed by: NURSE ANESTHETIST, CERTIFIED REGISTERED

## 2017-11-27 PROCEDURE — 80076 HEPATIC FUNCTION PANEL: CPT

## 2017-11-27 PROCEDURE — 88307 TISSUE EXAM BY PATHOLOGIST: CPT | Performed by: PATHOLOGY

## 2017-11-27 PROCEDURE — 97530 THERAPEUTIC ACTIVITIES: CPT

## 2017-11-27 PROCEDURE — 02HV33Z INSERTION OF INFUSION DEVICE INTO SUPERIOR VENA CAVA, PERCUTANEOUS APPROACH: ICD-10-PCS | Performed by: STUDENT IN AN ORGANIZED HEALTH CARE EDUCATION/TRAINING PROGRAM

## 2017-11-27 PROCEDURE — 97116 GAIT TRAINING THERAPY: CPT

## 2017-11-27 PROCEDURE — G8979 MOBILITY GOAL STATUS: HCPCS | Mod: CJ

## 2017-11-27 PROCEDURE — 0BH17EZ INSERTION OF ENDOTRACHEAL AIRWAY INTO TRACHEA, VIA NATURAL OR ARTIFICIAL OPENING: ICD-10-PCS | Performed by: STUDENT IN AN ORGANIZED HEALTH CARE EDUCATION/TRAINING PROGRAM

## 2017-11-27 PROCEDURE — A4217 STERILE WATER/SALINE, 500 ML: HCPCS | Performed by: STUDENT IN AN ORGANIZED HEALTH CARE EDUCATION/TRAINING PROGRAM

## 2017-11-27 PROCEDURE — 63600175 PHARM REV CODE 636 W HCPCS: Performed by: STUDENT IN AN ORGANIZED HEALTH CARE EDUCATION/TRAINING PROGRAM

## 2017-11-27 PROCEDURE — 83735 ASSAY OF MAGNESIUM: CPT

## 2017-11-27 PROCEDURE — S0030 INJECTION, METRONIDAZOLE: HCPCS | Performed by: NURSE ANESTHETIST, CERTIFIED REGISTERED

## 2017-11-27 PROCEDURE — 94761 N-INVAS EAR/PLS OXIMETRY MLT: CPT

## 2017-11-27 PROCEDURE — A4216 STERILE WATER/SALINE, 10 ML: HCPCS | Performed by: ANESTHESIOLOGY

## 2017-11-27 PROCEDURE — 80048 BASIC METABOLIC PNL TOTAL CA: CPT

## 2017-11-27 PROCEDURE — 84100 ASSAY OF PHOSPHORUS: CPT

## 2017-11-27 PROCEDURE — 94640 AIRWAY INHALATION TREATMENT: CPT

## 2017-11-27 PROCEDURE — 20000000 HC ICU ROOM

## 2017-11-27 PROCEDURE — 25000003 PHARM REV CODE 250: Performed by: NURSE ANESTHETIST, CERTIFIED REGISTERED

## 2017-11-27 RX ORDER — ALBUMIN HUMAN 50 G/1000ML
SOLUTION INTRAVENOUS CONTINUOUS PRN
Status: DISCONTINUED | OUTPATIENT
Start: 2017-11-27 | End: 2017-11-27

## 2017-11-27 RX ORDER — FENTANYL CITRATE 50 UG/ML
50 INJECTION, SOLUTION INTRAMUSCULAR; INTRAVENOUS
Status: DISPENSED | OUTPATIENT
Start: 2017-11-27 | End: 2017-11-27

## 2017-11-27 RX ORDER — PROPOFOL 10 MG/ML
VIAL (ML) INTRAVENOUS
Status: DISCONTINUED | OUTPATIENT
Start: 2017-11-27 | End: 2017-11-27

## 2017-11-27 RX ORDER — FENTANYL CITRATE 50 UG/ML
50 INJECTION, SOLUTION INTRAMUSCULAR; INTRAVENOUS
Status: DISCONTINUED | OUTPATIENT
Start: 2017-11-27 | End: 2017-11-29

## 2017-11-27 RX ORDER — HYDROMORPHONE HYDROCHLORIDE 2 MG/ML
0.5 INJECTION, SOLUTION INTRAMUSCULAR; INTRAVENOUS; SUBCUTANEOUS EVERY 5 MIN PRN
Status: DISCONTINUED | OUTPATIENT
Start: 2017-11-27 | End: 2017-11-27

## 2017-11-27 RX ORDER — SODIUM CHLORIDE 9 MG/ML
INJECTION, SOLUTION INTRAVENOUS CONTINUOUS PRN
Status: DISCONTINUED | OUTPATIENT
Start: 2017-11-27 | End: 2017-11-27

## 2017-11-27 RX ORDER — CHLORHEXIDINE GLUCONATE ORAL RINSE 1.2 MG/ML
15 SOLUTION DENTAL 2 TIMES DAILY
Status: DISCONTINUED | OUTPATIENT
Start: 2017-11-27 | End: 2017-12-04 | Stop reason: HOSPADM

## 2017-11-27 RX ORDER — ROCURONIUM BROMIDE 10 MG/ML
INJECTION, SOLUTION INTRAVENOUS
Status: DISCONTINUED | OUTPATIENT
Start: 2017-11-27 | End: 2017-11-27

## 2017-11-27 RX ORDER — HYDROMORPHONE HYDROCHLORIDE 2 MG/ML
0.2 INJECTION, SOLUTION INTRAMUSCULAR; INTRAVENOUS; SUBCUTANEOUS EVERY 5 MIN PRN
Status: DISCONTINUED | OUTPATIENT
Start: 2017-11-27 | End: 2017-11-27

## 2017-11-27 RX ORDER — ONDANSETRON 2 MG/ML
INJECTION INTRAMUSCULAR; INTRAVENOUS
Status: DISCONTINUED | OUTPATIENT
Start: 2017-11-27 | End: 2017-11-27

## 2017-11-27 RX ORDER — LIDOCAINE HCL/PF 100 MG/5ML
SYRINGE (ML) INTRAVENOUS
Status: DISCONTINUED | OUTPATIENT
Start: 2017-11-27 | End: 2017-11-27

## 2017-11-27 RX ORDER — SODIUM CHLORIDE 0.9 % (FLUSH) 0.9 %
3 SYRINGE (ML) INJECTION EVERY 8 HOURS
Status: DISCONTINUED | OUTPATIENT
Start: 2017-11-27 | End: 2017-11-30

## 2017-11-27 RX ORDER — BUPIVACAINE HYDROCHLORIDE 2.5 MG/ML
INJECTION, SOLUTION EPIDURAL; INFILTRATION; INTRACAUDAL
Status: DISCONTINUED | OUTPATIENT
Start: 2017-11-27 | End: 2017-11-27 | Stop reason: HOSPADM

## 2017-11-27 RX ORDER — FAMOTIDINE 10 MG/ML
20 INJECTION INTRAVENOUS 2 TIMES DAILY
Status: DISCONTINUED | OUTPATIENT
Start: 2017-11-27 | End: 2017-11-28

## 2017-11-27 RX ORDER — FENTANYL CITRATE 50 UG/ML
INJECTION, SOLUTION INTRAMUSCULAR; INTRAVENOUS
Status: DISCONTINUED | OUTPATIENT
Start: 2017-11-27 | End: 2017-11-27

## 2017-11-27 RX ORDER — LIDOCAINE HYDROCHLORIDE 10 MG/ML
INJECTION, SOLUTION EPIDURAL; INFILTRATION; INTRACAUDAL; PERINEURAL
Status: DISCONTINUED | OUTPATIENT
Start: 2017-11-27 | End: 2017-11-27 | Stop reason: HOSPADM

## 2017-11-27 RX ORDER — ACETAMINOPHEN 10 MG/ML
INJECTION, SOLUTION INTRAVENOUS
Status: DISCONTINUED | OUTPATIENT
Start: 2017-11-27 | End: 2017-11-27

## 2017-11-27 RX ORDER — SUCCINYLCHOLINE CHLORIDE 20 MG/ML
INJECTION INTRAMUSCULAR; INTRAVENOUS
Status: DISCONTINUED | OUTPATIENT
Start: 2017-11-27 | End: 2017-11-27

## 2017-11-27 RX ORDER — PHENYLEPHRINE HYDROCHLORIDE 10 MG/ML
INJECTION INTRAVENOUS
Status: DISCONTINUED | OUTPATIENT
Start: 2017-11-27 | End: 2017-11-27

## 2017-11-27 RX ORDER — ETOMIDATE 2 MG/ML
INJECTION INTRAVENOUS
Status: DISCONTINUED | OUTPATIENT
Start: 2017-11-27 | End: 2017-11-27

## 2017-11-27 RX ORDER — IPRATROPIUM BROMIDE AND ALBUTEROL SULFATE 2.5; .5 MG/3ML; MG/3ML
3 SOLUTION RESPIRATORY (INHALATION) EVERY 6 HOURS
Status: DISCONTINUED | OUTPATIENT
Start: 2017-11-27 | End: 2017-12-04 | Stop reason: HOSPADM

## 2017-11-27 RX ORDER — ONDANSETRON 2 MG/ML
4 INJECTION INTRAMUSCULAR; INTRAVENOUS DAILY PRN
Status: DISCONTINUED | OUTPATIENT
Start: 2017-11-27 | End: 2017-11-30

## 2017-11-27 RX ORDER — METRONIDAZOLE 500 MG/100ML
INJECTION, SOLUTION INTRAVENOUS
Status: DISCONTINUED | OUTPATIENT
Start: 2017-11-27 | End: 2017-11-27

## 2017-11-27 RX ADMIN — METHOCARBAMOL 500 MG: 500 TABLET ORAL at 05:11

## 2017-11-27 RX ADMIN — ALBUMIN HUMAN: 0.05 INJECTION, SOLUTION INTRAVENOUS at 03:11

## 2017-11-27 RX ADMIN — DEXTROSE, SODIUM CHLORIDE, SODIUM LACTATE, POTASSIUM CHLORIDE, AND CALCIUM CHLORIDE: 5; .6; .31; .03; .02 INJECTION, SOLUTION INTRAVENOUS at 07:11

## 2017-11-27 RX ADMIN — ROCURONIUM BROMIDE 20 MG: 10 INJECTION, SOLUTION INTRAVENOUS at 04:11

## 2017-11-27 RX ADMIN — METHOCARBAMOL 500 MG: 500 TABLET ORAL at 12:11

## 2017-11-27 RX ADMIN — LIDOCAINE HYDROCHLORIDE 50 MG: 20 INJECTION, SOLUTION INTRAVENOUS at 02:11

## 2017-11-27 RX ADMIN — IPRATROPIUM BROMIDE AND ALBUTEROL SULFATE 3 ML: .5; 3 SOLUTION RESPIRATORY (INHALATION) at 07:11

## 2017-11-27 RX ADMIN — ENALAPRILAT 2.5 MG: 1.25 INJECTION, SOLUTION INTRAVENOUS at 05:11

## 2017-11-27 RX ADMIN — CALCIUM GLUCONATE: 94 INJECTION, SOLUTION INTRAVENOUS at 11:11

## 2017-11-27 RX ADMIN — DEXTROSE 2 G: 50 INJECTION, SOLUTION INTRAVENOUS at 02:11

## 2017-11-27 RX ADMIN — ONDANSETRON 4 MG: 2 INJECTION, SOLUTION INTRAMUSCULAR; INTRAVENOUS at 03:11

## 2017-11-27 RX ADMIN — SUCCINYLCHOLINE CHLORIDE 120 MG: 20 INJECTION, SOLUTION INTRAMUSCULAR; INTRAVENOUS at 02:11

## 2017-11-27 RX ADMIN — CHLORHEXIDINE GLUCONATE 15 ML: 1.2 RINSE ORAL at 09:11

## 2017-11-27 RX ADMIN — LIDOCAINE HYDROCHLORIDE 20 ML: 10 INJECTION, SOLUTION EPIDURAL; INFILTRATION; INTRACAUDAL; PERINEURAL at 04:11

## 2017-11-27 RX ADMIN — ONDANSETRON 8 MG: 8 TABLET, ORALLY DISINTEGRATING ORAL at 07:11

## 2017-11-27 RX ADMIN — RAMELTEON 8 MG: 8 TABLET, FILM COATED ORAL at 09:11

## 2017-11-27 RX ADMIN — VASOPRESSIN 1 UNITS: 20 INJECTION, SOLUTION INTRAMUSCULAR; SUBCUTANEOUS at 03:11

## 2017-11-27 RX ADMIN — ENALAPRILAT 2.5 MG: 1.25 INJECTION, SOLUTION INTRAVENOUS at 12:11

## 2017-11-27 RX ADMIN — FENTANYL CITRATE 100 MCG: 50 INJECTION, SOLUTION INTRAMUSCULAR; INTRAVENOUS at 02:11

## 2017-11-27 RX ADMIN — HYDROCODONE BITARTRATE AND ACETAMINOPHEN 1 TABLET: 5; 325 TABLET ORAL at 05:11

## 2017-11-27 RX ADMIN — ROCURONIUM BROMIDE 15 MG: 10 INJECTION, SOLUTION INTRAVENOUS at 03:11

## 2017-11-27 RX ADMIN — METRONIDAZOLE 500 MG: 500 SOLUTION INTRAVENOUS at 02:11

## 2017-11-27 RX ADMIN — ALBUMIN HUMAN: 0.05 INJECTION, SOLUTION INTRAVENOUS at 02:11

## 2017-11-27 RX ADMIN — HYDRALAZINE HYDROCHLORIDE 10 MG: 20 INJECTION INTRAMUSCULAR; INTRAVENOUS at 06:11

## 2017-11-27 RX ADMIN — ETOMIDATE 15 MG: 2 INJECTION, SOLUTION INTRAVENOUS at 02:11

## 2017-11-27 RX ADMIN — ENOXAPARIN SODIUM 40 MG: 100 INJECTION SUBCUTANEOUS at 09:11

## 2017-11-27 RX ADMIN — FAMOTIDINE 20 MG: 10 INJECTION, SOLUTION INTRAVENOUS at 12:11

## 2017-11-27 RX ADMIN — PHENYLEPHRINE HYDROCHLORIDE 200 MCG: 10 INJECTION INTRAVENOUS at 02:11

## 2017-11-27 RX ADMIN — METHOCARBAMOL 500 MG: 500 TABLET ORAL at 11:11

## 2017-11-27 RX ADMIN — FAMOTIDINE 20 MG: 10 INJECTION, SOLUTION INTRAVENOUS at 08:11

## 2017-11-27 RX ADMIN — FENTANYL CITRATE 50 MCG: 50 INJECTION, SOLUTION INTRAMUSCULAR; INTRAVENOUS at 07:11

## 2017-11-27 RX ADMIN — CALCIUM CHLORIDE 1 G: 100 INJECTION, SOLUTION INTRAVENOUS at 03:11

## 2017-11-27 RX ADMIN — PROPOFOL 50 MG: 10 INJECTION, EMULSION INTRAVENOUS at 02:11

## 2017-11-27 RX ADMIN — SODIUM CHLORIDE, PRESERVATIVE FREE 3 ML: 5 INJECTION INTRAVENOUS at 09:11

## 2017-11-27 RX ADMIN — FENTANYL CITRATE 150 MCG: 50 INJECTION, SOLUTION INTRAMUSCULAR; INTRAVENOUS at 02:11

## 2017-11-27 RX ADMIN — ACETAMINOPHEN 1000 MG: 10 INJECTION, SOLUTION INTRAVENOUS at 03:11

## 2017-11-27 RX ADMIN — SODIUM CHLORIDE: 0.9 INJECTION, SOLUTION INTRAVENOUS at 02:11

## 2017-11-27 RX ADMIN — MEMANTINE HYDROCHLORIDE 10 MG: 5 TABLET ORAL at 09:11

## 2017-11-27 RX ADMIN — ROCURONIUM BROMIDE 30 MG: 10 INJECTION, SOLUTION INTRAVENOUS at 02:11

## 2017-11-27 RX ADMIN — DONEPEZIL HYDROCHLORIDE 10 MG: 5 TABLET, FILM COATED ORAL at 09:11

## 2017-11-27 RX ADMIN — ROCURONIUM BROMIDE 5 MG: 10 INJECTION, SOLUTION INTRAVENOUS at 02:11

## 2017-11-27 RX ADMIN — BUPIVACAINE HYDROCHLORIDE 20 ML: 2.5 INJECTION, SOLUTION EPIDURAL; INFILTRATION; INTRACAUDAL; PERINEURAL at 04:11

## 2017-11-27 RX ADMIN — FENTANYL CITRATE 50 MCG: 50 INJECTION, SOLUTION INTRAMUSCULAR; INTRAVENOUS at 08:11

## 2017-11-27 NOTE — TRANSFER OF CARE
"Anesthesia Transfer of Care Note    Patient: Lesa Holder    Procedure(s) Performed: Procedure(s) (LRB):  LAPAROTOMY-EXPLORATORY (N/A)  RESECTION-SMALL BOWEL    Patient location: ICU    Anesthesia Type: general    Transport from OR: Transported from OR intubated on 100% O2 by AMBU with adequate controlled ventilation. Upon arrival to PACU/ICU, patient attached to ventilator and auscultated to confirm bilateral breath sounds and adequate TV. Continuous ECG monitoring in transport. Continuous SpO2 monitoring in transport. Continuos invasive BP monitoring in transport    Post pain: adequate analgesia    Post assessment: no apparent anesthetic complications    Post vital signs: stable    Level of consciousness: sedated    Nausea/Vomiting: no nausea/vomiting    Complications: none    Transfer of care protocol was followed      Last vitals:   Visit Vitals  /62 (Patient Position: Lying)   Pulse 101   Temp 36.4 °C (97.6 °F) (Oral)   Resp 18   Ht 5' 3" (1.6 m)   Wt 76.9 kg (169 lb 8.5 oz)   SpO2 96%   Breastfeeding? No   BMI 30.03 kg/m²     "

## 2017-11-27 NOTE — PT/OT/SLP PROGRESS
Physical Therapy  Treatment    Lesa Holder   MRN: [id number]   Admitting Diagnosis: Intestinal obstruction    PT Received On: 11/27/17  PT Start Time: 1124     PT Stop Time: 1148    PT Total Time (min): 24 min       Billable Minutes:  Gait [id number] and Therapeutic Activity 11    Treatment Type: Treatment  PT/PTA: PTA     PTA Visit Number: 1       General Precautions: Standard, fall, NPO  Orthopedic Precautions: N/A   Braces: N/A    Do you have any cultural, spiritual, Jew conflicts, given your current situation?: None verbalized to PT    Subjective:  Communicated with nsg prior to session.      Pain/Comfort  Pain Rating 1:  (no c/o's)    Objective:   Patient found with: NG tube, peripheral IV    Functional Mobility:  Bed Mobility:   Supine to Sit: Minimum Assistance  Sit to Supine: Moderate Assistance (le's)    Transfers:  Sit <> Stand Assistance: Contact Guard Assistance, Minimum Assistance  Sit <> Stand Assistive Device: Rolling Walker  Toilet Transfer Technique: Stand Pivot  Toilet Transfer Assistance: Minimum Assistance  Toilet Transfer Assistive Device: Rolling Walker    Gait:   Gait Distance: ~35' X 1 with IV in tow  Assistance 1: Minimum assistance  Gait Assistive Device: Rolling walker  Gait Pattern: reciprocal  Gait Deviation(s): decreased yoanna, decreased velocity of limb motion, decreased step length, decreased stride length    Stairs:      Balance:   Static Sit: FAIR+: Able to take MINIMAL challenges from all directions  Dynamic Sit: FAIR+: Maintains balance through MINIMAL excursions of active trunk motion  Static Stand: POOR+: Needs MINIMAL assist to maintain  Dynamic stand: POOR: N/A     Therapeutic Activities and Exercises: pt with increased spitting up and used b/s commode during rx,rest periods PRN       AM-PAC 6 CLICK MOBILITY  How much help from another person does this patient currently need?   1 = Unable, Total/Dependent Assistance  2 = A lot, Maximum/Moderate Assistance  3 = A  little, Minimum/Contact Guard/Supervision  4 = None, Modified [place]/Independent    Turning over in bed (including adjusting bedclothes, sheets and blankets)?: 3  Sitting down on and standing up from a chair with arms (e.g., wheelchair, bedside commode, etc.): 3  Moving from lying on back to sitting on the side of the bed?: 3  Moving to and from a bed to a chair (including a wheelchair)?: 3  Need to walk in hospital room?: 3  Climbing 3-5 steps with a railing?: 2  Total Score: 17    AM-PAC Raw Score CMS G-Code Modifier Level of Impairment Assistance   6 % Total / Unable   7 - 9 CM 80 - 100% Maximal Assist   10 - 14 CL 60 - 80% Moderate Assist   15 - 19 CK 40 - 60% Moderate Assist   20 - 22 CJ 20 - 40% Minimal Assist   23 CI 1-20% SBA / CGA   24 CH 0% Independent/ Mod I     Patient left supine with all lines intact, call button in reach, bed alarm on, nsg notified and  present.    Assessment: pt requires increased time with activity and assistance with all activity,possible SNF  Lesa Holder is a 89 y.o. female with a medical diagnosis of Intestinal obstruction and presents with .    Rehab identified problem list/impairments: Rehab identified problem list/impairments: weakness, impaired endurance, impaired functional mobilty, gait instability, impaired balance, decreased lower extremity function, decreased ROM, impaired coordination    Rehab potential is good.    Activity tolerance: Fair    Discharge recommendations: Discharge Facility/Level Of Care Needs:  (TBD)     Barriers to discharge:      Equipment recommendations: Equipment Needed After Discharge:  (TBD )     GOALS: see general POC   Physical Therapy Goals        Problem: Physical Therapy Goal    Goal Priority Disciplines Outcome Goal Variances Interventions   Physical Therapy Goal     PT/OT, PT Ongoing (interventions implemented as appropriate)     Description:  Goals to be met by: 21/26/17     Patient will increase functional  independence with mobility by performin. Supine <> sit with [place]  2. Sit <> stand transfer with Modified [place] with/without RW  3. Gait  x 150 feet with Modified [place] using Rolling Walker.                       PLAN:    Patient to be seen 6 x/week  to address the above listed problems via gait training, therapeutic activities, therapeutic exercises  Plan of Care expires: 17  Plan of Care reviewed with: patient, spouse         Alfredo López, PTA  2017

## 2017-11-27 NOTE — ASSESSMENT & PLAN NOTE
-183 with good control since initiation of IV ACEI.  Continue enalaprit 2.5 mg every 6 hours and PRN hydralazine 10 mg every 8 hours. Monitor.

## 2017-11-27 NOTE — PROGRESS NOTES
.Pharmacy New Medication Education    Patient accepted medication education.    Pharmacy educated patient on name and purpose of medications and possible side effects, using the teach-back method.     vasotec    Learners of pharmacy medication education included:  Patient    Patient +/- learner response:  Verbalized Understanding, Teachback

## 2017-11-27 NOTE — PLAN OF CARE
Problem: Patient Care Overview  Goal: Plan of Care Review  Outcome: Ongoing (interventions implemented as appropriate)  Remains free from falls, bed alarm in use. IV fluids in progress. PPN Nutrition in progress. Encouraged frequent weight shifting in bed no pressure ulcers noted. NG tube to wall suction, brown drainage out in canister. Medicated for nausea and pain and good results verbalized by patient.Remains NPO, possible surgery today patient informed also a 2D Echo W/ CF Doppler today.

## 2017-11-27 NOTE — ANESTHESIA PREPROCEDURE EVALUATION
11/27/2017  Lesa Holder is a 89 y.o., female h/o GERD, HTN and dementia has probable SBO    Anesthesia Evaluation    I have reviewed the Patient Summary Reports.    I have reviewed the Nursing Notes.   I have reviewed the Medications.     Review of Systems  Anesthesia Hx:  No problems with previous Anesthesia Denies Hx of Anesthetic complications  History of prior surgery of interest to airway management or planning: Denies Family Hx of Anesthesia complications.   Denies Personal Hx of Anesthesia complications.   Social:  Non-Smoker, No Alcohol Use    Cardiovascular:   Hypertension    Hepatic/GI:   GERD    Musculoskeletal:   Arthritis  Arthritis worse in legs       Physical Exam  General:  Well nourished    Airway/Jaw/Neck:  Airway Findings: Mouth Opening: Normal General Airway Assessment: Adult  Mallampati: II  Improves to II with phonation.  TM Distance: Normal, at least 6 cm  Jaw/Neck Findings:      Dental:  Dental Findings: Lower Dentures, Upper Dentures   Chest/Lungs:  Chest/Lungs Findings: Clear to auscultation, Normal Respiratory Rate     Heart/Vascular:  Heart Findings: Rate: Normal  Rhythm: Regular Rhythm  Sounds: Normal        Mental Status:  Mental Status Findings:  Cooperative         Anesthesia Plan  Type of Anesthesia, risks & benefits discussed:  Anesthesia Type:  general  Patient's Preference:   Intra-op Monitoring Plan:   Intra-op Monitoring Plan Comments:   Post Op Pain Control Plan:   Post Op Pain Control Plan Comments:   Induction:   IV  Beta Blocker:  Patient is not currently on a Beta-Blocker (No further documentation required).       Informed Consent: Patient representative understands risks and agrees with Anesthesia plan.  Questions answered. Anesthesia consent signed with patient representative.  ASA Score: 3     Day of Surgery Review of History & Physical: I have interviewed and  examined the patient. I have reviewed the patient's H&P dated:            Ready For Surgery From Anesthesia Perspective.   NGT & PICC loine present    Recent Labs  Lab 11/27/17  0520   WBC 9.22   RBC 4.03   HGB 11.7*   HCT 35.3*      MCV 88   MCH 29.0   MCHC 33.1       Chemistry        Component Value Date/Time     (L) 11/27/2017 0520    K 3.6 11/27/2017 0520    CL 99 11/27/2017 0520    CO2 25 11/27/2017 0520    BUN 26 (H) 11/27/2017 0520    CREATININE 0.7 11/27/2017 0520     (H) 11/27/2017 0520        Component Value Date/Time    CALCIUM 8.0 (L) 11/27/2017 0520    ALKPHOS 66 11/27/2017 0520    AST 25 11/27/2017 0520    ALT 16 11/27/2017 0520    BILITOT 0.5 11/27/2017 0520    ESTGFRAFRICA >60 11/27/2017 0520    EGFRNONAA >60 11/27/2017 0520       Normal sinus rhythm  Possible Left atrial enlargement  LVH with repolarization abnormality  Prolonged QT  Abnormal ECG  When compared with ECG of 24-NOV-2017 08:29,  T wave inversion no longer evident in Inferior leads  T wave inversion no longer evident in Anterior leads  T wave inversion now evident in Lateral leads    Comparison:  Chest radiograph 11/21/17    Findings: Interval right-sided PICC line placement with tip overlying SVC.  Enteric tube coursing into the abdomen.    Mediastinal structures are midline.  Cardiomediastinal silhouette is within normal limits for size and configuration.  Lungs are underinflated possible small volume pleural fluid and basilar atelectasis.    Bones show degenerative changes

## 2017-11-27 NOTE — SUBJECTIVE & OBJECTIVE
"Interval History: Pt c/o "acid" in her throat.  Denies CP, SOB, abdominal pain.     Review of Systems   Respiratory: Negative for shortness of breath.    Cardiovascular: Negative for chest pain.   Gastrointestinal:        + acid reflux      Objective:     Vital Signs (Most Recent):  Temp: 97.7 °F (36.5 °C) (17 08)  Pulse: 102 (17)  Resp: 18 (17)  BP: 126/65 (17)  SpO2: 97 % (17 0426) Vital Signs (24h Range):  Temp:  [96.8 °F (36 °C)-98.7 °F (37.1 °C)] 97.7 °F (36.5 °C)  Pulse:  [] 102  Resp:  [18-20] 18  SpO2:  [95 %-97 %] 97 %  BP: (126-183)/(65-87) 126/65     Weight: 76.9 kg (169 lb 8.5 oz)  Body mass index is 30.03 kg/m².    Intake/Output Summary (Last 24 hours) at 17 1033  Last data filed at 17 0830   Gross per 24 hour   Intake           3017.5 ml   Output             1681 ml   Net           1336.5 ml      Physical Exam   Constitutional: She is oriented to person, place, and time. No distress.   Cardiovascular: Normal rate and regular rhythm.    Pulmonary/Chest: Effort normal and breath sounds normal. No respiratory distress.   Abdominal: There is no tenderness.   Absent bowel sounds   Neurological: She is alert and oriented to person, place, and time.   Psychiatric: She has a normal mood and affect. Thought content normal.   Nursing note and vitals reviewed.      Significant Labs:   CBC:   Recent Labs  Lab 17  0520   WBC 9.22   HGB 11.7*   HCT 35.3*        CMP:   Recent Labs  Lab 17  0722 17  0520   *  134* 133*   K 3.6  3.6 3.6   CL 99  99 99   CO2 26  26 25   *  182* 195*   BUN 30*  30* 26*   CREATININE 0.8  0.8 0.7   CALCIUM 8.4*  8.4* 8.0*   PROT  --  5.7*   ALBUMIN  --  2.2*   BILITOT  --  0.5   ALKPHOS  --  66   AST  --  25   ALT  --  16   ANIONGAP 9  9 9   EGFRNONAA >60  >60 >60       Significant ImaginD Echo 2017  Report pending   "

## 2017-11-27 NOTE — PROGRESS NOTES
Patient personal belongings brought to ICU  255. ICU RN informed of patients ring and dentures being in patient belongings bag.

## 2017-11-27 NOTE — PT/OT/SLP PROGRESS
Occupational Therapy  D/c OT orders     Lesa Holder  MRN: [id number]    Therapy to d/c orders at this time 2/2 pt to OR for ex-lap procedure. Will require new orders post surgery when pt medically stable for participation with activity clarifications/precautions.    Malinda Plummer, OT  11/27/2017

## 2017-11-27 NOTE — PLAN OF CARE
Problem: Patient Care Overview  Goal: Plan of Care Review  Outcome: Ongoing (interventions implemented as appropriate)  Pt on RA with sats of 96%. Will continue to monitor.

## 2017-11-27 NOTE — ASSESSMENT & PLAN NOTE
Likely 2/2 demand ischemia from elevated BP.  Pt denies CP currently.  EKG shows no ST elevation and T wave inversions have resolved as per EKG on 11/26.  Troponin 0.05>>0.04>>0.039>>0.033.  Monitor on telemetry. 2D Echo done, report pending.

## 2017-11-27 NOTE — SUBJECTIVE & OBJECTIVE
Interval History:   Still with some nausea  Mild pain, discomfort, unchanged  No events  On TPN  NG with 930 greenish out  +BM    Medications:  Continuous Infusions:   Amino acid 5% - dextrose 20% (CLINIMIX-E) solution with additives (1L provides 50 gm AA, 200 gm CHO (680 kcal/L dextrose), Na 35, K 30, Mg 5, Ca 4.5, Acetate 80, Cl 39, Phos 15) 75 mL/hr at 11/26/17 1825    dextrose 5% lactated ringers 50 mL/hr at 11/25/17 1134     Scheduled Meds:   aspirin  81 mg Oral Daily    donepezil  10 mg Oral BID    enalaprilat  2.5 mg Intravenous Q6H    enoxaparin  40 mg Subcutaneous Q24H    escitalopram oxalate  20 mg Oral Daily    [START ON 12/1/2017] fat emulsion 20%  250 mL Intravenous Every Fri    memantine  10 mg Oral BID    methocarbamol  500 mg Oral QID    pantoprazole  40 mg Oral Daily     PRN Meds:acetaminophen, diphenhydrAMINE, hydrALAZINE, hydrocodone-acetaminophen 5-325mg, influenza, ondansetron, ramelteon, sodium chloride 0.9%     Review of patient's allergies indicates:  No Known Allergies  Objective:     Vital Signs (Most Recent):  Temp: 97.7 °F (36.5 °C) (11/27/17 0806)  Pulse: 102 (11/27/17 0806)  Resp: 18 (11/27/17 0806)  BP: 126/65 (11/27/17 0806)  SpO2: 97 % (11/27/17 0426) Vital Signs (24h Range):  Temp:  [96.8 °F (36 °C)-98.7 °F (37.1 °C)] 97.7 °F (36.5 °C)  Pulse:  [] 102  Resp:  [18-20] 18  SpO2:  [95 %-97 %] 97 %  BP: (126-183)/(65-87) 126/65     Weight: 76.9 kg (169 lb 8.5 oz)  Body mass index is 30.03 kg/m².    Intake/Output - Last 3 Shifts       11/25 0700 - 11/26 0659 11/26 0700 - 11/27 0659 11/27 0700 - 11/28 0659    P.O. 0 0     I.V. (mL/kg) 1128.3 (14.7) 1537.5 (20)     NG/GT  30     IV Piggyback  600     TPN 1280 850     Total Intake(mL/kg) 2408.3 (31.3) 3017.5 (39.2)     Urine (mL/kg/hr) 400 (0.2) 750 (0.4) 150 (0.6)    Drains 850 (0.5) 930 (0.5)     Stool 0 (0) 1 (0)     Total Output 1250 1681 150    Net +1158.3 +1336.5 -150           Urine Occurrence 1 x 300 x     Stool  Occurrence 0 x 3 x           Physical Exam  Alert, NAD  RRR  Ab soft, mild distention  NG in place      Significant Labs:  CBC:   Recent Labs  Lab 11/27/17  0520   WBC 9.22   RBC 4.03   HGB 11.7*   HCT 35.3*      MCV 88   MCH 29.0   MCHC 33.1     BMP:   Recent Labs  Lab 11/27/17  0520   *   *   K 3.6   CL 99   CO2 25   BUN 26*   CREATININE 0.7   CALCIUM 8.0*   MG 1.9       Significant Diagnostics:  I have reviewed all pertinent imaging results/findings within the past 24 hours.

## 2017-11-27 NOTE — PT/OT/SLP PROGRESS
Occupational Therapy  Evaluation Attempt     Lesa Holder  MRN: [id number]    Patient not seen today secondary to CRISTELA in testing  .     1130: pt with PT.     1325: nsg care at bedside.    1500: pt to OR for ex-lap    Will follow-up as available.    Malinda Plummer, OT  11/27/2017

## 2017-11-27 NOTE — ASSESSMENT & PLAN NOTE
Surgery planned for today.   Pre-operative Evaluation    Ms. Lesa Holder is a 89 y.o. female who will be undergoing an abdominal surgery which is a Moderate Risk cardiac procedure, and has the following:  · An exercise tolerance which is greater than four METs.  · Clinical factors that are Moderate Risk.  · No current signs/symptoms of unstable coronary syndrome, decompensated heart failure, significant arrhythmias, nor severe valvular disease.     The patient is currenlty medically optimized.  Proceed with surgery.

## 2017-11-27 NOTE — PLAN OF CARE
Problem: Physical Therapy Goal  Goal: Physical Therapy Goal  Goals to be met by:      Patient will increase functional independence with mobility by performin. Supine <> sit with [place]  2. Sit <> stand transfer with Modified [place] with/without RW  3. Gait  x 150 feet with Modified [place] using Rolling Walker.      Outcome: Ongoing (interventions implemented as appropriate)  Goals ongoing

## 2017-11-27 NOTE — OP NOTE
[facility]  Surgery Department  Operative Note    SUMMARY     Date of Procedure: 11/27/2017     Procedure: Procedure(s) (LRB):  LAPAROTOMY-EXPLORATORY (N/A)  RESECTION-SMALL BOWEL     Surgeon(s) and Role:     * Barney Gardner MD - Primary    Assisting Surgeon: Mica Shen MD    Pre-Operative Diagnosis: Bowel obstruction [K56.609]    Post-Operative Diagnosis: Post-Op Diagnosis Codes:     Adhesive bowel obstruction    Anesthesia: General    Technical Procedures Used: Midline laparotomy incision was made. Cautery was used to open the abdominal wall fascia. The abdomen was entered. There was some serous fluid but no succus or purulent material noted. The omentum was adhered to the pelvis and was carefully taken down using cautery. Eventually the omentum was freed up enough to mobilize the small bowel. The small bowel was then run from proximal to distal. There was a loop of intestine that was densely adhered to the pelvis. The adhesions were lysed using cautery but there was a small segment of dusky appearing bowel. The remaining distal portion of the intestine was decompressed and freed all the way to the cecum. The stomach and colon were normal.  A BHUPENDRA stapler was fired across the small bowel at an area proximal to the area of concern. An stapled side to side functional end to end anastomosis was created. Approximately 20cm of small bowel was resected. The mesenteric defect was closed using 2-0 vicryl. The abdomen was irrigated using normal saline. The fascia was then closed using 0 loop PDS. Skin was closed using staples.     Description of the Findings of the Procedure: Dusky loop of small bowel with significant adhesions. No perforation noted. Resected 20cm of ileum.     Significant Surgical Tasks Conducted by the Assistant(s), if Applicable: Lysing adhesions    Complications: No    Estimated Blood Loss (EBL): * No values recorded between 11/27/2017  2:51 PM and 11/27/2017  4:18 PM *            Implants: * No implants in log *    Specimens:   Specimen (12h ago through future)    Start     Ordered    11/27/17 1604  Specimen to Pathology - Surgery  Once     Comments:  1) small bowel      11/27/17 1603                  Condition: Stable    Disposition: PACU - hemodynamically stable.    Attestation: I was present and scrubbed for the entire procedure.

## 2017-11-27 NOTE — PROGRESS NOTES
[facility]  General Surgery  Progress Note    Subjective:     History of Present Illness:  No notes on file    Post-Op Info:  Procedure(s) (LRB):  LAPAROTOMY-EXPLORATORY (N/A)         Interval History:   Still with some nausea  Mild pain, discomfort, unchanged  No events  On TPN  NG with 930 greenish out  +BM    Medications:  Continuous Infusions:   Amino acid 5% - dextrose 20% (CLINIMIX-E) solution with additives (1L provides 50 gm AA, 200 gm CHO (680 kcal/L dextrose), Na 35, K 30, Mg 5, Ca 4.5, Acetate 80, Cl 39, Phos 15) 75 mL/hr at 11/26/17 1825    dextrose 5% lactated ringers 50 mL/hr at 11/25/17 1134     Scheduled Meds:   aspirin  81 mg Oral Daily    donepezil  10 mg Oral BID    enalaprilat  2.5 mg Intravenous Q6H    enoxaparin  40 mg Subcutaneous Q24H    escitalopram oxalate  20 mg Oral Daily    [START ON 12/1/2017] fat emulsion 20%  250 mL Intravenous Every Fri    memantine  10 mg Oral BID    methocarbamol  500 mg Oral QID    pantoprazole  40 mg Oral Daily     PRN Meds:acetaminophen, diphenhydrAMINE, hydrALAZINE, hydrocodone-acetaminophen 5-325mg, influenza, ondansetron, ramelteon, sodium chloride 0.9%     Review of patient's allergies indicates:  No Known Allergies  Objective:     Vital Signs (Most Recent):  Temp: 97.7 °F (36.5 °C) (11/27/17 0806)  Pulse: 102 (11/27/17 0806)  Resp: 18 (11/27/17 0806)  BP: 126/65 (11/27/17 0806)  SpO2: 97 % (11/27/17 0426) Vital Signs (24h Range):  Temp:  [96.8 °F (36 °C)-98.7 °F (37.1 °C)] 97.7 °F (36.5 °C)  Pulse:  [] 102  Resp:  [18-20] 18  SpO2:  [95 %-97 %] 97 %  BP: (126-183)/(65-87) 126/65     Weight: 76.9 kg (169 lb 8.5 oz)  Body mass index is 30.03 kg/m².    Intake/Output - Last 3 Shifts       11/25 0700 - 11/26 0659 11/26 0700 - 11/27 0659 11/27 0700 - 11/28 0659    P.O. 0 0     I.V. (mL/kg) 1128.3 (14.7) 1537.5 (20)     NG/GT  30     IV Piggyback  600     TPN 1280 850     Total Intake(mL/kg) 2408.3 (31.3) 3017.5 (39.2)     Urine  (mL/kg/hr) 400 (0.2) 750 (0.4) 150 (0.6)    Drains 850 (0.5) 930 (0.5)     Stool 0 (0) 1 (0)     Total Output 1250 1681 150    Net +1158.3 +1336.5 -150           Urine Occurrence 1 x 300 x     Stool Occurrence 0 x 3 x           Physical Exam  Alert, NAD  RRR  Ab soft, mild distention  NG in place      Significant Labs:  CBC:   Recent Labs  Lab 11/27/17  0520   WBC 9.22   RBC 4.03   HGB 11.7*   HCT 35.3*      MCV 88   MCH 29.0   MCHC 33.1     BMP:   Recent Labs  Lab 11/27/17  0520   *   *   K 3.6   CL 99   CO2 25   BUN 26*   CREATININE 0.7   CALCIUM 8.0*   MG 1.9       Significant Diagnostics:  I have reviewed all pertinent imaging results/findings within the past 24 hours.    Assessment/Plan:     * Intestinal obstruction    2D echocardiogram today  Continue NPO, NG, TPN  Ex lap today  Consents signed by               Barney Gardner MD  General Surgery  [facility]

## 2017-11-27 NOTE — PROGRESS NOTES
"[facility] Hospital Medicine  Progress Note    Patient Name: Lesa Holder  MRN: [id number]  Patient Class: IP- Inpatient   Admission Date: 2017  Length of Stay: 7 days  Attending Physician: Barney Gardner MD  Primary Care Provider: Miriam Saunders MD        Subjective:     Principal Problem:Intestinal obstruction    HPI:  89 female with hypertension and mild cognitive impairment admitted to General Surgery for N/V with SBO.  Hospital Medicine consulted for elevated BP and troponin.      Pt seen in her room with her , Ty, present. He states that her SBP usually runs 130s-150s.  Pt denies current chest pain or abdominal pain. Denies h/o heart attack or heart surgery.  Denies use of alcohol, tobacco or illicit drugs.     Pt's mother  in her 70s of unknown cause; father  in MVA.  Pt had 1 brother who  of unknown cause.  Pt had 4 children, 1 is still living.  Pt and  live together by themselves.     Hospital Course:  SBP improved since initiation of IV ACEI. Troponin trended down to 0.033. No chest pain. Pt agrees to surgery for SBO which is scheduled for this morning.  She is at moderate risk because of age and overall function status which is moderate because of her age.  She has no concerning cardiac risk factors to delay this surgery.      Interval History: Pt c/o "acid" in her throat.  Denies CP, SOB, abdominal pain.     Review of Systems   Respiratory: Negative for shortness of breath.    Cardiovascular: Negative for chest pain.   Gastrointestinal:        + acid reflux      Objective:     Vital Signs (Most Recent):  Temp: 97.7 °F (36.5 °C) (17 0806)  Pulse: 102 (17 0806)  Resp: 18 (17 08)  BP: 126/65 (17 0806)  SpO2: 97 % (17 0426) Vital Signs (24h Range):  Temp:  [96.8 °F (36 °C)-98.7 °F (37.1 °C)] 97.7 °F (36.5 °C)  Pulse:  [] 102  Resp:  [18-20] 18  SpO2:  [95 %-97 %] 97 %  BP: (126-183)/(65-87) 126/65 "     Weight: 76.9 kg (169 lb 8.5 oz)  Body mass index is 30.03 kg/m².    Intake/Output Summary (Last 24 hours) at 17 1033  Last data filed at 17 0830   Gross per 24 hour   Intake           3017.5 ml   Output             1681 ml   Net           1336.5 ml      Physical Exam   Constitutional: She is oriented to person, place, and time. No distress.   Cardiovascular: Normal rate and regular rhythm.    Pulmonary/Chest: Effort normal and breath sounds normal. No respiratory distress.   Abdominal: There is no tenderness.   Absent bowel sounds   Neurological: She is alert and oriented to person, place, and time.   Psychiatric: She has a normal mood and affect. Thought content normal.   Nursing note and vitals reviewed.      Significant Labs:   CBC:   Recent Labs  Lab 17  0520   WBC 9.22   HGB 11.7*   HCT 35.3*        CMP:   Recent Labs  Lab 17  0722 17  0520   *  134* 133*   K 3.6  3.6 3.6   CL 99  99 99   CO2 26  26 25   *  182* 195*   BUN 30*  30* 26*   CREATININE 0.8  0.8 0.7   CALCIUM 8.4*  8.4* 8.0*   PROT  --  5.7*   ALBUMIN  --  2.2*   BILITOT  --  0.5   ALKPHOS  --  66   AST  --  25   ALT  --  16   ANIONGAP 9  9 9   EGFRNONAA >60  >60 >60       Significant ImaginD Echo 2017  Report pending     Assessment/Plan:      * Intestinal obstruction    Surgery planned for today.   Pre-operative Evaluation    Ms. Lesa Holder is a 89 y.o. female who will be undergoing an abdominal surgery which is a Moderate Risk cardiac procedure, and has the following:  · An exercise tolerance which is greater than four METs.  · Clinical factors that are Moderate Risk.  · No current signs/symptoms of unstable coronary syndrome, decompensated heart failure, significant arrhythmias, nor severe valvular disease.     The patient is currenlty medically optimized.  Proceed with surgery.               Essential hypertension    -183 with good control since initiation  of IV ACEI.  Continue enalaprit 2.5 mg every 6 hours and PRN hydralazine 10 mg every 8 hours. Monitor.           Elevated troponin    Likely 2/2 demand ischemia from elevated BP.  Pt denies CP currently.  EKG shows no ST elevation and T wave inversions have resolved as per EKG on 11/26.  Troponin 0.05>>0.04>>0.039>>0.033.  Monitor on telemetry. 2D Echo done, report pending.           Dementia without behavioral disturbance    Pt alert and oriented.  Good insight into her health problem and plan for the future.  On namenda and aricept at home which are continued here.              VTE Risk Mitigation         Ordered     enoxaparin injection 40 mg  Every 24 hours (non-standard times)     Route:  Subcutaneous        11/20/17 1956     Medium Risk of VTE  Once      11/20/17 1956     Place sequential compression device  Until discontinued      11/20/17 1956              Livier York PA-C  Department of Hospital Medicine   [facility]  Pager: [phone]

## 2017-11-28 PROBLEM — R79.89 ELEVATED TROPONIN: Status: RESOLVED | Noted: 2017-11-24 | Resolved: 2017-11-28

## 2017-11-28 PROBLEM — I35.0 AORTIC STENOSIS, MODERATE: Chronic | Status: ACTIVE | Noted: 2017-11-27

## 2017-11-28 LAB
ALLENS TEST: ABNORMAL
ANION GAP SERPL CALC-SCNC: 8 MMOL/L
BNP SERPL-MCNC: 158 PG/ML
BUN SERPL-MCNC: 26 MG/DL
CALCIUM SERPL-MCNC: 7.8 MG/DL
CHLORIDE SERPL-SCNC: 102 MMOL/L
CO2 SERPL-SCNC: 23 MMOL/L
CREAT SERPL-MCNC: 0.9 MG/DL
EST. GFR  (AFRICAN AMERICAN): >60 ML/MIN/1.73 M^2
EST. GFR  (NON AFRICAN AMERICAN): 56 ML/MIN/1.73 M^2
GLUCOSE SERPL-MCNC: 223 MG/DL
HCO3 UR-SCNC: 21 MMOL/L (ref 24–28)
HCT VFR BLD CALC: 30 %PCV (ref 36–54)
HGB BLD-MCNC: 10 G/DL
MAGNESIUM SERPL-MCNC: 1.8 MG/DL
PCO2 BLDA: 28.8 MMHG (ref 35–45)
PH SMN: 7.47 [PH] (ref 7.35–7.45)
PHOSPHATE SERPL-MCNC: 4.3 MG/DL
PO2 BLDA: 87 MMHG (ref 80–100)
POC BE: -3 MMOL/L
POC IONIZED CALCIUM: 1.09 MMOL/L (ref 1.06–1.42)
POC SATURATED O2: 97 % (ref 95–100)
POC TCO2: 22 MMOL/L (ref 23–27)
POTASSIUM BLD-SCNC: 4 MMOL/L (ref 3.5–5.1)
POTASSIUM SERPL-SCNC: 4.6 MMOL/L
SAMPLE: ABNORMAL
SITE: ABNORMAL
SODIUM BLD-SCNC: 137 MMOL/L (ref 136–145)
SODIUM SERPL-SCNC: 133 MMOL/L
TROPONIN I SERPL DL<=0.01 NG/ML-MCNC: 0.02 NG/ML

## 2017-11-28 PROCEDURE — 63600175 PHARM REV CODE 636 W HCPCS: Performed by: HOSPITALIST

## 2017-11-28 PROCEDURE — 25000003 PHARM REV CODE 250: Performed by: STUDENT IN AN ORGANIZED HEALTH CARE EDUCATION/TRAINING PROGRAM

## 2017-11-28 PROCEDURE — 94003 VENT MGMT INPAT SUBQ DAY: CPT

## 2017-11-28 PROCEDURE — 94640 AIRWAY INHALATION TREATMENT: CPT

## 2017-11-28 PROCEDURE — 25000003 PHARM REV CODE 250: Performed by: PHYSICIAN ASSISTANT

## 2017-11-28 PROCEDURE — 80048 BASIC METABOLIC PNL TOTAL CA: CPT

## 2017-11-28 PROCEDURE — 84100 ASSAY OF PHOSPHORUS: CPT

## 2017-11-28 PROCEDURE — 82803 BLOOD GASES ANY COMBINATION: CPT

## 2017-11-28 PROCEDURE — 37799 UNLISTED PX VASCULAR SURGERY: CPT

## 2017-11-28 PROCEDURE — 99900035 HC TECH TIME PER 15 MIN (STAT)

## 2017-11-28 PROCEDURE — 25000242 PHARM REV CODE 250 ALT 637 W/ HCPCS: Performed by: HOSPITALIST

## 2017-11-28 PROCEDURE — 84484 ASSAY OF TROPONIN QUANT: CPT

## 2017-11-28 PROCEDURE — 83735 ASSAY OF MAGNESIUM: CPT

## 2017-11-28 PROCEDURE — 94799 UNLISTED PULMONARY SVC/PX: CPT

## 2017-11-28 PROCEDURE — 83880 ASSAY OF NATRIURETIC PEPTIDE: CPT

## 2017-11-28 PROCEDURE — 94761 N-INVAS EAR/PLS OXIMETRY MLT: CPT

## 2017-11-28 PROCEDURE — 27000221 HC OXYGEN, UP TO 24 HOURS

## 2017-11-28 PROCEDURE — S0028 INJECTION, FAMOTIDINE, 20 MG: HCPCS | Performed by: PHYSICIAN ASSISTANT

## 2017-11-28 PROCEDURE — A4216 STERILE WATER/SALINE, 10 ML: HCPCS | Performed by: ANESTHESIOLOGY

## 2017-11-28 PROCEDURE — 63600175 PHARM REV CODE 636 W HCPCS: Performed by: STUDENT IN AN ORGANIZED HEALTH CARE EDUCATION/TRAINING PROGRAM

## 2017-11-28 PROCEDURE — 25000003 PHARM REV CODE 250: Performed by: HOSPITALIST

## 2017-11-28 PROCEDURE — 97802 MEDICAL NUTRITION INDIV IN: CPT

## 2017-11-28 PROCEDURE — 20000000 HC ICU ROOM

## 2017-11-28 PROCEDURE — 97164 PT RE-EVAL EST PLAN CARE: CPT

## 2017-11-28 PROCEDURE — 63600175 PHARM REV CODE 636 W HCPCS: Performed by: PHYSICIAN ASSISTANT

## 2017-11-28 PROCEDURE — 25000003 PHARM REV CODE 250: Performed by: ANESTHESIOLOGY

## 2017-11-28 RX ORDER — FUROSEMIDE 10 MG/ML
40 INJECTION INTRAMUSCULAR; INTRAVENOUS ONCE
Status: COMPLETED | OUTPATIENT
Start: 2017-11-28 | End: 2017-11-28

## 2017-11-28 RX ORDER — ENALAPRILAT 1.25 MG/ML
1.25 INJECTION INTRAVENOUS EVERY 6 HOURS
Status: DISCONTINUED | OUTPATIENT
Start: 2017-11-28 | End: 2017-12-04 | Stop reason: HOSPADM

## 2017-11-28 RX ADMIN — METHOCARBAMOL 500 MG: 500 TABLET ORAL at 06:11

## 2017-11-28 RX ADMIN — SODIUM CHLORIDE, PRESERVATIVE FREE 3 ML: 5 INJECTION INTRAVENOUS at 06:11

## 2017-11-28 RX ADMIN — SODIUM CHLORIDE, PRESERVATIVE FREE 3 ML: 5 INJECTION INTRAVENOUS at 09:11

## 2017-11-28 RX ADMIN — METHOCARBAMOL 500 MG: 500 TABLET ORAL at 01:11

## 2017-11-28 RX ADMIN — MEMANTINE HYDROCHLORIDE 10 MG: 5 TABLET ORAL at 08:11

## 2017-11-28 RX ADMIN — MEMANTINE HYDROCHLORIDE 10 MG: 5 TABLET ORAL at 09:11

## 2017-11-28 RX ADMIN — DONEPEZIL HYDROCHLORIDE 10 MG: 5 TABLET, FILM COATED ORAL at 09:11

## 2017-11-28 RX ADMIN — CHLORHEXIDINE GLUCONATE 15 ML: 1.2 RINSE ORAL at 08:11

## 2017-11-28 RX ADMIN — IPRATROPIUM BROMIDE AND ALBUTEROL SULFATE 3 ML: .5; 3 SOLUTION RESPIRATORY (INHALATION) at 01:11

## 2017-11-28 RX ADMIN — ESCITALOPRAM OXALATE 20 MG: 20 TABLET, FILM COATED ORAL at 08:11

## 2017-11-28 RX ADMIN — FAMOTIDINE 20 MG: 10 INJECTION, SOLUTION INTRAVENOUS at 08:11

## 2017-11-28 RX ADMIN — FUROSEMIDE 40 MG: 10 INJECTION, SOLUTION INTRAMUSCULAR; INTRAVENOUS at 06:11

## 2017-11-28 RX ADMIN — SODIUM CHLORIDE, PRESERVATIVE FREE 3 ML: 5 INJECTION INTRAVENOUS at 01:11

## 2017-11-28 RX ADMIN — ENOXAPARIN SODIUM 40 MG: 100 INJECTION SUBCUTANEOUS at 09:11

## 2017-11-28 RX ADMIN — ACETAMINOPHEN 650 MG: 325 TABLET ORAL at 04:11

## 2017-11-28 RX ADMIN — ENALAPRILAT 1.25 MG: 1.25 INJECTION, SOLUTION INTRAVENOUS at 05:11

## 2017-11-28 RX ADMIN — ASPIRIN 81 MG 81 MG: 81 TABLET ORAL at 08:11

## 2017-11-28 RX ADMIN — METHOCARBAMOL 500 MG: 500 TABLET ORAL at 05:11

## 2017-11-28 RX ADMIN — IPRATROPIUM BROMIDE AND ALBUTEROL SULFATE 3 ML: .5; 3 SOLUTION RESPIRATORY (INHALATION) at 08:11

## 2017-11-28 RX ADMIN — POTASSIUM PHOSPHATE, MONOBASIC AND POTASSIUM PHOSPHATE, DIBASIC 15 MMOL: 224; 236 INJECTION, SOLUTION, CONCENTRATE INTRAVENOUS at 11:11

## 2017-11-28 RX ADMIN — DONEPEZIL HYDROCHLORIDE 10 MG: 5 TABLET, FILM COATED ORAL at 08:11

## 2017-11-28 RX ADMIN — IPRATROPIUM BROMIDE AND ALBUTEROL SULFATE 3 ML: .5; 3 SOLUTION RESPIRATORY (INHALATION) at 07:11

## 2017-11-28 RX ADMIN — FENTANYL CITRATE 50 MCG: 50 INJECTION, SOLUTION INTRAMUSCULAR; INTRAVENOUS at 12:11

## 2017-11-28 NOTE — PROGRESS NOTES
Results for PAM BUNCH (MRN [id number]) as of 11/27/2017 22:39   Ref. Range 11/27/2017 20:07   POC PH Latest Ref Range: 7.35 - 7.45  7.395   POC PCO2 Latest Ref Range: 35 - 45 mmHg 38.8   POC PO2 Latest Ref Range: 80 - 100 mmHg 191 (H)   POC BE Latest Ref Range: -2 to 2 mmol/L -1   POC HCO3 Latest Ref Range: 24 - 28 mmol/L 23.8 (L)   POC SATURATED O2 Latest Ref Range: 95 - 100 % 100   POC TCO2 Latest Ref Range: 23 - 27 mmol/L 25   FiO2 Unknown 60   Vt Unknown 380   PEEP Unknown 5   Sample Unknown ARTERIAL   DelSys Unknown Adult Vent   Allens Test Unknown N/A   Site Unknown [place]/UAC   Mode Unknown AC/PRVC   Results reported to QUINTON Cooper .

## 2017-11-28 NOTE — PT/OT/SLP RE-EVAL
Physical Therapy  Re-evaluation    Lesa Holder   MRN: [id number]   Admitting Diagnosis: Intestinal obstruction    PT Received On: 11/28/17  PT Start Time: 1140     PT Stop Time: 1148    PT Total Time (min): 8 min       Billable Minutes:  Re-eval 8    Diagnosis: Intestinal obstruction      Past Medical History:   Diagnosis Date    Cataract 1998    remove from left eye    Dementia     Depression     GERD (gastroesophageal reflux disease)     Hypertension       Past Surgical History:   Procedure Laterality Date    HYSTERECTOMY           General Precautions: Standard, fall, NPO  Orthopedic Precautions: N/A   Braces: N/A       Do you have any cultural, spiritual, Latter day conflicts, given your current situation?: None verbalized to PT    Patient History:  Living Environment Comment: Lives with spouse in [facility] with NSTE. Mainly uses jacuzzi tub but also has WIS with shower chair. Uses SPC for community distances only 2* to R knee pain. Pt denies and recent falls/near falls. Pt reports being indep with all ADLs and IADLs.   Equipment Currently Used at Home: cane, straight  DME owned (not currently used): none    Previous Level of Function:  Ambulation Skills: needs device  Transfer Skills: needs device  ADL Skills: needs device  Work/Leisure Activity: needs device    Subjective:  Communicated with nsg prior to session.  Pt declined sitting at EOB 2/2 weakness(pt extubated this morning)  Chief Complaint: weakness  Patient goals: PLOF    Pain/Comfort  Pain Rating 1: 0/10    Objective:   Patient found with: NG tube, blood pressure cuff, gamboa catheter, SCD, telemetry, pulse ox (continuous), peripheral IV, oxygen     Cognitive Exam:  Oriented to: Person, Place, Time and Situation    Follows Commands/attention: Follows one-step commands  Communication: clear/fluent  Safety awareness/insight to disability: intact    Physical Exam:  Postural examination/scapula alignment: N./A    Skin integrity: Visible skin intact  Edema:  Mild R hand and B LE's    Sensation:   Intact  light/touch B LE's      Lower Extremity Range of Motion:  Right Lower Extremity: WFL  Left Lower Extremity: WFL    Lower Extremity Strength:  Right Lower Extremity: WFL  Left Lower Extremity: WFL     Fine motor coordination:  N/T    Gross motor coordination: impaired 2/2 weakness    Functional Mobility:  Bed Mobility:  Rolling/Turning to Left: Dependent  Rolling/Turning Right: Dependent    Transfers:  Sit <> Stand Assistance:  (activity did not occur)    Gait:   Gait Distance: activity did not occur    Stairs:  N/A      Balance:   Static Sit: N/T  Dynamic Sit: N/T  Static Stand: N/T  Dynamic stand: N/T    Therapeutic Activities and Exercises:  Pt performed B AP's, HS's, Hip ABD/Add, and TKE's x 10 reps and received B HCS [id number]    AM-PAC 6 CLICK MOBILITY  How much help from another person does this patient currently need?   1 = Unable, Total/Dependent Assistance  2 = A lot, Maximum/Moderate Assistance  3 = A little, Minimum/Contact Guard/Supervision  4 = None, Modified [place]/Independent    Turning over in bed (including adjusting bedclothes, sheets and blankets)?: 1  Sitting down on and standing up from a chair with arms (e.g., wheelchair, bedside commode, etc.): 1  Moving from lying on back to sitting on the side of the bed?: 1  Moving to and from a bed to a chair (including a wheelchair)?: 1  Need to walk in hospital room?: 1  Climbing 3-5 steps with a railing?: 1  Total Score: 6     AM-PAC Raw Score CMS G-Code Modifier Level of Impairment Assistance   6 % Total / Unable   7 - 9 CM 80 - 100% Maximal Assist   10 - 14 CL 60 - 80% Moderate Assist   15 - 19 CK 40 - 60% Moderate Assist   20 - 22 CJ 20 - 40% Minimal Assist   23 CI 1-20% SBA / CGA   24 CH 0% Independent/ Mod I     Patient left supine with all lines intact, nsg notified and spouse present.    Assessment:   Lesa Holder is a 89 y.o. female with a medical diagnosis of Intestinal obstruction  and presents with decreased functional independence 2/2 abdominal sx.  Pt could benefit from skilled PT services to address deficits below in order to maximize function prior to D/C .    Rehab identified problem list/impairments: Rehab identified problem list/impairments: weakness, impaired endurance, gait instability, impaired functional mobilty, impaired self care skills, impaired balance, edema, impaired cardiopulmonary response to activity, decreased ROM, impaired coordination, impaired joint extensibility    Rehab potential is good.    Activity tolerance: Fair    Discharge recommendations: Discharge Facility/Level Of Care Needs:  (ongoing asessment pendiong pt progress)     Barriers to discharge: Barriers to Discharge: Decreased caregiver support    Equipment recommendations: Equipment Needed After Discharge:  (ongoing assessment pending pt progress)     GOALS:    Physical Therapy Goals        Problem: Physical Therapy Goal    Goal Priority Disciplines Outcome Goal Variances Interventions   Physical Therapy Goal     PT/OT, PT Ongoing (interventions implemented as appropriate)     Description:  Goals to be met by: 2017  Patient will increase functional independence with mobility by performin. Supine <> sit with [place]  2. Sit <> stand transfer with Modified [place] with/without RW  3. Gait  x 150 feet with Modified [place] using Rolling Walker.                        PLAN:    Patient to be seen 6 x/week to address the above listed problems via gait training, therapeutic activities, therapeutic exercises  Plan of Care expires: 17  Plan of Care reviewed with: patient, spouse          Gali Ambriz, PT  2017

## 2017-11-28 NOTE — NURSING
Pt arrived from OR in bed, pt unresponsive,  manual ventilation via ETT, cardiac monitor in place showing NSR, VSS.  Placed on ventilator by RT.

## 2017-11-28 NOTE — ASSESSMENT & PLAN NOTE
SBP . Decrease IV enalaprit from 2.5 mg to 1.25 mg every 6 hours.  PRN hydralazine 10 mg every 8 hours. Monitor.

## 2017-11-28 NOTE — PT/OT/SLP PROGRESS
Physical Therapy      Lesa Holder  MRN: [id number]    Therapy to d/c orders at this time 2/2 pt to OR for ex-lap procedure. Will require new orders post surgery when pt medically stable for participation with activity clarifications/precautions.    Lupe Sebastian, PT   11/27/2017

## 2017-11-28 NOTE — PROGRESS NOTES
"[facility] Pulmonary/Critical Care Fellow Progress Note    Subjective:      Extubated this AM doing well.     Objective:   24-hour Vitals:  Temp:  [97.1 °F (36.2 °C)-99.2 °F (37.3 °C)] 99.2 °F (37.3 °C)  Pulse:  [] 101  Resp:  [14-43] 31  SpO2:  [96 %-100 %] 97 %  BP: ()/(52-61) 118/55  Arterial Line BP: (100-154)/(46-69) 134/53    Physical Examination:  Vitals: BP (!) 118/55   Pulse 101   Temp 99.2 °F (37.3 °C) (Axillary)   Resp (!) 31   Ht 5' 3" (1.6 m)   Wt 74.5 kg (164 lb 3.9 oz)   SpO2 97%   Breastfeeding? No   BMI 29.09 kg/m²     General: Awake, alert, NAD  HEENT: MMM, EOM conjunctivae  CV: RRR, normal S1/S2, no MRG  Chest: Faint crackles at bilateral bases  Abdomen: Dressing c/d/i  Extremities: No cyanosis, clubbing, edema    Laboratory:  Trended Lab Data:    Recent Labs  Lab 11/27/17  0520 11/27/17 2007 11/28/17  0500   WBC 9.22  --   --    HGB 11.7*  --   --    HCT 35.3* 34* 30*     --   --          Recent Labs  Lab 11/26/17  0722 11/27/17  0520 11/28/17  0502   *  134* 133* 133*   K 3.6  3.6 3.6 4.6   CL 99  99 99 102   CO2 26  26 25 23   BUN 30*  30* 26* 26*   CREATININE 0.8  0.8 0.7 0.9   *  182* 195* 223*   CALCIUM 8.4*  8.4* 8.0* 7.8*   MG 2.0 1.9 1.8   PHOS 2.6* 3.4 4.3         Recent Labs  Lab 11/27/17  0520   PROT 5.7*   ALBUMIN 2.2*   BILITOT 0.5   AST 25   ALT 16   ALKPHOS 66       No results for input(s): PROTIME, PTT, INR in the last 168 hours.    Cardiac:   Recent Labs  Lab 11/24/17  2045 11/25/17  0335 11/28/17  0502   TROPONINI 0.039* 0.033* 0.022   BNP  --   --  158*       FLP: Lab Results   Component Value Date    CHOL 229 (H) 03/28/2013    HDL 66 03/28/2013    LDLCALC 148.0 03/28/2013    TRIG 77 03/28/2013    CHOLHDL 28.8 03/28/2013     DM: Lab Results   Component Value Date    LDLCALC 148.0 03/28/2013    CREATININE 0.9 11/28/2017     Thyroid: No results found for: TSH, FREET4, J4DIJHO, W2SHMAZ, THYROIDAB  Anemia: No results found for: IRON, TIBC, " FERRITIN, UZJDPTGA32, FOLATE  Urinalysis: Lab Results   Component Value Date    COLORU Yellow 2017    SPECGRAV 1.020 2017    NITRITE Negative 2017    KETONESU Trace (A) 2017    UROBILINOGEN Negative 2017         Current Medications:     Infusions:   TPN ADULT CENTRAL LINE CUSTOM 75 mL/hr at 17 2340    TPN ADULT CENTRAL LINE CUSTOM          Scheduled:   albuterol-ipratropium 2.5mg-0.5mg/3mL  3 mL Nebulization Q6H    aspirin  81 mg Oral Daily    chlorhexidine  15 mL Mouth/Throat BID    donepezil  10 mg Oral BID    enalaprilat  1.25 mg Intravenous Q6H    enoxaparin  40 mg Subcutaneous Q24H    escitalopram oxalate  20 mg Oral Daily    famotidine (PF)  20 mg Intravenous BID    [START ON 2017] fat emulsion 20%  250 mL Intravenous Every Fri    [START ON 2017] fat emulsion 20%  250 mL Intravenous Every Fri    memantine  10 mg Oral BID    methocarbamol  500 mg Oral QID    sodium chloride 0.9%  3 mL Intravenous Q8H        PRN:  acetaminophen, diphenhydrAMINE, [] fentaNYL **FOLLOWED BY** fentaNYL, hydrALAZINE, influenza, ondansetron, ondansetron, ramelteon, sodium chloride 0.9%     Assessment:     Lesa Holder is a 89 y.o.female with  Patient Active Problem List    Diagnosis Date Noted    Aortic stenosis, moderate 2017    Pulmonary hypertension 2017    Intestinal obstruction 2017    Chronic cough 2016    Essential hypertension 2016    Dementia without behavioral disturbance 2016    Depression 2016    Gastroesophageal reflux disease 2016        Plan:     89yoF presenting with SBO now with respiratory failure s/p ex lap requiring intubation.  Extubated this AM.     1. Acute Respiratory Failure (Resolved)  2. SBO  3. Positive Fluid Status     - Normal EF, diastology not reported on in ECHO read  - Extubated this AM doing well  - Would consider gentle diuresis given her net positive fluid status and CXR  suggestive of pulmonary edema with a possible effusion - can repeat imaging in several weeks to ensure resolution of effusion, if it persists it may need to be sampled  - Pulmonary toilet  - Mobilize patient     - Lovenox for VTE ppx     We will sign off, please call with questions.    Yoshi Benavidez  [facility] Pulmonary/Critical Care Fellow

## 2017-11-28 NOTE — ASSESSMENT & PLAN NOTE
S/p ex lap, Charles with SBR POD1  Continue NPO, TPN  Awaiting return of bowel function. ~800-900 clear greenish output per NG. Hopefully she will open up soon  UOP, creatinine ok  Extubated, sats good. Comfortable.  BP stable, mild tachy around 100. Art line out soon.  Likely to remain in ICU for right now  In restraints right now. Was pulling at NG tube overnight. Will discontinue as soon as safe to do so

## 2017-11-28 NOTE — CONSULTS
Pulmonary & Critical Care Medicine   Consultation Note    Reason for Consultation: Mechanical ventilation    HPI: 89yoF with hx of HTN, dementia who presented with nausea, vomiting, abdominal pain x 2 days.  She was found to have an SBO and was treated conservatively as her  did not want to pursue surgery at that time.  Sx did not resolve and she was taken for an exlap today.  No respiratory complaints per chart review.  No hx of lung disease per chart review.  She remains intubated post-procedure for unclear reasons.      Past Medical History:   Diagnosis Date    Cataract 1998    remove from left eye    Dementia     Depression     GERD (gastroesophageal reflux disease)     Hypertension      Past Surgical History:   Procedure Laterality Date    HYSTERECTOMY       Social History:   Social History     Social History    Marital status:      Spouse name: N/A    Number of children: N/A    Years of education: N/A     Occupational History    Not on file.     Social History Main Topics    Smoking status: Never Smoker    Smokeless tobacco: Never Used    Alcohol use No    Drug use: No    Sexual activity: Not Currently     Other Topics Concern    Not on file     Social History Narrative    Lives in [place] with her . 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 Attends [employer].      Family History   Problem Relation Age of Onset    Cancer Daughter      lung    Heart disease Neg Hx      Drug Allergies:   Review of patient's allergies indicates:  No Known Allergies  Current Infusions:   Amino acid 5% - dextrose 20% (CLINIMIX-E) solution with additives (1L provides 50 gm AA, 200 gm CHO (680 kcal/L dextrose), Na 35, K 30, Mg 5, Ca 4.5, Acetate 80, Cl 39, Phos 15) 75 mL/hr at 11/26/17 1825    dextrose 5% lactated ringers 50 mL/hr at 11/25/17 1134    TPN ADULT CENTRAL LINE CUSTOM       Scheduled Medications:     albuterol-ipratropium 2.5mg-0.5mg/3mL  3 mL Nebulization Q6H     aspirin  81 mg Oral Daily    chlorhexidine  15 mL Mouth/Throat BID    donepezil  10 mg Oral BID    enalaprilat  2.5 mg Intravenous Q6H    enoxaparin  40 mg Subcutaneous Q24H    escitalopram oxalate  20 mg Oral Daily    famotidine (PF)  20 mg Intravenous BID    [START ON 12/1/2017] fat emulsion 20%  250 mL Intravenous Every Fri    memantine  10 mg Oral BID    methocarbamol  500 mg Oral QID    sodium chloride 0.9%  3 mL Intravenous Q8H     PRN Medications:   acetaminophen, diphenhydrAMINE, fentaNYL **FOLLOWED BY** fentaNYL, hydrALAZINE, influenza, ondansetron, ondansetron, ramelteon, sodium chloride 0.9%    Review of Systems:   A comprehensive 12-point review of systems was performed, and is negative except for those items mentioned above in the HPI section of this note.     Vital Signs:    Vitals:    11/27/17 1721   BP:    Pulse: 80   Resp: 19   Temp:        Fluid Balance:     Intake/Output Summary (Last 24 hours) at 11/27/17 1803  Last data filed at 11/27/17 1639   Gross per 24 hour   Intake           2597.5 ml   Output             1991 ml   Net            606.5 ml       Physical Exam:   General: Intubated, not currently on sedation but likely remains sedated post-procedurally as she is not responsive  HEENT: ETT in place, no LAD  CV: RRR, no MRG  Pulm: Bilateral rhonchi  Abd: Midline dressing c/d/i  Ext: no edema    Personal Review and Summary of Prior Diagnostics    Laboratory Studies:   No results for input(s): PH, PCO2, PO2, HCO3, POCSATURATED, BE in the last 24 hours.    Recent Labs  Lab 11/27/17  0520   WBC 9.22   RBC 4.03   HGB 11.7*   HCT 35.3*      MCV 88   MCH 29.0   MCHC 33.1       Recent Labs  Lab 11/27/17  0520   *   K 3.6   CL 99   CO2 25   BUN 26*   CREATININE 0.7   MG 1.9       Microbiology Data:   Microbiology Results (last 7 days)     ** No results found for the last 168 hours. **        Summary of Chest Imaging Personally Reviewed:   CXR: Low lung volumes, some haziness in  RLL suggestive of possible atelectasis vs effusion    Impression & Recommendations    89yoF presenting with SBO now with respiratory failure s/p ex lap requiring intubation.  Anesthesia wanted to keep her intubated overnight.    1. Acute Respiratory Failure on MV  2. SBO  3. Positive Fluid Status    - Normal EF, diastology not reported on in ECHO read  - LPV with 6cc/kg; wean FiO2 for sats > 88%  - No plans to return to OR, will plan to extubate as soon as can be tolerated  - Fentanyl PRN for sedation  - May need diuresis depending on FiO2 requirements given her 8L positive fluid status  - Diffuse rhonchi on exam, will get CXR, give nebs and try to manage secretions    - Famotidine for GI ppx, Lovenox for VTE ppx    We will continue to follow, hopeful to extubate in the AM      Yoshi LAM & [facility] Pul[facility]/Critical Care Fellow

## 2017-11-28 NOTE — PLAN OF CARE
Problem: Patient Care Overview  Goal: Plan of Care Review  Outcome: Ongoing (interventions implemented as appropriate)  Cont to monitor resp needs

## 2017-11-28 NOTE — CHAPLAIN
"Patient and  alert and pleasant.  Patient said "I survived"  She also commented "God is Good" and I replied "All the Time".  They will let us know if they need anything.  "

## 2017-11-28 NOTE — PLAN OF CARE
Problem: Physical Therapy Goal  Goal: Physical Therapy Goal  Goals to be met by: 2017  Patient will increase functional independence with mobility by performin. Supine <> sit with [place]  2. Sit <> stand transfer with Modified [place] with/without RW  3. Gait  x 150 feet with Modified [place] using Rolling Walker.      Outcome: Ongoing (interventions implemented as appropriate)  Limited bed level Re-eval completed.  Pt could benefit from continued skilled PT services 6x/wk in order to maximize function prior to D/C.  Ongoing assessment pending pt progress for DME and disposition.

## 2017-11-28 NOTE — PLAN OF CARE
Problem: Nutrition, Parenteral (Adult)  Goal: Signs and Symptoms of Listed Potential Problems Will be Absent, Minimized or Managed (Nutrition, Parenteral)  Signs and symptoms of listed potential problems will be absent, minimized or managed by discharge/transition of care (reference Nutrition, Parenteral (Adult) CPG).  Outcome: Ongoing (interventions implemented as appropriate)  Recommendation/Intervention:   1. Continue current TPN for now.   2. Initiate Clear Liquid diet when medically acceptable.    Goals:   Pt will tolerate TPN  Nutrition Goal Status: new  Communication of RD Recs: reviewed with RN

## 2017-11-28 NOTE — ASSESSMENT & PLAN NOTE
Related to (etiology):   S/p surgery    Signs and Symptoms (as evidenced by):   NPO on TPN    Interventions/Recommendations (treatment strategy):  See recs    Nutrition Diagnosis Status:   Continues

## 2017-11-28 NOTE — PROGRESS NOTES
Results for PAM BUNCH (MRN [id number]) as of 11/28/2017 05:46   Ref. Range 11/28/2017 05:00   POC PH Latest Ref Range: 7.35 - 7.45  7.471 (H)   POC PCO2 Latest Ref Range: 35 - 45 mmHg 28.8 (LL)   POC PO2 Latest Ref Range: 80 - 100 mmHg 87   POC BE Latest Ref Range: -2 to 2 mmol/L -3   POC HCO3 Latest Ref Range: 24 - 28 mmol/L 21.0 (L)   POC SATURATED O2 Latest Ref Range: 95 - 100 % 97   POC TCO2 Latest Ref Range: 23 - 27 mmol/L 22 (L)   Sample Unknown ARTERIAL   Allens Test Unknown N/A   Site Unknown [facility]   Results reported to QUINTON Cooper

## 2017-11-28 NOTE — PROGRESS NOTES
[facility]  General Surgery  Progress Note    Subjective:     History of Present Illness:  No notes on file    Post-Op Info:  Procedure(s) (LRB):  LAPAROTOMY-EXPLORATORY (N/A)  RESECTION-SMALL BOWEL   1 Day Post-Op     Interval History:   Extubated this morning  Pain controlled. States she has little or no pain currently  No flatus yet  Denies NV  Stable overnight    Medications:  Continuous Infusions:   dextrose 5% lactated ringers 50 mL/hr at 17 1914    TPN ADULT CENTRAL LINE CUSTOM 75 mL/hr at 17 2340     Scheduled Meds:   albuterol-ipratropium 2.5mg-0.5mg/3mL  3 mL Nebulization Q6H    aspirin  81 mg Oral Daily    chlorhexidine  15 mL Mouth/Throat BID    donepezil  10 mg Oral BID    enalaprilat  2.5 mg Intravenous Q6H    enoxaparin  40 mg Subcutaneous Q24H    escitalopram oxalate  20 mg Oral Daily    famotidine (PF)  20 mg Intravenous BID    [START ON 2017] fat emulsion 20%  250 mL Intravenous Every Fri    memantine  10 mg Oral BID    methocarbamol  500 mg Oral QID    sodium chloride 0.9%  3 mL Intravenous Q8H     PRN Meds:acetaminophen, diphenhydrAMINE, [] fentaNYL **FOLLOWED BY** fentaNYL, hydrALAZINE, influenza, ondansetron, ondansetron, ramelteon, sodium chloride 0.9%     Review of patient's allergies indicates:  No Known Allergies  Objective:     Vital Signs (Most Recent):  Temp: 98.8 °F (37.1 °C) (17 0705)  Pulse: 88 (17 0930)  Resp: (!) 28 (17)  BP: (!) 106/55 (17 0900)  SpO2: 97 % (17 09) Vital Signs (24h Range):  Temp:  [97.1 °F (36.2 °C)-98.8 °F (37.1 °C)] 98.8 °F (37.1 °C)  Pulse:  [] 88  Resp:  [14-43] 28  SpO2:  [96 %-100 %] 97 %  BP: ()/(52-62) 106/55  Arterial Line BP: (100-154)/(46-63) 128/48     Weight: 74.5 kg (164 lb 3.9 oz)  Body mass index is 29.09 kg/m².    Intake/Output - Last 3 Shifts       700 -  -  - 659    P.O. 0      I.V.  (mL/kg) 1537.5 (20) 2312.5 (31) 150 (2)    NG/GT 30 120 50    IV Piggyback 600       475 225    Total Intake(mL/kg) 3017.5 (39.2) 2907.5 (39) 425 (5.7)    Urine (mL/kg/hr) 750 (0.4) 975 (0.5) 85 (0.4)    Emesis/NG output  250 (0.1)     Drains 930 (0.5) 625 (0.3)     Stool 1 (0)      Total Output 1681 1850 85    Net +1336.5 +1057.5 +340           Urine Occurrence 300 x      Stool Occurrence 3 x            Physical Exam   Constitutional: No distress.   Cardiovascular: Regular rhythm.    Pulmonary/Chest: No respiratory distress.   Abdominal: Soft. There is tenderness.   Incision bandaged, some serosanguinous output on lower dressing   Neurological: She is alert.   Skin: She is not diaphoretic.       Significant Labs:  CBC:   Recent Labs  Lab 11/27/17  0520  11/28/17  0500   WBC 9.22  --   --    RBC 4.03  --   --    HGB 11.7*  --   --    HCT 35.3*  < > 30*     --   --    MCV 88  --   --    MCH 29.0  --   --    MCHC 33.1  --   --    < > = values in this interval not displayed.  BMP:   Recent Labs  Lab 11/28/17  0502   *   *   K 4.6      CO2 23   BUN 26*   CREATININE 0.9   CALCIUM 7.8*   MG 1.8     ABGs:   Recent Labs  Lab 11/28/17  0500   PH 7.471*   PCO2 28.8*   PO2 87   HCO3 21.0*   POCSATURATED 97   BE -3       Significant Diagnostics:  I have reviewed all pertinent imaging results/findings within the past 24 hours.    Assessment/Plan:     * Intestinal obstruction    S/p ex lap, [place] with SBR POD1  Continue NPO, TPN  Awaiting return of bowel function. ~800-900 clear greenish output per NG. Hopefully she will open up soon  UOP, creatinine ok  Extubated, sats good. Comfortable.  BP stable, mild tachy around 100. Art line out soon.  Likely to remain in ICU for right now  In restraints right now. Was pulling at NG tube overnight. Will discontinue as soon as safe to do so              Barney Gardner MD  General Surgery  [facility]

## 2017-11-28 NOTE — PLAN OF CARE
Problem: Patient Care Overview  Goal: Plan of Care Review  Pt received as noted on vent flow sheet. No respiratory distress noted. Vent alarms audible and functioning. Will cont to monitor.

## 2017-11-28 NOTE — PROGRESS NOTES
"[facility]  Adult Nutrition  Progress Note    SUMMARY     Recommendations    Recommendation/Intervention:   1. Continue current TPN for now.   2. Initiate Clear Liquid diet when medically acceptable.    Goals:   Pt will tolerate TPN  Nutrition Goal Status: new  Communication of RD Recs: reviewed with RN    Continuum of Care Plan  Referral to Outpatient Services:  (d/c needs to be determined)    Reason for Assessment  Reason for Assessment: new TPN  Diagnosis:  (intestinal obstruction)  Relevent Medical History: cataract, GERD, depression, dementia, HTN      General Information Comments: Pt NPO. s/p expl lap & SBR today. NG tube. Receiving custom TPN at 75ml/hr with IVFE every Friday.    Nutrition Prescription Ordered  Current Diet Order: NPO  Current Nutrition Support Formula Ordered:  (custom TPN at 75ml/hr)     Evaluation of Received Nutrients/Fluid Intake  Parenteral Calories (kcal): 1584  Parenteral Protein (gm): 90  Parenteral Fluid (mL): 1800  Total Calories (kcal): 1655  Energy Calories Required: not meeting needs  % Kcal Needs: 89  Protein Required: meeting needs  % Protein Needs: 120  Lipid Calories (kcals): 71 kcals  Comments: LBM 11/26  % Intake of Estimated Energy Needs: 75 - 100 %  % Meal Intake: NPO     Nutrition Risk Screen   parenteral nutrition    Nutrition/Diet History  Food Preferences: no Spiritism or cultural food pref identified  Factors Affecting Nutritional Intake: altered gastrointestinal function, NPO     Labs/Tests/Procedures/Meds  Pertinent Labs Reviewed: reviewed  Pertinent Labs Comments: Na 133L, BUN 26H, Glu 195H, Ca 8.0L,Alb 2.2L  Pertinent Medications Reviewed: reviewed  Pertinent Medications Comments: aspirin, 5% dex at 50ml/hr    Physical Findings  Overall Physical Appearance: overweight  Tubes: nasogastric tube  Oral/Mouth Cavity:  (unable to assess)  Skin:  (Alberto 18-surgical)    Anthropometrics  Temp: 97.1 °F (36.2 °C)  Height: 5' 3" (160 cm)  Weight Method: " Bed Scale  Weight: 74.5 kg (164 lb 3.9 oz)  Ideal Body Weight (IBW), Female: 115 lb  % Ideal Body Weight, Female (lb): 142.82 lb  BMI (Calculated): 29.2  BMI Grade: 25 - 29.9 - overweight     Estimated/Assessed Needs  Weight Used For Calorie Calculations: 74.5 kg (164 lb 3.9 oz)   Energy Calorie Requirements (kcal): 1863  Energy Need Method: Kcal/kg (25 kcal/kg)  RMR ([place]-St. Jeor Equation): 1134.12  Weight Used For Protein Calculations: 74.5 kg (164 lb 3.9 oz)  Protein Requirements: 75g (1.0g/kg)  Fluid Need Method: RDA Method  RDA Method (mL): 1863     Assessment and Plan    * Intestinal obstruction    Related to (etiology):   S/p surgery    Signs and Symptoms (as evidenced by):   NPO on TPN    Interventions/Recommendations (treatment strategy):  See recs    Nutrition Diagnosis Status:   New            Monitor and Evaluation  Food and Nutrient Intake: parenteral nutrition intake  Food and Nutrient Adminstration: enteral and parenteral nutrition administration  Physical Activity and Function: nutrition-related ADLs and IADLs  Anthropometric Measurements: weight  Biochemical Data, Medical Tests and Procedures: electrolyte and renal panel  Nutrition-Focused Physical Findings: overall appearance    Nutrition Risk  Level of Risk:  (2xweekly)    Nutrition Follow-Up  RD Follow-up?: Yes

## 2017-11-28 NOTE — PROGRESS NOTES
Consult received for assessment for intubation. Patient extubated today. Receiving PN. Seen by services on 11/27.  Will f/u routinely.    Recommendations:   1. Rec lipids at minimum 2 x week to prevent EFA deficiency as patient receiving sole source of nutrition via PN.  2. RD to monitor

## 2017-11-28 NOTE — SUBJECTIVE & OBJECTIVE
Interval History:   Extubated this morning  Pain controlled. States she has little or no pain currently  No flatus yet  Denies NV  Stable overnight    Medications:  Continuous Infusions:   dextrose 5% lactated ringers 50 mL/hr at 17 1914    TPN ADULT CENTRAL LINE CUSTOM 75 mL/hr at 17 2340     Scheduled Meds:   albuterol-ipratropium 2.5mg-0.5mg/3mL  3 mL Nebulization Q6H    aspirin  81 mg Oral Daily    chlorhexidine  15 mL Mouth/Throat BID    donepezil  10 mg Oral BID    enalaprilat  2.5 mg Intravenous Q6H    enoxaparin  40 mg Subcutaneous Q24H    escitalopram oxalate  20 mg Oral Daily    famotidine (PF)  20 mg Intravenous BID    [START ON 2017] fat emulsion 20%  250 mL Intravenous Every Fri    memantine  10 mg Oral BID    methocarbamol  500 mg Oral QID    sodium chloride 0.9%  3 mL Intravenous Q8H     PRN Meds:acetaminophen, diphenhydrAMINE, [] fentaNYL **FOLLOWED BY** fentaNYL, hydrALAZINE, influenza, ondansetron, ondansetron, ramelteon, sodium chloride 0.9%     Review of patient's allergies indicates:  No Known Allergies  Objective:     Vital Signs (Most Recent):  Temp: 98.8 °F (37.1 °C) (17 0705)  Pulse: 88 (17 09)  Resp: (!) 28 (17)  BP: (!) 106/55 (17 0900)  SpO2: 97 % (17) Vital Signs (24h Range):  Temp:  [97.1 °F (36.2 °C)-98.8 °F (37.1 °C)] 98.8 °F (37.1 °C)  Pulse:  [] 88  Resp:  [14-43] 28  SpO2:  [96 %-100 %] 97 %  BP: ()/(52-62) 106/55  Arterial Line BP: (100-154)/(46-63) 128/48     Weight: 74.5 kg (164 lb 3.9 oz)  Body mass index is 29.09 kg/m².    Intake/Output - Last 3 Shifts       700 -  06 -  0659 700 -  0659    P.O. 0      I.V. (mL/kg) 1537.5 (20) 2312.5 (31) 150 (2)    NG/GT 30 120 50    IV Piggyback 600       475 225    Total Intake(mL/kg) 3017.5 (39.2) 2907.5 (39) 425 (5.7)    Urine (mL/kg/hr) 750 (0.4) 975 (0.5) 85 (0.4)    Emesis/NG output  250 (0.1)      Drains 930 (0.5) 625 (0.3)     Stool 1 (0)      Total Output 1681 1850 85    Net +1336.5 +1057.5 +340           Urine Occurrence 300 x      Stool Occurrence 3 x            Physical Exam   Constitutional: No distress.   Cardiovascular: Regular rhythm.    Pulmonary/Chest: No respiratory distress.   Abdominal: Soft. There is tenderness.   Incision bandaged, some serosanguinous output on lower dressing   Neurological: She is alert.   Skin: She is not diaphoretic.       Significant Labs:  CBC:   Recent Labs  Lab 11/27/17  0520  11/28/17  0500   WBC 9.22  --   --    RBC 4.03  --   --    HGB 11.7*  --   --    HCT 35.3*  < > 30*     --   --    MCV 88  --   --    MCH 29.0  --   --    MCHC 33.1  --   --    < > = values in this interval not displayed.  BMP:   Recent Labs  Lab 11/28/17  0502   *   *   K 4.6      CO2 23   BUN 26*   CREATININE 0.9   CALCIUM 7.8*   MG 1.8     ABGs:   Recent Labs  Lab 11/28/17  0500   PH 7.471*   PCO2 28.8*   PO2 87   HCO3 21.0*   POCSATURATED 97   BE -3       Significant Diagnostics:  I have reviewed all pertinent imaging results/findings within the past 24 hours.

## 2017-11-28 NOTE — PT/OT/SLP DISCHARGE
Physical Therapy Discharge Summary    Lesa Holder  MRN: [id number]   Intestinal obstruction   Patient Discharged from acute Physical Therapy on 2017.  Please refer to prior PT noted date on 2017 for functional status.     Assessment:   Patient has just had ex lap today and will need new orders to resume therapy  GOALS:    Physical Therapy Goals        Problem: Physical Therapy Goal    Goal Priority Disciplines Outcome Goal Variances Interventions   Physical Therapy Goal     PT/OT, PT Ongoing (interventions implemented as appropriate)     Description:  Goals to be met by:      Patient will increase functional independence with mobility by performin. Supine <> sit with [place]  2. Sit <> stand transfer with Modified [place] with/without RW  3. Gait  x 150 feet with Modified [place] using Rolling Walker.                     Reasons for Discontinuation of Therapy Services  Pt s/p ex lap  today and will need medical clearance to resume therapy    Plan:  Patient Discharged to: Pt will remain in ICU s/p ex lap; will need new therapy orders to resume  Lupe Sebastian, PT  2017

## 2017-11-28 NOTE — PROGRESS NOTES
[facility] Hospital Medicine  Progress Note    Patient Name: Lesa Holder  MRN: [id number]  Patient Class: IP- Inpatient   Admission Date: 2017  Length of Stay: 8 days  Attending Physician: Barney Gardner MD  Primary Care Provider: Miriam Saunders MD        Subjective:     Principal Problem:Intestinal obstruction    HPI:  89 female with hypertension and mild cognitive impairment admitted to General Surgery for N/V with SBO.  Hospital Medicine consulted for elevated BP and troponin.      Pt seen in her room with her , Ty, present. He states that her SBP usually runs 130s-150s.  Pt denies current chest pain or abdominal pain. Denies h/o heart attack or heart surgery.  Denies use of alcohol, tobacco or illicit drugs.     Pt's mother  in her 70s of unknown cause; father  in MVA.  Pt had 1 brother who  of unknown cause.  Pt had 4 children, 1 is still living.  Pt and  live together by themselves.     Hospital Course:  BP on lower end of normal so decreasing dose of IV enalaprit.  Noted increased blood glucose and positive fluid status so stopped D5W IVF and giving 1 dose of IV furosemide for volume overload.      Interval History: Pt c/o moderate abdominal pain around surgical site.  Denies CP, SOB.     Review of Systems   Respiratory: Negative for shortness of breath.    Cardiovascular: Negative for chest pain.   Gastrointestinal: Positive for abdominal pain.     Objective:     Vital Signs (Most Recent):  Temp: 99.2 °F (37.3 °C) (17 1600)  Pulse: 101 (17 1615)  Resp: (!) 31 (17 1615)  BP: (!) 118/55 (17 1600)  SpO2: 97 % (175) Vital Signs (24h Range):  Temp:  [97.1 °F (36.2 °C)-99.2 °F (37.3 °C)] 99.2 °F (37.3 °C)  Pulse:  [] 101  Resp:  [14-43] 31  SpO2:  [96 %-100 %] 97 %  BP: ()/(52-61) 118/55  Arterial Line BP: (100-154)/(46-69) 134/53     Weight: 74.5 kg (164 lb 3.9 oz)  Body mass index is 29.09  kg/m².    Intake/Output Summary (Last 24 hours) at 11/28/17 1729  Last data filed at 11/28/17 1600   Gross per 24 hour   Intake          3173.33 ml   Output              980 ml   Net          2193.33 ml      Physical Exam   Constitutional: She is oriented to person, place, and time. No distress.   Cardiovascular: Normal rate and regular rhythm.    Pulmonary/Chest: Effort normal. No respiratory distress. She has rales.   Abdominal: There is tenderness.   Musculoskeletal: She exhibits no edema.   Neurological: She is alert and oriented to person, place, and time.   Psychiatric: She has a normal mood and affect. Thought content normal.   Nursing note and vitals reviewed.      Significant Labs:   CMP:   Recent Labs  Lab 11/27/17  0520 11/28/17  0502   * 133*   K 3.6 4.6   CL 99 102   CO2 25 23   * 223*   BUN 26* 26*   CREATININE 0.7 0.9   CALCIUM 8.0* 7.8*   PROT 5.7*  --    ALBUMIN 2.2*  --    BILITOT 0.5  --    ALKPHOS 66  --    AST 25  --    ALT 16  --    ANIONGAP 9 8   EGFRNONAA >60 56*     Cardiac Markers:   Recent Labs  Lab 11/28/17  0502   *       Significant Imaging: no new    Assessment/Plan:      * Intestinal obstruction    S/P ex-lap, POD #1. As per Primary Team        Essential hypertension    SBP . Decrease IV enalaprit from 2.5 mg to 1.25 mg every 6 hours.  PRN hydralazine 10 mg every 8 hours. Monitor.           Aortic stenosis, moderate    Pulmonary hypertension  Discontinued IVF as patient was 10 liters fluid positive. Giving 1 dose of IV furosemide 40 mg for rales on physical exam.  Pt on oxygen via NC which should be weaned as tolerated.            Dementia without behavioral disturbance    Pt alert and oriented.  Good insight into her health problem and plan for the future.  On namenda and aricept at home which are continued here.              VTE Risk Mitigation         Ordered     enoxaparin injection 40 mg  Every 24 hours (non-standard times)     Route:  Subcutaneous         11/20/17 1956     Medium Risk of VTE  Once      11/20/17 1956     Place sequential compression device  Until discontinued      11/20/17 1956          Critical care time spent on the evaluation and treatment of severe organ dysfunction, review of pertinent labs and imaging studies, discussions with consulting providers and discussions with patient/family: 20 minutes.    Livier York PA-C  Department of Hospital Medicine   [facility]  Pager: [phone]

## 2017-11-28 NOTE — PHYSICIAN QUERY
PT Name: Lesa Holder  MR #: [id number]     Physician Query Form - Diagnosis Clarification      CDS/: Kendra Royal               Contact information: [email or web address]    This form is a permanent document in the medical record.     Query Date: November 28, 2017    By submitting this query, we are merely seeking further clarification of documentation.  Please utilize your independent clinical judgment when addressing the question(s) below.     The medical record contains the following:      Findings Supporting Clinical Information Location in Medical Record   Post-operative respiratory failure    Admitted for a SBO s/p ex lap with post-operative respiratory failure. Extubated successfully to NC without issues. Breathing comfortably on exam, reporting no issues. Lungs CTAB.       Respiratory failure s/p ex lap requiring intubation.    Anesthesia wanted to keep her intubated overnight.  No respiratory complaints per chart review.  No hx of lung disease per chart review.  She remains intubated post-procedure for unclear reasons.      Extubated, sats good. Comfortable.  No respiratory distress                                             RR= 26 - 31  O2 Sat on NC @ 2L= % Pulmonology/CCM Consult Note Attestation 11/28          Pulmonology/CCM Consult Note 11/27              General Surgery PN 11/28      VS 11/28 Post Extubation     Please clarify if the ____Post-operative respiratory failure__ diagnosis has been:    [ x ] Ruled In  [  ] Ruled Out  [  ] Clinically undetermined  [  ] Other/Clarification of findings (please specify)_______________________________    Please document in your progress notes daily for the duration of treatment, until resolved, and include in your discharge summary.

## 2017-11-28 NOTE — SUBJECTIVE & OBJECTIVE
Interval History: Pt c/o moderate abdominal pain around surgical site.  Denies CP, SOB.     Review of Systems   Respiratory: Negative for shortness of breath.    Cardiovascular: Negative for chest pain.   Gastrointestinal: Positive for abdominal pain.     Objective:     Vital Signs (Most Recent):  Temp: 99.2 °F (37.3 °C) (11/28/17 1600)  Pulse: 101 (11/28/17 1615)  Resp: (!) 31 (11/28/17 1615)  BP: (!) 118/55 (11/28/17 1600)  SpO2: 97 % (11/28/17 1615) Vital Signs (24h Range):  Temp:  [97.1 °F (36.2 °C)-99.2 °F (37.3 °C)] 99.2 °F (37.3 °C)  Pulse:  [] 101  Resp:  [14-43] 31  SpO2:  [96 %-100 %] 97 %  BP: ()/(52-61) 118/55  Arterial Line BP: (100-154)/(46-69) 134/53     Weight: 74.5 kg (164 lb 3.9 oz)  Body mass index is 29.09 kg/m².    Intake/Output Summary (Last 24 hours) at 11/28/17 1729  Last data filed at 11/28/17 1600   Gross per 24 hour   Intake          3173.33 ml   Output              980 ml   Net          2193.33 ml      Physical Exam   Constitutional: She is oriented to person, place, and time. No distress.   Cardiovascular: Normal rate and regular rhythm.    Pulmonary/Chest: Effort normal. No respiratory distress. She has rales.   Abdominal: There is tenderness.   Musculoskeletal: She exhibits no edema.   Neurological: She is alert and oriented to person, place, and time.   Psychiatric: She has a normal mood and affect. Thought content normal.   Nursing note and vitals reviewed.      Significant Labs:   CMP:   Recent Labs  Lab 11/27/17  0520 11/28/17  0502   * 133*   K 3.6 4.6   CL 99 102   CO2 25 23   * 223*   BUN 26* 26*   CREATININE 0.7 0.9   CALCIUM 8.0* 7.8*   PROT 5.7*  --    ALBUMIN 2.2*  --    BILITOT 0.5  --    ALKPHOS 66  --    AST 25  --    ALT 16  --    ANIONGAP 9 8   EGFRNONAA >60 56*     Cardiac Markers:   Recent Labs  Lab 11/28/17  0502   *       Significant Imaging: no new

## 2017-11-28 NOTE — ASSESSMENT & PLAN NOTE
Pulmonary hypertension  Discontinued IVF as patient was 10 liters fluid positive. Giving 1 dose of IV furosemide 40 mg for rales on physical exam.  Pt on oxygen via NC which should be weaned as tolerated.

## 2017-11-28 NOTE — ANESTHESIA POSTPROCEDURE EVALUATION
"Anesthesia Post Evaluation    Patient: Lesa Holder    Procedure(s) Performed: Procedure(s) (LRB):  LAPAROTOMY-EXPLORATORY (N/A)  RESECTION-SMALL BOWEL    Final Anesthesia Type: general  Patient location during evaluation: ICU  Patient participation: No - Unable to Participate, Intubation  Level of consciousness: sedated  Post-procedure vital signs: reviewed and stable  Pain management: adequate  Airway patency: patent  PONV status at discharge: No PONV  Anesthetic complications: no      Cardiovascular status: stable  Respiratory status: intubated  Hydration status: euvolemic  Follow-up not needed.        Visit Vitals  BP (!) 111/57   Pulse 83   Temp 36.3 °C (97.3 °F) (Axillary)   Resp 16   Ht 5' 3" (1.6 m)   Wt 74.5 kg (164 lb 3.9 oz)   SpO2 99%   Breastfeeding? No   BMI 29.09 kg/m²       Pain/Patience Score: Pain Assessment Performed: Yes (11/28/2017  5:05 AM)  Presence of Pain: non-verbal indicators present (11/28/2017  5:05 AM)  Pain Rating Prior to Med Admin: 0 (11/28/2017 12:04 AM)  Pain Rating Post Med Admin: 0 (11/28/2017 12:34 AM)      "

## 2017-11-29 LAB
ALBUMIN SERPL BCP-MCNC: 1.7 G/DL
ALP SERPL-CCNC: 68 U/L
ALT SERPL W/O P-5'-P-CCNC: 14 U/L
ANION GAP SERPL CALC-SCNC: 5 MMOL/L
ANISOCYTOSIS BLD QL SMEAR: SLIGHT
AST SERPL-CCNC: 17 U/L
BASOPHILS # BLD AUTO: ABNORMAL K/UL
BASOPHILS NFR BLD: 0 %
BILIRUB DIRECT SERPL-MCNC: 0.3 MG/DL
BILIRUB SERPL-MCNC: 0.7 MG/DL
BUN SERPL-MCNC: 23 MG/DL
CALCIUM SERPL-MCNC: 7.9 MG/DL
CHLORIDE SERPL-SCNC: 98 MMOL/L
CO2 SERPL-SCNC: 30 MMOL/L
CREAT SERPL-MCNC: 0.8 MG/DL
DIFFERENTIAL METHOD: ABNORMAL
EOSINOPHIL # BLD AUTO: ABNORMAL K/UL
EOSINOPHIL NFR BLD: 0 %
ERYTHROCYTE [DISTWIDTH] IN BLOOD BY AUTOMATED COUNT: 14.3 %
EST. GFR  (AFRICAN AMERICAN): >60 ML/MIN/1.73 M^2
EST. GFR  (NON AFRICAN AMERICAN): >60 ML/MIN/1.73 M^2
GLUCOSE SERPL-MCNC: 209 MG/DL
HCT VFR BLD AUTO: 31.5 %
HGB BLD-MCNC: 10.5 G/DL
HYPOCHROMIA BLD QL SMEAR: ABNORMAL
LYMPHOCYTES # BLD AUTO: ABNORMAL K/UL
LYMPHOCYTES NFR BLD: 5 %
MAGNESIUM SERPL-MCNC: 1.8 MG/DL
MCH RBC QN AUTO: 29.6 PG
MCHC RBC AUTO-ENTMCNC: 33.3 G/DL
MCV RBC AUTO: 89 FL
MONOCYTES # BLD AUTO: ABNORMAL K/UL
MONOCYTES NFR BLD: 10 %
NEUTROPHILS # BLD AUTO: ABNORMAL K/UL
NEUTROPHILS NFR BLD: 78 %
NEUTS BAND NFR BLD MANUAL: 7 %
NRBC BLD-RTO: 1 /100 WBC
PHOSPHATE SERPL-MCNC: 3.4 MG/DL
PLATELET # BLD AUTO: 283 K/UL
PLATELET BLD QL SMEAR: ABNORMAL
PMV BLD AUTO: 8.4 FL
POTASSIUM SERPL-SCNC: 4 MMOL/L
PROT SERPL-MCNC: 5 G/DL
RBC # BLD AUTO: 3.55 M/UL
SODIUM SERPL-SCNC: 133 MMOL/L
WBC # BLD AUTO: 13.6 K/UL

## 2017-11-29 PROCEDURE — 94799 UNLISTED PULMONARY SVC/PX: CPT

## 2017-11-29 PROCEDURE — 27000221 HC OXYGEN, UP TO 24 HOURS

## 2017-11-29 PROCEDURE — A4216 STERILE WATER/SALINE, 10 ML: HCPCS | Performed by: ANESTHESIOLOGY

## 2017-11-29 PROCEDURE — 25000003 PHARM REV CODE 250: Performed by: PHYSICIAN ASSISTANT

## 2017-11-29 PROCEDURE — 11000001 HC ACUTE MED/SURG PRIVATE ROOM

## 2017-11-29 PROCEDURE — 94761 N-INVAS EAR/PLS OXIMETRY MLT: CPT

## 2017-11-29 PROCEDURE — 85007 BL SMEAR W/DIFF WBC COUNT: CPT

## 2017-11-29 PROCEDURE — A4217 STERILE WATER/SALINE, 500 ML: HCPCS | Performed by: STUDENT IN AN ORGANIZED HEALTH CARE EDUCATION/TRAINING PROGRAM

## 2017-11-29 PROCEDURE — 83735 ASSAY OF MAGNESIUM: CPT

## 2017-11-29 PROCEDURE — 84100 ASSAY OF PHOSPHORUS: CPT

## 2017-11-29 PROCEDURE — 25000242 PHARM REV CODE 250 ALT 637 W/ HCPCS: Performed by: HOSPITALIST

## 2017-11-29 PROCEDURE — 80076 HEPATIC FUNCTION PANEL: CPT

## 2017-11-29 PROCEDURE — 25000003 PHARM REV CODE 250: Performed by: STUDENT IN AN ORGANIZED HEALTH CARE EDUCATION/TRAINING PROGRAM

## 2017-11-29 PROCEDURE — 85027 COMPLETE CBC AUTOMATED: CPT

## 2017-11-29 PROCEDURE — 97530 THERAPEUTIC ACTIVITIES: CPT

## 2017-11-29 PROCEDURE — 25000003 PHARM REV CODE 250: Performed by: ANESTHESIOLOGY

## 2017-11-29 PROCEDURE — 63600175 PHARM REV CODE 636 W HCPCS: Performed by: ANESTHESIOLOGY

## 2017-11-29 PROCEDURE — 25000003 PHARM REV CODE 250: Performed by: HOSPITALIST

## 2017-11-29 PROCEDURE — 80048 BASIC METABOLIC PNL TOTAL CA: CPT

## 2017-11-29 PROCEDURE — 63600175 PHARM REV CODE 636 W HCPCS: Performed by: STUDENT IN AN ORGANIZED HEALTH CARE EDUCATION/TRAINING PROGRAM

## 2017-11-29 PROCEDURE — 94640 AIRWAY INHALATION TREATMENT: CPT

## 2017-11-29 PROCEDURE — 63600175 PHARM REV CODE 636 W HCPCS: Performed by: HOSPITALIST

## 2017-11-29 RX ADMIN — ESCITALOPRAM OXALATE 20 MG: 20 TABLET, FILM COATED ORAL at 08:11

## 2017-11-29 RX ADMIN — ASPIRIN 81 MG 81 MG: 81 TABLET ORAL at 08:11

## 2017-11-29 RX ADMIN — DONEPEZIL HYDROCHLORIDE 10 MG: 5 TABLET, FILM COATED ORAL at 08:11

## 2017-11-29 RX ADMIN — IPRATROPIUM BROMIDE AND ALBUTEROL SULFATE 3 ML: .5; 3 SOLUTION RESPIRATORY (INHALATION) at 08:11

## 2017-11-29 RX ADMIN — CHLORHEXIDINE GLUCONATE 15 ML: 1.2 RINSE ORAL at 09:11

## 2017-11-29 RX ADMIN — ONDANSETRON 4 MG: 2 INJECTION INTRAMUSCULAR; INTRAVENOUS at 10:11

## 2017-11-29 RX ADMIN — FENTANYL CITRATE 50 MCG: 50 INJECTION, SOLUTION INTRAMUSCULAR; INTRAVENOUS at 12:11

## 2017-11-29 RX ADMIN — SODIUM CHLORIDE, PRESERVATIVE FREE 3 ML: 5 INJECTION INTRAVENOUS at 05:11

## 2017-11-29 RX ADMIN — ENALAPRILAT 1.25 MG: 1.25 INJECTION, SOLUTION INTRAVENOUS at 12:11

## 2017-11-29 RX ADMIN — IPRATROPIUM BROMIDE AND ALBUTEROL SULFATE 3 ML: .5; 3 SOLUTION RESPIRATORY (INHALATION) at 12:11

## 2017-11-29 RX ADMIN — METHOCARBAMOL 500 MG: 500 TABLET ORAL at 05:11

## 2017-11-29 RX ADMIN — ENOXAPARIN SODIUM 40 MG: 100 INJECTION SUBCUTANEOUS at 08:11

## 2017-11-29 RX ADMIN — CALCIUM GLUCONATE: 94 INJECTION, SOLUTION INTRAVENOUS at 09:11

## 2017-11-29 RX ADMIN — METHOCARBAMOL 500 MG: 500 TABLET ORAL at 12:11

## 2017-11-29 RX ADMIN — IPRATROPIUM BROMIDE AND ALBUTEROL SULFATE 3 ML: .5; 3 SOLUTION RESPIRATORY (INHALATION) at 07:11

## 2017-11-29 RX ADMIN — SODIUM CHLORIDE, PRESERVATIVE FREE 3 ML: 5 INJECTION INTRAVENOUS at 02:11

## 2017-11-29 RX ADMIN — SODIUM CHLORIDE, PRESERVATIVE FREE 3 ML: 5 INJECTION INTRAVENOUS at 09:11

## 2017-11-29 RX ADMIN — MEMANTINE HYDROCHLORIDE 10 MG: 5 TABLET ORAL at 08:11

## 2017-11-29 RX ADMIN — METHOCARBAMOL 500 MG: 500 TABLET ORAL at 11:11

## 2017-11-29 RX ADMIN — CHLORHEXIDINE GLUCONATE 15 ML: 1.2 RINSE ORAL at 08:11

## 2017-11-29 RX ADMIN — ENALAPRILAT 1.25 MG: 1.25 INJECTION, SOLUTION INTRAVENOUS at 11:11

## 2017-11-29 RX ADMIN — ENALAPRILAT 1.25 MG: 1.25 INJECTION, SOLUTION INTRAVENOUS at 05:11

## 2017-11-29 RX ADMIN — IPRATROPIUM BROMIDE AND ALBUTEROL SULFATE 3 ML: .5; 3 SOLUTION RESPIRATORY (INHALATION) at 02:11

## 2017-11-29 NOTE — PROGRESS NOTES
Progress notes reviewed. Patient remains in ICU.    Plan:  (POD1)S/p ex lap, Charles with SB resection  Continue NPO, TPN  Awaiting return of bowel function. ~800-900 clear greenish output per NG. Hopefully she will open up soon  UOP, creatinine ok  Extubated, sats good. Comfortable.  BP stable, mild tachy around 100. Art line out soon.  Likely to remain in ICU for right now  In restraints right now. Was pulling at NG tube overnight. Will discontinue as soon as safe to do so       Future Appointments  Date Time Provider Department Center   12/6/2017 4:00 PM Pedro Pablo Drake MD [facility] PS[facility]CH [facility]   12/11/2017 9:00 AM Barney Gardner MD [facility] GENSUR [facility]   2/7/2018 2:00 PM Miriam Saunders MD [facility] KARTHIK [place]

## 2017-11-29 NOTE — PROGRESS NOTES
[facility]  General Surgery  Progress Note    Subjective:     History of Present Illness:  No notes on file    Post-Op Info:  Procedure(s) (LRB):  LAPAROTOMY-EXPLORATORY (N/A)  RESECTION-SMALL BOWEL   2 Days Post-Op     Interval History: Overall states that she is feeling a little better  PT/OT working with her in bed  NG in place  Denies flatus  No NV  Got a dose of lasix yesterday, good diuresis      Medications:  Continuous Infusions:   TPN ADULT CENTRAL LINE CUSTOM 75 mL/hr at 17 2200    TPN ADULT CENTRAL LINE CUSTOM       Scheduled Meds:   albuterol-ipratropium 2.5mg-0.5mg/3mL  3 mL Nebulization Q6H    aspirin  81 mg Oral Daily    chlorhexidine  15 mL Mouth/Throat BID    donepezil  10 mg Oral BID    enalaprilat  1.25 mg Intravenous Q6H    enoxaparin  40 mg Subcutaneous Q24H    escitalopram oxalate  20 mg Oral Daily    [START ON 2017] fat emulsion 20%  250 mL Intravenous Every Fri    [START ON 2017] fat emulsion 20%  250 mL Intravenous Every Fri    [START ON 2017] fat emulsion 20%  250 mL Intravenous Every Fri    memantine  10 mg Oral BID    methocarbamol  500 mg Oral QID    sodium chloride 0.9%  3 mL Intravenous Q8H     PRN Meds:acetaminophen, diphenhydrAMINE, [] fentaNYL **FOLLOWED BY** fentaNYL, hydrALAZINE, influenza, ondansetron, ondansetron, ramelteon, sodium chloride 0.9%     Review of patient's allergies indicates:  No Known Allergies  Objective:     Vital Signs (Most Recent):  Temp: 99.7 °F (37.6 °C) (17)  Pulse: 109 (17)  Resp: (!) 24 (17)  BP: (!) 106/56 (17)  SpO2: 97 % (17) Vital Signs (24h Range):  Temp:  [97.8 °F (36.6 °C)-99.7 °F (37.6 °C)] 99.7 °F (37.6 °C)  Pulse:  [] 109  Resp:  [22-33] 24  SpO2:  [95 %-98 %] 97 %  BP: (103-131)/(52-66) 106/56  Arterial Line BP: (127-152)/(48-69) 134/53     Weight: 74.6 kg (164 lb 7.4 oz)  Body mass index is 29.13 kg/m².    Intake/Output -  Last 3 Shifts       11/27 0700 - 11/28 0659 11/28 0700 - 11/29 0659 11/29 0700 - 11/30 0659    P.O.       I.V. (mL/kg) 2312.5 (31) 375.8 (5)     NG/ 120 40    IV Piggyback  250      1815 210    Total Intake(mL/kg) 2907.5 (39) 2560.8 (34.3) 250 (3.4)    Urine (mL/kg/hr) 975 (0.5) 3645 (2) 210 (1)    Emesis/NG output 250 (0.1)      Drains 625 (0.3) 250 (0.1)     Stool  0 (0)     Total Output 1850 3895 210    Net +1057.5 -1334.2 +40                 Physical Exam   Pulmonary/Chest: Effort normal. No respiratory distress.   Abdominal: Soft. She exhibits distension.   Appropriately TTP  Incision looks good, CDI     Good urine output  250 recorded out of NG tube, clear/very light green      Significant Labs:  CBC:   Recent Labs  Lab 11/29/17  0558   WBC 13.60*   RBC 3.55*   HGB 10.5*   HCT 31.5*      MCV 89   MCH 29.6   MCHC 33.3     BMP:   Recent Labs  Lab 11/29/17  0558   *   *   K 4.0   CL 98   CO2 30*   BUN 23   CREATININE 0.8   CALCIUM 7.9*   MG 1.8       Significant Diagnostics:  I have reviewed all pertinent imaging results/findings within the past 24 hours.    Assessment/Plan:     * Intestinal obstruction    S/p ex lap, Charles with SBR POD2  Continue NPO, TPN  NG tube output much decreased, more clear. Will leave for right now given still slightly distended, no flatus  UOP, creatinine ok. Got lasix for diuresis, good response  BP stable, mild tachy around 100.  Transfer to floor today              Barney Gardner MD  General Surgery  [facility]

## 2017-11-29 NOTE — NURSING TRANSFER
Nursing Transfer Note      11/29/2017     Transfer To: room 503    Transfer via bed    Transfer with 2L NC to O2    Transported by Marcia Case RN and transporter    Medicines sent: chloraseptic spray    Chart send with patient: Yes    Notified: spouse    Patient reassessed at: 11/29/17  1245    Upon arrival to floor: cardiac monitor applied, patient oriented to room, call bell in reach and bed in lowest position

## 2017-11-29 NOTE — PHYSICIAN QUERY
"PT Name: Lesa Holder  MR #: [id number]    Physician Query Form - Heart  Condition Clarification     CDS/: Kendra Royal               Contact information: [email or web address]  This form is a permanent document in the medical record.     Query Date: November 29, 2017    By submitting this query, we are merely seeking further clarification of documentation. Please utilize your independent clinical judgment when addressing the question(s) below.    The medical record contains the following   Indicators     Supporting Clinical Findings Location in Medical Record   x BNP BNP= 158 Labs 11/28    EF     x Radiology findings The cardiomediastinal silhouette is stable in configuration.  There is obscuration of the right costophrenic angle suggesting effusion, worsened.   Patchy increased interstitial and parenchymal attenuation bilaterally, similar, suggesting edema.  CXR 11/27   x Echo Results Normal left ventricular systolic function (EF 55-60%).   Normal right ventricular systolic function  Echo 11/27    "Ascites" documented      "SOB" or "VILALRREAL" documented      "Hypoxia" documented     x Heart Failure documented  She has congestive heart failure with preserved ejection fraction, which is iatrogenic.  Hospital Medicine PN Attestation 11/29    "Edema" documented     x Diuretics/Meds Give IV furosemide.  Repeat if still unable to wean off supplemental oxygen.      giving 1 dose of IV furosemide for volume overload.   Hospital Medicine PN Attestation 11/29      [facility] PN 11/28    Treatment:     x Other:  Fluid status net positive more than 9 liters, on IV fluids.     Aortic stenosis, moderate Pulmonary hypertension  10 liters fluid positive Hospital Medicine PN Attestation 11/29    [facility] 11/28       Provider, please specify diagnosis or diagnoses associated with above clinical findings.                               [  ] Acute Diastolic Heart Failure ( EF > 40)*  [ x ] Acute on Chronic " Diastolic Heart Failure( EF > 40)*  [  ] Chronic Diastolic Heart Failure (EF > 40)*  [  ] Other Type of Heart Failure (please specify type): _________________________  [  ] Heart Failure Ruled Out  [  ] Other (please specify): ___________________________________  [  ] Clinically Undetermined            *American Heart Association                                                                                                          Please document in your progress notes daily for the duration of treatment until resolved and include in your discharge summary.

## 2017-11-29 NOTE — ASSESSMENT & PLAN NOTE
S/p ex lap, Charles with SBR POD2  Continue NPO, TPN  NG tube output much decreased, more clear. Will leave for right now given still slightly distended, no flatus  UOP, creatinine ok. Got lasix for diuresis, good response  BP stable, mild tachy around 100.  Transfer to floor today

## 2017-11-29 NOTE — PROGRESS NOTES
Patient transferred to floor from ICU    Patient POD 2 of SBR. NG tube in place, TPN, pt/ot to continue. TN to follow up in am.      Future Appointments  Date Time Provider Department Center   12/6/2017 4:00 PM Pedro Pablo Drake MD [facility]   12/11/2017 9:00 AM Barney Gardner MD [place] [facility]R [facility]   2/7/2018 2:00 PM Miriam Saunders MD [facility] [place]

## 2017-11-29 NOTE — PLAN OF CARE
Problem: Patient Care Overview  Goal: Plan of Care Review  Outcome: Ongoing (interventions implemented as appropriate)  Pt tolerating NGT and has had minimal pain complaints this shift. Abdomen soft tender + BS. Remains AAO, VSS, NAD noted. Will continue to monitor patient.

## 2017-11-29 NOTE — SUBJECTIVE & OBJECTIVE
Interval History: Overall states that she is feeling a little better  PT/OT working with her in bed  NG in place  Denies flatus  No NV  Got a dose of lasix yesterday, good diuresis      Medications:  Continuous Infusions:   TPN ADULT CENTRAL LINE CUSTOM 75 mL/hr at 17 2200    TPN ADULT CENTRAL LINE CUSTOM       Scheduled Meds:   albuterol-ipratropium 2.5mg-0.5mg/3mL  3 mL Nebulization Q6H    aspirin  81 mg Oral Daily    chlorhexidine  15 mL Mouth/Throat BID    donepezil  10 mg Oral BID    enalaprilat  1.25 mg Intravenous Q6H    enoxaparin  40 mg Subcutaneous Q24H    escitalopram oxalate  20 mg Oral Daily    [START ON 2017] fat emulsion 20%  250 mL Intravenous Every Fri    [START ON 2017] fat emulsion 20%  250 mL Intravenous Every Fri    [START ON 2017] fat emulsion 20%  250 mL Intravenous Every Fri    memantine  10 mg Oral BID    methocarbamol  500 mg Oral QID    sodium chloride 0.9%  3 mL Intravenous Q8H     PRN Meds:acetaminophen, diphenhydrAMINE, [] fentaNYL **FOLLOWED BY** fentaNYL, hydrALAZINE, influenza, ondansetron, ondansetron, ramelteon, sodium chloride 0.9%     Review of patient's allergies indicates:  No Known Allergies  Objective:     Vital Signs (Most Recent):  Temp: 99.7 °F (37.6 °C) (17)  Pulse: 109 (17)  Resp: (!) 24 (17)  BP: (!) 106/56 (17)  SpO2: 97 % (17) Vital Signs (24h Range):  Temp:  [97.8 °F (36.6 °C)-99.7 °F (37.6 °C)] 99.7 °F (37.6 °C)  Pulse:  [] 109  Resp:  [22-33] 24  SpO2:  [95 %-98 %] 97 %  BP: (103-131)/(52-66) 106/56  Arterial Line BP: (127-152)/(48-69) 134/53     Weight: 74.6 kg (164 lb 7.4 oz)  Body mass index is 29.13 kg/m².    Intake/Output - Last 3 Shifts       700 -  06 -  06 -  0659    P.O.       I.V. (mL/kg) 2312.5 (31) 375.8 (5)     NG/ 120 40    IV Piggyback  250      1815 210    Total Intake(mL/kg) 2907.5 (39)  2560.8 (34.3) 250 (3.4)    Urine (mL/kg/hr) 975 (0.5) 3645 (2) 210 (1)    Emesis/NG output 250 (0.1)      Drains 625 (0.3) 250 (0.1)     Stool  0 (0)     Total Output 1850 3895 210    Net +1057.5 -1334.2 +40                 Physical Exam   Pulmonary/Chest: Effort normal. No respiratory distress.   Abdominal: Soft. She exhibits distension.   Appropriately TTP  Incision looks good, CDI     Good urine output  250 recorded out of NG tube, clear/very light green      Significant Labs:  CBC:   Recent Labs  Lab 11/29/17  0558   WBC 13.60*   RBC 3.55*   HGB 10.5*   HCT 31.5*      MCV 89   MCH 29.6   MCHC 33.3     BMP:   Recent Labs  Lab 11/29/17  0558   *   *   K 4.0   CL 98   CO2 30*   BUN 23   CREATININE 0.8   CALCIUM 7.9*   MG 1.8       Significant Diagnostics:  I have reviewed all pertinent imaging results/findings within the past 24 hours.

## 2017-11-29 NOTE — PHYSICIAN QUERY
PT Name: Lesa Holder  MR #: [id number]    Physician Query Form - Postoperative Relationship Clarification     CDS/: Kendra Royal               Contact information: [email or web address]    This form is a permanent document in the medical record.     Query Date: November 29, 2017    Dear Provider,    By submitting this query, we are merely seeking further clarification of documentation. Please utilize your independent clinical judgment when addressing the question(s) below.    The Medical Record contains the following:    Supporting Clinical Findings   Location in Medical Record   Admitted for a SBO s/p ex lap with post-operative respiratory failure.  Pulmonology/Critical Care Medicine Consult Note Attestation 11/28       Please clarify the term post operative   [ x ] Condition occurred in the post operative period (not a complication of surgery)   [  ] Condition is a complication of surgery   [  ] Other intended meaning (please specify) ____________________________

## 2017-11-29 NOTE — PLAN OF CARE
Problem: Patient Care Overview  Goal: Plan of Care Review  Outcome: Ongoing (interventions implemented as appropriate)  Plan of care discussed with patient and spouse.  Verbalized understanding.  Pt and awake and alert.  Sleeping between care.  Disoriented to specific hospital and time.  On bedrest with 1 person assist to bedside commode.  Due to void by 5pm post catheter removal.  Midline incision to abdomen is CDI with staples in place.  Pt denying pain, N/V/discomfort.  NG tube to low continuous suction.  Pt resting comfortably. Will continue to monitor.

## 2017-11-29 NOTE — PLAN OF CARE
Problem: Patient Care Overview  Goal: Plan of Care Review  Pt received on documented flow, no respiratory distress noted. Will cont to monitor.

## 2017-11-30 LAB
ANION GAP SERPL CALC-SCNC: 8 MMOL/L
BASOPHILS # BLD AUTO: 0.02 K/UL
BASOPHILS NFR BLD: 0.1 %
BUN SERPL-MCNC: 24 MG/DL
CALCIUM SERPL-MCNC: 8 MG/DL
CHLORIDE SERPL-SCNC: 97 MMOL/L
CO2 SERPL-SCNC: 28 MMOL/L
CREAT SERPL-MCNC: 0.7 MG/DL
DIFFERENTIAL METHOD: ABNORMAL
EOSINOPHIL # BLD AUTO: 0.1 K/UL
EOSINOPHIL NFR BLD: 0.4 %
ERYTHROCYTE [DISTWIDTH] IN BLOOD BY AUTOMATED COUNT: 14.1 %
EST. GFR  (AFRICAN AMERICAN): >60 ML/MIN/1.73 M^2
EST. GFR  (NON AFRICAN AMERICAN): >60 ML/MIN/1.73 M^2
GLUCOSE SERPL-MCNC: 172 MG/DL
HCT VFR BLD AUTO: 30.7 %
HGB BLD-MCNC: 10.1 G/DL
LYMPHOCYTES # BLD AUTO: 1.3 K/UL
LYMPHOCYTES NFR BLD: 8.3 %
MAGNESIUM SERPL-MCNC: 2 MG/DL
MCH RBC QN AUTO: 29.2 PG
MCHC RBC AUTO-ENTMCNC: 32.9 G/DL
MCV RBC AUTO: 89 FL
MONOCYTES # BLD AUTO: 1.7 K/UL
MONOCYTES NFR BLD: 11 %
NEUTROPHILS # BLD AUTO: 12.4 K/UL
NEUTROPHILS NFR BLD: 79.5 %
PHOSPHATE SERPL-MCNC: 3.5 MG/DL
PLATELET # BLD AUTO: 299 K/UL
PMV BLD AUTO: 8.3 FL
POTASSIUM SERPL-SCNC: 3.9 MMOL/L
RBC # BLD AUTO: 3.46 M/UL
SODIUM SERPL-SCNC: 133 MMOL/L
WBC # BLD AUTO: 15.57 K/UL

## 2017-11-30 PROCEDURE — G8988 SELF CARE GOAL STATUS: HCPCS | Mod: CJ

## 2017-11-30 PROCEDURE — 85025 COMPLETE CBC W/AUTO DIFF WBC: CPT

## 2017-11-30 PROCEDURE — 25000003 PHARM REV CODE 250: Performed by: HOSPITALIST

## 2017-11-30 PROCEDURE — 11000001 HC ACUTE MED/SURG PRIVATE ROOM

## 2017-11-30 PROCEDURE — G8987 SELF CARE CURRENT STATUS: HCPCS | Mod: CK

## 2017-11-30 PROCEDURE — 97110 THERAPEUTIC EXERCISES: CPT

## 2017-11-30 PROCEDURE — 99900035 HC TECH TIME PER 15 MIN (STAT)

## 2017-11-30 PROCEDURE — 97803 MED NUTRITION INDIV SUBSEQ: CPT

## 2017-11-30 PROCEDURE — 25000242 PHARM REV CODE 250 ALT 637 W/ HCPCS: Performed by: HOSPITALIST

## 2017-11-30 PROCEDURE — 80048 BASIC METABOLIC PNL TOTAL CA: CPT

## 2017-11-30 PROCEDURE — 25000003 PHARM REV CODE 250: Performed by: PHYSICIAN ASSISTANT

## 2017-11-30 PROCEDURE — 83735 ASSAY OF MAGNESIUM: CPT

## 2017-11-30 PROCEDURE — 94640 AIRWAY INHALATION TREATMENT: CPT

## 2017-11-30 PROCEDURE — 94761 N-INVAS EAR/PLS OXIMETRY MLT: CPT

## 2017-11-30 PROCEDURE — 97166 OT EVAL MOD COMPLEX 45 MIN: CPT

## 2017-11-30 PROCEDURE — 63600175 PHARM REV CODE 636 W HCPCS: Performed by: STUDENT IN AN ORGANIZED HEALTH CARE EDUCATION/TRAINING PROGRAM

## 2017-11-30 PROCEDURE — 97530 THERAPEUTIC ACTIVITIES: CPT

## 2017-11-30 PROCEDURE — 94799 UNLISTED PULMONARY SVC/PX: CPT

## 2017-11-30 PROCEDURE — 25000003 PHARM REV CODE 250: Performed by: STUDENT IN AN ORGANIZED HEALTH CARE EDUCATION/TRAINING PROGRAM

## 2017-11-30 PROCEDURE — 27000221 HC OXYGEN, UP TO 24 HOURS

## 2017-11-30 PROCEDURE — 84100 ASSAY OF PHOSPHORUS: CPT

## 2017-11-30 RX ADMIN — MEMANTINE HYDROCHLORIDE 10 MG: 5 TABLET ORAL at 09:11

## 2017-11-30 RX ADMIN — DONEPEZIL HYDROCHLORIDE 10 MG: 5 TABLET, FILM COATED ORAL at 09:11

## 2017-11-30 RX ADMIN — IPRATROPIUM BROMIDE AND ALBUTEROL SULFATE 3 ML: .5; 3 SOLUTION RESPIRATORY (INHALATION) at 12:11

## 2017-11-30 RX ADMIN — METHOCARBAMOL 500 MG: 500 TABLET ORAL at 05:11

## 2017-11-30 RX ADMIN — IPRATROPIUM BROMIDE AND ALBUTEROL SULFATE 3 ML: .5; 3 SOLUTION RESPIRATORY (INHALATION) at 01:11

## 2017-11-30 RX ADMIN — ENALAPRILAT 1.25 MG: 1.25 INJECTION, SOLUTION INTRAVENOUS at 06:11

## 2017-11-30 RX ADMIN — CHLORHEXIDINE GLUCONATE 15 ML: 1.2 RINSE ORAL at 09:11

## 2017-11-30 RX ADMIN — ACETAMINOPHEN 650 MG: 325 TABLET ORAL at 10:11

## 2017-11-30 RX ADMIN — ENOXAPARIN SODIUM 40 MG: 100 INJECTION SUBCUTANEOUS at 09:11

## 2017-11-30 RX ADMIN — METHOCARBAMOL 500 MG: 500 TABLET ORAL at 11:11

## 2017-11-30 RX ADMIN — IPRATROPIUM BROMIDE AND ALBUTEROL SULFATE 3 ML: .5; 3 SOLUTION RESPIRATORY (INHALATION) at 08:11

## 2017-11-30 RX ADMIN — METHOCARBAMOL 500 MG: 500 TABLET ORAL at 06:11

## 2017-11-30 RX ADMIN — LEUCINE, PHENYLALANINE, LYSINE, METHIONINE, ISOLEUCINE, VALINE, HISTIDINE, THREONINE, TRYPTOPHAN, ALANINE, GLYCINE, ARGININE, PROLINE, SERINE, TYROSINE, SODIUM ACETATE, DIBASIC POTASSIUM PHOSPHATE, MAGNESIUM CHLORIDE, SODIUM CHLORIDE, CALCIUM CHLORIDE, DEXTROSE
365; 280; 290; 200; 300; 290; 240; 210; 90; 1035; 515; 575; 340; 250; 20; 340; 261; 51; 59; 33; 20 INJECTION INTRAVENOUS at 09:11

## 2017-11-30 RX ADMIN — IPRATROPIUM BROMIDE AND ALBUTEROL SULFATE 3 ML: .5; 3 SOLUTION RESPIRATORY (INHALATION) at 07:11

## 2017-11-30 RX ADMIN — ESCITALOPRAM OXALATE 20 MG: 20 TABLET, FILM COATED ORAL at 09:11

## 2017-11-30 RX ADMIN — ENALAPRILAT 1.25 MG: 1.25 INJECTION, SOLUTION INTRAVENOUS at 05:11

## 2017-11-30 RX ADMIN — ENALAPRILAT 1.25 MG: 1.25 INJECTION, SOLUTION INTRAVENOUS at 11:11

## 2017-11-30 RX ADMIN — ASPIRIN 81 MG 81 MG: 81 TABLET ORAL at 09:11

## 2017-11-30 NOTE — SUBJECTIVE & OBJECTIVE
Interval History:   Feels better today, passed some flatus, no NV  No NV  Pain moderately controlled  Afebrile  NG in place with 250 out, light    Medications:  Continuous Infusions:   Amino acid 5% - dextrose 20% (CLINIMIX-E) solution with additives (1L provides 50 gm AA, 200 gm CHO (680 kcal/L dextrose), Na 35, K 30, Mg 5, Ca 4.5, Acetate 80, Cl 39, Phos 15)      TPN ADULT CENTRAL LINE CUSTOM 75 mL/hr at 11/29/17 2104     Scheduled Meds:   albuterol-ipratropium 2.5mg-0.5mg/3mL  3 mL Nebulization Q6H    aspirin  81 mg Oral Daily    chlorhexidine  15 mL Mouth/Throat BID    donepezil  10 mg Oral BID    enalaprilat  1.25 mg Intravenous Q6H    enoxaparin  40 mg Subcutaneous Q24H    escitalopram oxalate  20 mg Oral Daily    [START ON 12/1/2017] fat emulsion 20%  250 mL Intravenous Every Fri    memantine  10 mg Oral BID    methocarbamol  500 mg Oral QID     PRN Meds:acetaminophen, diphenhydrAMINE, hydrALAZINE, influenza, ondansetron, ramelteon, sodium chloride 0.9%     Review of patient's allergies indicates:  No Known Allergies  Objective:     Vital Signs (Most Recent):  Temp: 97.9 °F (36.6 °C) (11/30/17 1225)  Pulse: 93 (11/30/17 1324)  Resp: 18 (11/30/17 1324)  BP: 132/66 (11/30/17 1225)  SpO2: 98 % (11/30/17 1324) Vital Signs (24h Range):  Temp:  [97.5 °F (36.4 °C)-99.1 °F (37.3 °C)] 97.9 °F (36.6 °C)  Pulse:  [] 93  Resp:  [17-22] 18  SpO2:  [98 %-99 %] 98 %  BP: (113-157)/(58-72) 132/66     Weight: 74.6 kg (164 lb 7.4 oz)  Body mass index is 29.13 kg/m².    Intake/Output - Last 3 Shifts       11/28 0700 - 11/29 0659 11/29 0700 - 11/30 0659 11/30 0700 - 12/01 0659    I.V. (mL/kg) 375.8 (5)      NG/ 40     IV Piggyback 250      TPN 1815 885     Total Intake(mL/kg) 2560.8 (34.3) 925 (12.4)     Urine (mL/kg/hr) 3645 (2) 685 (0.4)     Emesis/NG output       Drains 250 (0.1) 250 (0.1)     Stool 0 (0)      Total Output 3895 935      Net -1334.2 -10                   Physical Exam   Constitutional:  She appears well-nourished. No distress.   HENT:   Head: Atraumatic.   Cardiovascular: Normal rate and regular rhythm.    Pulmonary/Chest: No respiratory distress.   Abdominal: Soft. She exhibits no distension. There is tenderness.       Significant Labs:  CBC:   Recent Labs  Lab 11/30/17  0625   WBC 15.57*   RBC 3.46*   HGB 10.1*   HCT 30.7*      MCV 89   MCH 29.2   MCHC 32.9     BMP:   Recent Labs  Lab 11/30/17  0625   *   *   K 3.9   CL 97   CO2 28   BUN 24*   CREATININE 0.7   CALCIUM 8.0*   MG 2.0       Significant Diagnostics:  I have reviewed all pertinent imaging results/findings within the past 24 hours.

## 2017-11-30 NOTE — PROGRESS NOTES
made phone contact with long term care access service to complete a level of care eligibility tool (LOCET).  They were experiencing a high volume of calls and will follow up with social work.

## 2017-11-30 NOTE — PT/OT/SLP EVAL
Occupational Therapy  Evaluation/Treatment    Lesa Holder   MRN: [id number]   Admitting Diagnosis: Intestinal obstruction    OT Date of Treatment: 11/30/17   OT Start Time: 1057  OT Stop Time: 1115  OT Total Time (min): 18 min    Billable Minutes:  Evaluation 18  Total Time 18    Diagnosis: Intestinal obstruction ; s/p exp lap  The primary encounter diagnosis was Bowel obstruction. Diagnoses of Intestinal obstruction, unspecified cause, unspecified whether partial or complete, Elevated troponin, Abnormal EKG, and Intestinal obstruction were also pertinent to this visit.      Past Medical History:   Diagnosis Date    Cataract 1998    remove from left eye    Dementia     Depression     GERD (gastroesophageal reflux disease)     Hypertension       Past Surgical History:   Procedure Laterality Date    HYSTERECTOMY         Referring physician: Jj  Date referred to OT: 11/28/2017    General Precautions: Standard, fall, NPO  Orthopedic Precautions: N/A  Braces: N/A    Do you have any cultural, spiritual, Restoration conflicts, given your current situation?: na     Patient History:  Living Environment  Lives With: spouse  Living Arrangements: house  Living Environment Comment: Lives with spouse in [facility] with NSTE. Mainly uses jacuzzi tub but also has WIS with shower chair. Uses SPC for community distances only 2* to R knee pain. Pt denies and recent falls/near falls. Pt reports being indep with all ADLs and IADLs  Equipment Currently Used at Home: bedside commode, walker, rolling, shower chair, grab bar    Prior level of function:   Bed Mobility/Transfers: needs device  Grooming: independent  Bathing: needs device  Upper Body Dressing: independent  Lower Body Dressing: independent  Toileting: needs device  Home Management Skills: needs assist  Homemaking Responsibilities: Yes  Meal Prep Responsibility: Secondary  Laundry Responsibility: Secondary  Cleaning Responsibility: Secondary  Bill Paying/Finance  Responsibility: Secondary  Shopping Responsibility: Secondary  Driving License: No  Mode of Transportation: Family     Dominant hand: right    Subjective:  Communicated with nurse prior to session.    Chief Complaint: pain arms and legs; found sitting up in BS chair asking to be put back to bed.   Patient/Family stated goals: none    Pain/Comfort  Pain Rating 1: 10/10  Location - Side 1: Bilateral  Location - Orientation 1: generalized  Location 1: arm  Pain Addressed 1: Reposition  Pain Rating Post-Intervention 1: 0/10  Pain Rating 2: 5/10  Location - Side 2: Bilateral  Location - Orientation 2: generalized  Location 2: leg  Pain Addressed 2: Reposition, Cessation of Activity, Nurse notified  Pain Rating Post-Intervention 2: 5/10    Objective:  Patient found with: NG tube, telemetry, peripheral IV, oxygen    Cognitive Exam:  Oriented to: Person and Place  Follows Commands/attention: Follows one-step commands  Communication: clear/fluent  Memory:  Poor immediate recall  Safety awareness/insight to disability: impaired  Coping skills/emotional control: Appropriate to situation    Visual/perceptual:  Intact    Physical Exam:  Postural examination/scapula alignment: Rounded shoulder and Head forward  Skin integrity: Visible skin intact  Edema: None noted BUE    Sensation:   Intact    Upper Extremity Range of Motion:  Right Upper Extremity: WFL except R shld ~90  Left Upper Extremity: same as above    Upper Extremity Strength:  Right Upper Extremity: Deficits: shld 3-/5; distally 4-/5  Left Upper Extremity: same as above   Strength: 4-/5; arthritic hand stiffness    Fine motor coordination:   Impaired  Left hand, manipulation of objects mild and Right hand, manipulation of objects mild    Gross motor coordination: WFL    Functional Mobility:  Bed Mobility:  Scooting/Bridging: Minimum Assistance  Sit to Supine: Minimum Assistance    Transfers:  Sit <> Stand Assistance: Minimum Assistance  Sit <> Stand Assistive  "Device: Rolling Walker  Bed <> Chair Technique: Stand Pivot  Bed <> Chair Transfer Assistance: Minimum Assistance  Bed <> Chair Assistive Device: Rolling Walker    Functional Ambulation: min assist with RW short distance bed <>chair    Activities of Daily Living:  Feeding Level of Assistance: Activity did not occur (NG tube; NPO)  LE Dressing Level of Assistance: Total assistance (socks)  Grooming Position: Seated  Grooming Level of Assistance: Supervision (oral hygiene with swab)  Toileting Where Assessed:  (declined)       Balance:   Static Sit: FAIR+: Able to take MINIMAL challenges from all directions  Dynamic Sit: FAIR: Cannot move trunk without losing balance  Static Stand: POOR+: Needs MINIMAL assist to maintain  Dynamic stand: POOR: N/A    Therapeutic Activities and Exercises:  Role of OT, POC, AAROM x 5 reps and isometric hold and slow reversal x 3 reps each shld for strengthening seated EOB.  Instructed in single arm raises HEP while  In bed.     AM-PAC 6 CLICK ADL  How much help from another person does this patient currently need?  1 = Unable, Total/Dependent Assistance  2 = A lot, Maximum/Moderate Assistance  3 = A little, Minimum/Contact Guard/Supervision  4 = None, Modified [place]/Independent    Putting on and taking off regular lower body clothing? : 2  Bathing (including washing, rinsing, drying)?: 2  Toileting, which includes using toilet, bedpan, or urinal? : 2  Putting on and taking off regular upper body clothing?: 3  Taking care of personal grooming such as brushing teeth?: 3  Eating meals?: 4  Total Score: 16    AM-PAC Raw Score CMS "G-Code Modifier Level of Impairment Assistance   6 % Total / Unable   7 - 9 CM 80 - 100% Maximal Assist   10-14 CL 60 - 80% Moderate Assist   15 - 19 CK 40 - 60% Moderate Assist   20 - 22 CJ 20 - 40% Minimal Assist   23 CI 1-20% SBA / CGA   24 CH 0% Independent/ Mod I       Patient left HOB elevated with all lines intact, call button in reach, bed " alarm on, nurse notified and  present    Assessment:  Lesa Holder is a 89 y.o. female with a medical diagnosis of Intestinal obstruction and presents with s/p exp lap;  Rehab identified problem list/impairments: Rehab identified problem list/impairments: weakness, impaired functional mobilty, impaired balance, gait instability, impaired self care skills, impaired endurance, pain, decreased safety awareness, decreased upper extremity function, decreased lower extremity function, impaired joint extensibility, impaired coordination. Pt. Would benefit from OT to address deficits. Recommend SNF at discharge. Continue with OT 5 x week.    Rehab potential is good.    Activity tolerance: Poor    Discharge recommendations: Discharge Facility/Level Of Care Needs: nursing facility, skilled     Barriers to discharge: Barriers to Discharge: Decreased caregiver support    Equipment recommendations:  (pt. has BSC, SPC, RW, grab bars, shower chair at home)     GOALS:    Occupational Therapy Goals        Problem: Occupational Therapy Goal    Goal Priority Disciplines Outcome Interventions   Occupational Therapy Goal     OT, PT/OT Ongoing (interventions implemented as appropriate)    Description:  Goals to be met by: 12/10/2017    Patient will increase functional independence with ADLs by performing:    UE Dressing with Set-up Assistance.  LE Dressing with Minimal Assistance with adaptive devices  Grooming while seated at sink with Supervision.  Toileting from bedside commode with Supervision for hygiene and clothing management.   Supine to sit with Modified [place].  Stand pivot transfers with Supervision.  Toilet transfer to bedside commode with Supervision.  Increased functional strength to 4/5  for BUE  Upper extremity exercise program 10 reps per handout, with supervision.                      PLAN:  Patient to be seen 5 x/week to address the above listed problems via self-care/home management, therapeutic  activities, therapeutic exercises  Plan of Care expires: 12/30/17  Plan of Care reviewed with: patient, spouse    OT G-codes  Functional Assessment Tool Used: Am-PAC  Score: 16  Functional Limitation: Self care  Self Care Current Status (): CK  Self Care Goal Status (): ANDREW Villalba OT  11/30/2017

## 2017-11-30 NOTE — PROGRESS NOTES
[facility]  General Surgery  Progress Note    Subjective:     History of Present Illness:  No notes on file    Post-Op Info:  Procedure(s) (LRB):  LAPAROTOMY-EXPLORATORY (N/A)  RESECTION-SMALL BOWEL   3 Days Post-Op     Interval History:   Feels better today, passed some flatus, no NV  No NV  Pain moderately controlled  Afebrile  NG in place with 250 out, light    Medications:  Continuous Infusions:   Amino acid 5% - dextrose 20% (CLINIMIX-E) solution with additives (1L provides 50 gm AA, 200 gm CHO (680 kcal/L dextrose), Na 35, K 30, Mg 5, Ca 4.5, Acetate 80, Cl 39, Phos 15)      TPN ADULT CENTRAL LINE CUSTOM 75 mL/hr at 11/29/17 2104     Scheduled Meds:   albuterol-ipratropium 2.5mg-0.5mg/3mL  3 mL Nebulization Q6H    aspirin  81 mg Oral Daily    chlorhexidine  15 mL Mouth/Throat BID    donepezil  10 mg Oral BID    enalaprilat  1.25 mg Intravenous Q6H    enoxaparin  40 mg Subcutaneous Q24H    escitalopram oxalate  20 mg Oral Daily    [START ON 12/1/2017] fat emulsion 20%  250 mL Intravenous Every Fri    memantine  10 mg Oral BID    methocarbamol  500 mg Oral QID     PRN Meds:acetaminophen, diphenhydrAMINE, hydrALAZINE, influenza, ondansetron, ramelteon, sodium chloride 0.9%     Review of patient's allergies indicates:  No Known Allergies  Objective:     Vital Signs (Most Recent):  Temp: 97.9 °F (36.6 °C) (11/30/17 1225)  Pulse: 93 (11/30/17 1324)  Resp: 18 (11/30/17 1324)  BP: 132/66 (11/30/17 1225)  SpO2: 98 % (11/30/17 1324) Vital Signs (24h Range):  Temp:  [97.5 °F (36.4 °C)-99.1 °F (37.3 °C)] 97.9 °F (36.6 °C)  Pulse:  [] 93  Resp:  [17-22] 18  SpO2:  [98 %-99 %] 98 %  BP: (113-157)/(58-72) 132/66     Weight: 74.6 kg (164 lb 7.4 oz)  Body mass index is 29.13 kg/m².    Intake/Output - Last 3 Shifts       11/28 0700 - 11/29 0659 11/29 0700 - 11/30 0659 11/30 0700 - 12/01 0659    I.V. (mL/kg) 375.8 (5)      NG/ 40     IV Piggyback 250      TPN 1754 235     Total  Intake(mL/kg) 2560.8 (34.3) 925 (12.4)     Urine (mL/kg/hr) 3645 (2) 685 (0.4)     Emesis/NG output       Drains 250 (0.1) 250 (0.1)     Stool 0 (0)      Total Output 3895 935      Net -1334.2 -10                   Physical Exam   Constitutional: She appears well-nourished. No distress.   HENT:   Head: Atraumatic.   Cardiovascular: Normal rate and regular rhythm.    Pulmonary/Chest: No respiratory distress.   Abdominal: Soft. She exhibits no distension. There is tenderness.       Significant Labs:  CBC:   Recent Labs  Lab 11/30/17  0625   WBC 15.57*   RBC 3.46*   HGB 10.1*   HCT 30.7*      MCV 89   MCH 29.2   MCHC 32.9     BMP:   Recent Labs  Lab 11/30/17  0625   *   *   K 3.9   CL 97   CO2 28   BUN 24*   CREATININE 0.7   CALCIUM 8.0*   MG 2.0       Significant Diagnostics:  I have reviewed all pertinent imaging results/findings within the past 24 hours.    Assessment/Plan:     * Intestinal obstruction    S/p ex lap, Charles with SBR POD3  Will DC NG tube, start clears  Continue TPN for now  UOP, creatinine ok. Got lasix for diuresis, good response  BP stable, mild tachy around 100.                Barney Gardner MD  General Surgery  [facility]

## 2017-11-30 NOTE — PLAN OF CARE
Problem: Physical Therapy Goal  Goal: Physical Therapy Goal  Goals to be met by: 2017  Patient will increase functional independence with mobility by performin. Supine <> sit with [place]  2. Sit <> stand transfer with Modified [place] with/without RW  3. Gait  x 150 feet with Modified [place] using Rolling Walker.       Outcome: Ongoing (interventions implemented as appropriate)  Patient with limited tolerance to activity today; required maxA sup to sit toward EOB; sit to half stand with [place]ie; modA to stand pivot transfer bed<> BSC; sit to supine with maxA; pt with fatigue and increased SOB with activity; post activity, /56  O2 sats 100% with 2L O2; informed nurse of status; cont with POC.

## 2017-11-30 NOTE — PROGRESS NOTES
[facility]  Adult Nutrition  Progress Note    SUMMARY     Recommendations    Recommendation/Intervention:   1. Increase DAVIDE to 3x/weekly.   2. Initiate Clear Liquid diet when medically acceptable.    Goals:   Pt will tolerate TPN  Nutrition Goal Status: progressing towards goal  Communication of RD Recs:  (Cristina)    Continuum of Care Plan  Referral to Outpatient Services:  (d/c needs to be determined)    Reason for Assessment  Reason for Assessment: RD follow-up  Diagnosis:  (intestinal obstruction)  Relevent Medical History: cataract, GERD, depression, dementia, HTN      General Information Comments: Pt remains NPO. NG tube. s/p expl lap & SBR 11/27. Receivign custom TPN at 75ml/hr with IVFE every Friday.    Nutrition Prescription Ordered  Current Diet Order: NPO  Current Nutrition Support Formula Ordered:  (custom TPN with IVFE 1xweek)  Current Nutrition Support Rate Ordered: 75 (ml)  Current Nutrition Support Frequency Ordered: ml/hr      Evaluation of Received Nutrients/Fluid Intake  Parenteral Calories (kcal): 1584  Parenteral Protein (gm): 90  Parenteral Fluid (mL): 1800  Total Calories (kcal): 1655  Energy Calories Required: not meeting needs  % Kcal Needs: 89  Protein Required: meeting needs  % Protein Needs: 120  GIR (Glucose Infusion Rate) (mg/kg/min): 3.3 mg/kg/min  Lipid Calories (kcals): 71 kcals  Comments: LBM 11/26  % Intake of Estimated Energy Needs: 75 - 100 %  % Meal Intake: NPO     Nutrition Risk Screen   parenteral nutrition    Nutrition/Diet History  Food Preferences: no Yazidi or cultural food pref identified  Factors Affecting Nutritional Intake: altered gastrointestinal function, NPO     Labs/Tests/Procedures/Meds  Pertinent Labs Reviewed: reviewed  Pertinent Labs Comments: Na 133L, BUN 24H, Glu 172H, Ca 8.0L  Pertinent Medications Reviewed: reviewed  Pertinent Medications Comments: aspirin    Physical Findings  Overall Physical Appearance: overweight  Tubes:  "nasogastric tube  Oral/Mouth Cavity:  (unable to assess)  Skin:  (Alberto 16-abd incision)    Anthropometrics  Temp: 97.9 °F (36.6 °C)  Height: 5' 3" (160 cm)  Weight Method: Bed Scale  Weight: 74.6 kg (164 lb 7.4 oz)  Ideal Body Weight (IBW), Female: 115 lb  % Ideal Body Weight, Female (lb): 142.82 lb  BMI (Calculated): 29.2  BMI Grade: 25 - 29.9 - overweight    Estimated/Assessed Needs  Weight Used For Calorie Calculations: 74.5 kg (164 lb 3.9 oz)   Energy Calorie Requirements (kcal): 1863  Energy Need Method: Kcal/kg (25 kcal/kg)  RMR (Bernice-St. Jeor Equation): 1134.12  Weight Used For Protein Calculations: 74.5 kg (164 lb 3.9 oz)  Protein Requirements: 75g (1.0g/kg)  Fluid Need Method: RDA Method  RDA Method (mL): 1863     Assessment and Plan    * Intestinal obstruction    Related to (etiology):   S/p surgery    Signs and Symptoms (as evidenced by):   NPO on TPN    Interventions/Recommendations (treatment strategy):  See recs    Nutrition Diagnosis Status:   Continues            Monitor and Evaluation  Food and Nutrient Intake: parenteral nutrition intake  Food and Nutrient Adminstration: enteral and parenteral nutrition administration  Physical Activity and Function: nutrition-related ADLs and IADLs  Anthropometric Measurements: weight  Biochemical Data, Medical Tests and Procedures: electrolyte and renal panel  Nutrition-Focused Physical Findings: overall appearance    Nutrition Risk  Level of Risk:  (2xweekly)    Nutrition Follow-Up  RD Follow-up?: Yes  "

## 2017-11-30 NOTE — PLAN OF CARE
Problem: Patient Care Overview  Goal: Plan of Care Review  Pt on documented O2. No apparent distress noted. Will continue to monitor.

## 2017-11-30 NOTE — PLAN OF CARE
Problem: Physical Therapy Goal  Goal: Physical Therapy Goal  Goals to be met by: 2017  Patient will increase functional independence with mobility by performin. Supine <> sit with [place]  2. Sit <> stand transfer with Modified [place] with/without RW  3. Gait  x 150 feet with Modified [place] using Rolling Walker.       Outcome: Ongoing (interventions implemented as appropriate)  Patient with improved mobility today; still coughing up clear phlegm; low endurance; /70  O2 sats 100% with 2L; cont with POC

## 2017-11-30 NOTE — PLAN OF CARE
Problem: Patient Care Overview  Goal: Plan of Care Review  Plan of care reviewed with pt. She is alert and oriented x3 with some problems recalling date. NG tube to right nare with green/yellow fluid being collected via chamber at [facility]. TPN infusing at ordered rate to right arm PICC. Pt spitting up thick viscous mucous often. Pt provided needs and desires. VSS. Midline incision CDI with staples intact. Pt assisted to change positions often.

## 2017-11-30 NOTE — PROGRESS NOTES
Per preference skilled nursing facility (SNF) referral sent to [facility] and [facility] for possible SNF placement.

## 2017-11-30 NOTE — ASSESSMENT & PLAN NOTE
S/p ex lap, Charles with SBR POD3  Will DC NG tube, start clears  Continue TPN for now  UOP, creatinine ok. Got lasix for diuresis, good response  BP stable, mild tachy around 100.

## 2017-11-30 NOTE — PLAN OF CARE
Problem: Patient Care Overview  Goal: Plan of Care Review  Pt received on 2 lpm NC with SpO2 99 %. Treatment given with no adverse reactions. No respiratory distress noted. Will continue to monitor.

## 2017-11-30 NOTE — PT/OT/SLP PROGRESS
Physical Therapy  Treatment    Lesa Holder   MRN: [id number]   Admitting Diagnosis: Intestinal obstruction    PT Received On: 11/29/17  PT Start Time: 1643     PT Stop Time: 1704    PT Total Time (min): 21 min       Billable Minutes:  Therapeutic Activity 21 minutes    Treatment Type: Treatment  PT/PTA: PT     PTA Visit Number: 0       General Precautions: Standard, fall, NPO  Orthopedic Precautions: N/A   Braces: N/A    Do you have any cultural, spiritual, Restorationism conflicts, given your current situation?: None verbalized to PT    Subjective:  Communicated with nurse prior to session.  C/o fatigue following session    Pain/Comfort  Pain Rating 1: 0/10  Pain Rating Post-Intervention 1: 0/10    Objective:   Patient found with: NG tube, telemetry, peripheral IV, oxygen    Functional Mobility:  Bed Mobility:   Supine to Sit: Maximum Assistance  Sit to Supine: Maximum Assistance    Transfers:  Sit <> Stand Assistance: Minimum Assistance (half stand from elevated surface)  Sit <> Stand Assistive Device: No Assistive Device  Bed <> Chair Technique: Stand Pivot  Bed <> Chair Assistance: Moderate Assistance  Bed <> Chair Assistive Device: No Assistive Device    Gait:   Gait Distance: 4-6 small steps around bed<>chair  Assistance 1: Moderate assistance  Gait Assistive Device: No device  Gait Pattern: reciprocal  Gait Deviation(s): decreased yoanna, increased time in double stance, decreased velocity of limb motion, decreased step length, decreased stride length    Balance:   Static Sit: FAIR+: Able to take MINIMAL challenges from all directions  Dynamic Sit: FAIR+: Maintains balance through MINIMAL excursions of active trunk motion  Static Stand: POOR: Needs MODERATE assist to maintain  Dynamic stand: POOR: N/A     Therapeutic Activities and Exercises:  Bed mob and transfers as above; scooted toward HOB seated EOB in 3 increments; fatigue following, increased SOB and pt sounded wheezy; coughing up phlegm    AM-PAC 6 CLICK  MOBILITY  How much help from another person does this patient currently need?   1 = Unable, Total/Dependent Assistance  2 = A lot, Maximum/Moderate Assistance  3 = A little, Minimum/Contact Guard/Supervision  4 = None, Modified [place]/Independent    Turning over in bed (including adjusting bedclothes, sheets and blankets)?: 2  Sitting down on and standing up from a chair with arms (e.g., wheelchair, bedside commode, etc.): 2  Moving from lying on back to sitting on the side of the bed?: 2  Moving to and from a bed to a chair (including a wheelchair)?: 2  Need to walk in hospital room?: 1  Climbing 3-5 steps with a railing?: 1  Total Score: 10    AM-PAC Raw Score CMS G-Code Modifier Level of Impairment Assistance   6 % Total / Unable   7 - 9 CM 80 - 100% Maximal Assist   10 - 14 CL 60 - 80% Moderate Assist   15 - 19 CK 40 - 60% Moderate Assist   20 - 22 CJ 20 - 40% Minimal Assist   23 CI 1-20% SBA / CGA   24 CH 0% Independent/ Mod I     Patient left HOB elevated with all lines intact, call button in reach, bed alarm on and nurse notified.    Assessment:  Lesa Holder is a 89 y.o. female with a medical diagnosis of Intestinal obstruction   Patient with limited tolerance to activity today; required maxA sup to sit toward EOB; sit to half stand with Sweetie; modA to stand pivot transfer bed<> BSC; sit to supine with maxA; pt with fatigue and increased SOB with activity; post activity, /56  O2 sats 100% with 2L O2; informed nurse of status; cont with POC.     Rehab identified problem list/impairments: Rehab identified problem list/impairments: weakness, impaired endurance, gait instability, impaired functional mobilty, impaired self care skills, impaired balance, impaired cardiopulmonary response to activity, decreased ROM, impaired joint extensibility, impaired coordination, edema    Rehab potential is fair.    Activity tolerance: Poor+    Discharge recommendations: Discharge Facility/Level Of  Care Needs: nursing facility, skilled     Barriers to discharge: Barriers to Discharge: Decreased caregiver support    Equipment recommendations: Equipment Needed After Discharge:  (TBD)     GOALS:    Physical Therapy Goals        Problem: Physical Therapy Goal    Goal Priority Disciplines Outcome Goal Variances Interventions   Physical Therapy Goal     PT/OT, PT Ongoing (interventions implemented as appropriate)     Description:  Goals to be met by: 2017  Patient will increase functional independence with mobility by performin. Supine <> sit with [place]  2. Sit <> stand transfer with Modified [place] with/without RW  3. Gait  x 150 feet with Modified [place] using Rolling Walker.                        PLAN:    Patient to be seen 6 x/week  to address the above listed problems via gait training, therapeutic activities, therapeutic exercises  Plan of Care expires: 17  Plan of Care reviewed with: patient         Lupe Sebastian, PT  2017

## 2017-11-30 NOTE — PLAN OF CARE
Problem: Nutrition, Parenteral (Adult)  Goal: Signs and Symptoms of Listed Potential Problems Will be Absent, Minimized or Managed (Nutrition, Parenteral)  Signs and symptoms of listed potential problems will be absent, minimized or managed by discharge/transition of care (reference Nutrition, Parenteral (Adult) CPG).  Outcome: Ongoing (interventions implemented as appropriate)  Recommendation/Intervention:   1. Increase DAVIDE to 3x/weekly.   2. Initiate Clear Liquid diet when medically acceptable.    Goals:   Pt will tolerate TPN  Nutrition Goal Status: progressing towards goal  Communication of RD Recs:  Von)

## 2017-11-30 NOTE — PLAN OF CARE
Problem: Occupational Therapy Goal  Goal: Occupational Therapy Goal  Goals to be met by: 12/10/2017    Patient will increase functional independence with ADLs by performing:    UE Dressing with Set-up Assistance.  LE Dressing with Minimal Assistance with adaptive devices  Grooming while seated at sink with Supervision.  Toileting from bedside commode with Supervision for hygiene and clothing management.   Supine to sit with Modified [place].  Stand pivot transfers with Supervision.  Toilet transfer to bedside commode with Supervision.  Increased functional strength to 4/5  for BUE  Upper extremity exercise program 10 reps per handout, with supervision.    Outcome: Ongoing (interventions implemented as appropriate)  Pt. Would benefit from OT to address deficits. Recommend SNF at discharge. Continue with OT 5 x week.

## 2017-11-30 NOTE — PT/OT/SLP PROGRESS
Physical Therapy  Treatment    Lesa Holder   MRN: [id number]   Admitting Diagnosis: Intestinal obstruction    PT Received On: 11/30/17  PT Start Time: 1002     PT Stop Time: 1040    PT Total Time (min): 38 min       Billable Minutes:  Therapeutic Activity 25 minutes and Therapeutic Exercise 13 minutes    Treatment Type: Treatment  PT/PTA: PT     PTA Visit Number: 0       General Precautions: Standard, fall, NPO  Orthopedic Precautions: N/A   Braces: N/A    Do you have any cultural, spiritual, Orthodox conflicts, given your current situation?: None verbalized to PT    Subjective:  Communicated with nurse prior to session.  Feeling better    Pain/Comfort  Pain Rating 1: 0/10  Pain Rating Post-Intervention 1: 0/10    Objective:   Patient found with: NG tube, telemetry, peripheral IV, oxygen    Functional Mobility:  Bed Mobility:   Rolling/Turning Right: Stand by assistance  Scooting/Bridging: Contact Guard Assistance (toward EOB in sitting)  Supine to Sit: Contact Guard Assistance    Transfers:  Sit <> Stand Assistance: Contact Guard Assistance, Minimum Assistance (from EOB x 2 trials, from BSC x 3 trials for cleansing)  Sit <> Stand Assistive Device: Rolling Walker  Bed <> Chair Assistance: Minimum Assistance  Bed <> Chair Assistive Device: No Assistive Device    Gait:   Gait Distance: 5ft from bed to bedside chair with very slow yoanna  Assistance 1: Contact Guard Assistance, Minimum assistance  Gait Assistive Device: Rolling walker (occasional Sweetie with RW management)  Gait Pattern: reciprocal  Gait Deviation(s): decreased yoanna, increased time in double stance, decreased velocity of limb motion, decreased step length, decreased stride length, forward lean    Balance:   Static Sit: GOOD: Takes MODERATE challenges from all directions  Dynamic Sit: GOOD-: Maintains balance through MODERATE excursions of active trunk movement,     Static Stand: FAIR: Maintains without assist but unable to take challenges  Dynamic  stand: FAIR: Needs CONTACT GUARD during gait     Therapeutic Activities and Exercises:  Bed mob and transfers as described with increased time to complete; pt urinated and cleansed self while standing with CGA; pt amb to bedside chair as described; BLE ex seated in chair-APs and circles 20 reps each; 15 reps FAQ/knee fl; 15 reps hip abd/add, marches x 15 reps    AM-PAC 6 CLICK MOBILITY  How much help from another person does this patient currently need?   1 = Unable, Total/Dependent Assistance  2 = A lot, Maximum/Moderate Assistance  3 = A little, Minimum/Contact Guard/Supervision  4 = None, Modified [place]/Independent    Turning over in bed (including adjusting bedclothes, sheets and blankets)?: 3  Sitting down on and standing up from a chair with arms (e.g., wheelchair, bedside commode, etc.): 3  Moving from lying on back to sitting on the side of the bed?: 3  Moving to and from a bed to a chair (including a wheelchair)?: 3  Need to walk in hospital room?: 3  Climbing 3-5 steps with a railing?: 1  Total Score: 16    AM-PAC Raw Score CMS G-Code Modifier Level of Impairment Assistance   6 % Total / Unable   7 - 9 CM 80 - 100% Maximal Assist   10 - 14 CL 60 - 80% Moderate Assist   15 - 19 CK 40 - 60% Moderate Assist   20 - 22 CJ 20 - 40% Minimal Assist   23 CI 1-20% SBA / CGA   24 CH 0% Independent/ Mod I     Patient left up in chair with all lines intact, call button in reach and nurse present.    Assessment:  Lesa Holder is a 89 y.o. female with a medical diagnosis of Intestinal obstruction   Patient with improved mobility today; still coughing up clear phlegm; low endurance; /70  O2 sats 100% with 2L; cont with POC     Rehab identified problem list/impairments: Rehab identified problem list/impairments: weakness, impaired endurance, impaired self care skills, impaired balance, gait instability, impaired functional mobilty, impaired cardiopulmonary response to activity, decreased ROM,  impaired joint extensibility, edema, impaired coordination    Rehab potential is good.    Activity tolerance: Fair+    Discharge recommendations: Discharge Facility/Level Of Care Needs: nursing facility, skilled     Barriers to discharge: Barriers to Discharge: Decreased caregiver support    Equipment recommendations: Equipment Needed After Discharge:  (TBD)     GOALS:    Physical Therapy Goals        Problem: Physical Therapy Goal    Goal Priority Disciplines Outcome Goal Variances Interventions   Physical Therapy Goal     PT/OT, PT Ongoing (interventions implemented as appropriate)     Description:  Goals to be met by: 2017  Patient will increase functional independence with mobility by performin. Supine <> sit with [place]  2. Sit <> stand transfer with Modified [place] with/without RW  3. Gait  x 150 feet with Modified [place] using Rolling Walker.                        PLAN:    Patient to be seen 6 x/week  to address the above listed problems via gait training, therapeutic activities, therapeutic exercises  Plan of Care expires: 17  Plan of Care reviewed with: patient, spouse         Lupe L Romulo, PT  2017

## 2017-11-30 NOTE — PROGRESS NOTES
received returned phone call from long-term care services and completed a level of care eligibility tool (LOCET) for nursing facility screening admission.

## 2017-12-01 LAB
ALBUMIN SERPL BCP-MCNC: 1.8 G/DL
ALP SERPL-CCNC: 131 U/L
ALT SERPL W/O P-5'-P-CCNC: 98 U/L
ANION GAP SERPL CALC-SCNC: 7 MMOL/L
AST SERPL-CCNC: 125 U/L
BASOPHILS # BLD AUTO: ABNORMAL K/UL
BASOPHILS NFR BLD: 0 %
BILIRUB DIRECT SERPL-MCNC: 0.3 MG/DL
BILIRUB SERPL-MCNC: 0.7 MG/DL
BUN SERPL-MCNC: 21 MG/DL
CALCIUM SERPL-MCNC: 8 MG/DL
CHLORIDE SERPL-SCNC: 98 MMOL/L
CO2 SERPL-SCNC: 29 MMOL/L
CREAT SERPL-MCNC: 0.7 MG/DL
DIFFERENTIAL METHOD: ABNORMAL
EOSINOPHIL # BLD AUTO: ABNORMAL K/UL
EOSINOPHIL NFR BLD: 2 %
ERYTHROCYTE [DISTWIDTH] IN BLOOD BY AUTOMATED COUNT: 14 %
EST. GFR  (AFRICAN AMERICAN): >60 ML/MIN/1.73 M^2
EST. GFR  (NON AFRICAN AMERICAN): >60 ML/MIN/1.73 M^2
GLUCOSE SERPL-MCNC: 108 MG/DL
HCT VFR BLD AUTO: 28.4 %
HGB BLD-MCNC: 9.1 G/DL
HYPOCHROMIA BLD QL SMEAR: ABNORMAL
LYMPHOCYTES # BLD AUTO: ABNORMAL K/UL
LYMPHOCYTES NFR BLD: 7 %
MAGNESIUM SERPL-MCNC: 2 MG/DL
MCH RBC QN AUTO: 28.9 PG
MCHC RBC AUTO-ENTMCNC: 32 G/DL
MCV RBC AUTO: 90 FL
MONOCYTES # BLD AUTO: ABNORMAL K/UL
MONOCYTES NFR BLD: 10 %
NEUTROPHILS NFR BLD: 81 %
OVALOCYTES BLD QL SMEAR: ABNORMAL
PHOSPHATE SERPL-MCNC: 3.3 MG/DL
PLATELET # BLD AUTO: 322 K/UL
PLATELET BLD QL SMEAR: ABNORMAL
PMV BLD AUTO: 8.7 FL
POIKILOCYTOSIS BLD QL SMEAR: SLIGHT
POTASSIUM SERPL-SCNC: 3.9 MMOL/L
PROT SERPL-MCNC: 5.6 G/DL
RBC # BLD AUTO: 3.15 M/UL
SODIUM SERPL-SCNC: 134 MMOL/L
WBC # BLD AUTO: 12.23 K/UL

## 2017-12-01 PROCEDURE — 25000003 PHARM REV CODE 250: Performed by: HOSPITALIST

## 2017-12-01 PROCEDURE — 83735 ASSAY OF MAGNESIUM: CPT

## 2017-12-01 PROCEDURE — 80048 BASIC METABOLIC PNL TOTAL CA: CPT

## 2017-12-01 PROCEDURE — 85027 COMPLETE CBC AUTOMATED: CPT

## 2017-12-01 PROCEDURE — 25000242 PHARM REV CODE 250 ALT 637 W/ HCPCS: Performed by: HOSPITALIST

## 2017-12-01 PROCEDURE — 94640 AIRWAY INHALATION TREATMENT: CPT

## 2017-12-01 PROCEDURE — 25000003 PHARM REV CODE 250: Performed by: SURGERY

## 2017-12-01 PROCEDURE — B4185 PARENTERAL SOL 10 GM LIPIDS: HCPCS | Performed by: STUDENT IN AN ORGANIZED HEALTH CARE EDUCATION/TRAINING PROGRAM

## 2017-12-01 PROCEDURE — 97535 SELF CARE MNGMENT TRAINING: CPT

## 2017-12-01 PROCEDURE — 11000001 HC ACUTE MED/SURG PRIVATE ROOM

## 2017-12-01 PROCEDURE — 85007 BL SMEAR W/DIFF WBC COUNT: CPT

## 2017-12-01 PROCEDURE — 94799 UNLISTED PULMONARY SVC/PX: CPT

## 2017-12-01 PROCEDURE — 84100 ASSAY OF PHOSPHORUS: CPT

## 2017-12-01 PROCEDURE — 94761 N-INVAS EAR/PLS OXIMETRY MLT: CPT

## 2017-12-01 PROCEDURE — 80076 HEPATIC FUNCTION PANEL: CPT

## 2017-12-01 PROCEDURE — 86580 TB INTRADERMAL TEST: CPT | Performed by: STUDENT IN AN ORGANIZED HEALTH CARE EDUCATION/TRAINING PROGRAM

## 2017-12-01 PROCEDURE — 25000003 PHARM REV CODE 250: Performed by: PHYSICIAN ASSISTANT

## 2017-12-01 PROCEDURE — 97110 THERAPEUTIC EXERCISES: CPT

## 2017-12-01 PROCEDURE — 63600175 PHARM REV CODE 636 W HCPCS: Performed by: STUDENT IN AN ORGANIZED HEALTH CARE EDUCATION/TRAINING PROGRAM

## 2017-12-01 PROCEDURE — 25000003 PHARM REV CODE 250: Performed by: STUDENT IN AN ORGANIZED HEALTH CARE EDUCATION/TRAINING PROGRAM

## 2017-12-01 PROCEDURE — 36415 COLL VENOUS BLD VENIPUNCTURE: CPT

## 2017-12-01 RX ADMIN — ENOXAPARIN SODIUM 40 MG: 100 INJECTION SUBCUTANEOUS at 09:12

## 2017-12-01 RX ADMIN — IPRATROPIUM BROMIDE AND ALBUTEROL SULFATE 3 ML: .5; 3 SOLUTION RESPIRATORY (INHALATION) at 01:12

## 2017-12-01 RX ADMIN — METHOCARBAMOL 500 MG: 500 TABLET ORAL at 11:12

## 2017-12-01 RX ADMIN — ESCITALOPRAM OXALATE 20 MG: 20 TABLET, FILM COATED ORAL at 08:12

## 2017-12-01 RX ADMIN — METHOCARBAMOL 500 MG: 500 TABLET ORAL at 05:12

## 2017-12-01 RX ADMIN — RAMELTEON 8 MG: 8 TABLET, FILM COATED ORAL at 11:12

## 2017-12-01 RX ADMIN — IPRATROPIUM BROMIDE AND ALBUTEROL SULFATE 3 ML: .5; 3 SOLUTION RESPIRATORY (INHALATION) at 07:12

## 2017-12-01 RX ADMIN — METHOCARBAMOL 500 MG: 500 TABLET ORAL at 12:12

## 2017-12-01 RX ADMIN — IPRATROPIUM BROMIDE AND ALBUTEROL SULFATE 3 ML: .5; 3 SOLUTION RESPIRATORY (INHALATION) at 12:12

## 2017-12-01 RX ADMIN — CHLORHEXIDINE GLUCONATE 15 ML: 1.2 RINSE ORAL at 08:12

## 2017-12-01 RX ADMIN — MEMANTINE HYDROCHLORIDE 10 MG: 5 TABLET ORAL at 09:12

## 2017-12-01 RX ADMIN — ENALAPRILAT 1.25 MG: 1.25 INJECTION, SOLUTION INTRAVENOUS at 05:12

## 2017-12-01 RX ADMIN — SOYBEAN OIL 250 ML: 20 INJECTION, SOLUTION INTRAVENOUS at 11:12

## 2017-12-01 RX ADMIN — CHLORHEXIDINE GLUCONATE 15 ML: 1.2 RINSE ORAL at 09:12

## 2017-12-01 RX ADMIN — ASCORBIC ACID, VITAMIN A PALMITATE, CHOLECALCIFEROL, THIAMINE HYDROCHLORIDE, RIBOFLAVIN-5 PHOSPHATE SODIUM, PYRIDOXINE HYDROCHLORIDE, NIACINAMIDE, DEXPANTHENOL, ALPHA-TOCOPHEROL ACETATE, VITAMIN K1, FOLIC ACID, BIOTIN, CYANOCOBALAMIN: 200; 3300; 200; 6; 3.6; 6; 40; 15; 10; 150; 600; 60; 5 INJECTION, SOLUTION INTRAVENOUS at 11:12

## 2017-12-01 RX ADMIN — TUBERCULIN PURIFIED PROTEIN DERIVATIVE 5 UNITS: 5 INJECTION INTRADERMAL at 05:12

## 2017-12-01 RX ADMIN — ACETAMINOPHEN 650 MG: 325 TABLET ORAL at 11:12

## 2017-12-01 RX ADMIN — DONEPEZIL HYDROCHLORIDE 10 MG: 5 TABLET, FILM COATED ORAL at 09:12

## 2017-12-01 RX ADMIN — ASPIRIN 81 MG 81 MG: 81 TABLET ORAL at 08:12

## 2017-12-01 RX ADMIN — MEMANTINE HYDROCHLORIDE 10 MG: 5 TABLET ORAL at 08:12

## 2017-12-01 RX ADMIN — DONEPEZIL HYDROCHLORIDE 10 MG: 5 TABLET, FILM COATED ORAL at 08:12

## 2017-12-01 NOTE — PLAN OF CARE
Problem: Patient Care Overview  Goal: Plan of Care Review  Outcome: Ongoing (interventions implemented as appropriate)  Pt is alert and oriented with waking periods of confusion. At approximately 23:00 the patient got out of bed unassisted and inadvertently removed her PICC. New orders for PICC placement obtained. Pt missed her IV medication and TPN was stopped. She states that she has some minor pain to her right lower abdomen. Pt had first BM since surgery. Small loose stool. Telemetry monitored. Bed alarm active. Bed rails up x3.

## 2017-12-01 NOTE — PLAN OF CARE
Problem: Patient Care Overview  Goal: Plan of Care Review  Outcome: Ongoing (interventions implemented as appropriate)   11/30/17 0596   Coping/Psychosocial   Plan Of Care Reviewed With patient;spouse   Pt awake , alert and sometimes confused.Pt NG tube removed today, per MD orders. Pt tolerated liquids, still complains on fullness.Pt has TPN running at 75ml/hr to right PICC line. No complaints of pain.Pt on 2 liters NC. Pt on tele, [id number].

## 2017-12-01 NOTE — PROGRESS NOTES
[facility]  General Surgery  Progress Note    Subjective:     History of Present Illness:  No notes on file    Post-Op Info:  Procedure(s) (LRB):  LAPAROTOMY-EXPLORATORY (N/A)  RESECTION-SMALL BOWEL   4 Days Post-Op     Interval History:   NG out yesterday, feeling ok this morning  Still passing flatus  No NV. States she gagged a little bit yesterday, otherwise tolerated clears  Small BM overnight  Pain controlled  Accidentally pulled PICC overnight    Medications:  Continuous Infusions:   Amino acid 5% - dextrose 20% (CLINIMIX-E) solution with additives (1L provides 50 gm AA, 200 gm CHO (680 kcal/L dextrose), Na 35, K 30, Mg 5, Ca 4.5, Acetate 80, Cl 39, Phos 15) 75 mL/hr at 11/30/17 2101     Scheduled Meds:   albuterol-ipratropium 2.5mg-0.5mg/3mL  3 mL Nebulization Q6H    aspirin  81 mg Oral Daily    chlorhexidine  15 mL Mouth/Throat BID    donepezil  10 mg Oral BID    enalaprilat  1.25 mg Intravenous Q6H    enoxaparin  40 mg Subcutaneous Q24H    escitalopram oxalate  20 mg Oral Daily    fat emulsion 20%  250 mL Intravenous Every Fri    memantine  10 mg Oral BID    methocarbamol  500 mg Oral QID     PRN Meds:acetaminophen, diphenhydrAMINE, hydrALAZINE, influenza, ondansetron, ramelteon, sodium chloride 0.9%     Review of patient's allergies indicates:  No Known Allergies  Objective:     Vital Signs (Most Recent):  Temp: 96.6 °F (35.9 °C) (12/01/17 0724)  Pulse: 83 (12/01/17 0746)  Resp: 18 (12/01/17 0746)  BP: (!) 154/71 (12/01/17 0724)  SpO2: (!) 94 % (12/01/17 0746) Vital Signs (24h Range):  Temp:  [96.6 °F (35.9 °C)-98.7 °F (37.1 °C)] 96.6 °F (35.9 °C)  Pulse:  [] 83  Resp:  [16-20] 18  SpO2:  [93 %-99 %] 94 %  BP: (117-154)/(58-72) 154/71     Weight: 74.6 kg (164 lb 7.4 oz)  Body mass index is 29.13 kg/m².    Intake/Output - Last 3 Shifts       11/29 0700 - 11/30 0659 11/30 0700 - 12/01 0659 12/01 0700 - 12/02 0659    P.O.  425     I.V. (mL/kg)       NG/GT 40      IV  Piggyback             Total Intake(mL/kg) 925 (12.4) 425 (5.7)     Urine (mL/kg/hr) 685 (0.4) 300 (0.2)     Emesis/NG output  1 (0)     Drains 250 (0.1) 200 (0.1)     Stool       Total Output 935 501      Net -10 -76                   Physical Exam  Awake, oriented, solmnolent  Comfortable in bed, NAD  Ab soft, nondistended, Incision without erythema or drainage  Appropriately TTP      Significant Labs:  CBC:   Recent Labs  Lab 11/30/17  0625   WBC 15.57*   RBC 3.46*   HGB 10.1*   HCT 30.7*      MCV 89   MCH 29.2   MCHC 32.9     BMP:   Recent Labs  Lab 11/30/17  0625   *   *   K 3.9   CL 97   CO2 28   BUN 24*   CREATININE 0.7   CALCIUM 8.0*   MG 2.0       Significant Diagnostics:  I have reviewed all pertinent imaging results/findings within the past 24 hours.    Assessment/Plan:     * Intestinal obstruction    S/p ex lap, [place] with SBR POD4  Clears, will go ahead and start soft diet.  Stop TPN if tolerates diet  BP stable, tachycardia improving  PT/OT  Will need SNF placement, case management working on it              Barney Gardner MD  General Surgery  [facility]

## 2017-12-01 NOTE — PLAN OF CARE
Problem: Physical Therapy Goal  Goal: Physical Therapy Goal  Goals to be met by: 2017  Patient will increase functional independence with mobility by performin. Supine <> sit with [place]  2. Sit <> stand transfer with Modified [place] with/without RW  3. Gait  x 150 feet with Modified [place] using Rolling Walker.       Outcome: Ongoing (interventions implemented as appropriate)  Continue working toward goals.

## 2017-12-01 NOTE — PT/OT/SLP PROGRESS
Occupational Therapy  Treatment    Lesa Holder   MRN: [id number]   Admitting Diagnosis: Intestinal obstruction    OT Date of Treatment: 12/01/17   OT Start Time: 1330  OT Stop Time: 1354  OT Total Time (min): 24 min    Billable Minutes:  Self Care/Home Management 24    General Precautions: Standard, fall  Orthopedic Precautions: N/A  Braces: N/A    Do you have any cultural, spiritual, Oriental orthodox conflicts, given your current situation?: na    Subjective:  Communicated with nurseOmaira prior to session.    Pain/Comfort  Pain Rating 1: 10/10 (under R breast)  Pain Rating Post-Intervention 1: 4/10    Objective:  Patient found with: bed alarm, PICC line, telemetry     Functional Mobility:  Bed Mobility:  Rolling/Turning to Left: Minimum assistance  Rolling/Turning Right: Minimum assistance  Scooting/Bridging: Contact Guard Assistance, Total Assistance, With assist of 2 (CGA to EOB; Total (A) of 2 to HOB via drawsheet)  Supine to Sit: Minimum Assistance, WIth side rail  Sit to Supine: Minimum Assistance, With leg lift    Transfers:   Sit <> Stand Assistance: Minimum Assistance  Sit <> Stand Assistive Device: Rolling Walker  Toilet Transfer Assistance: Contact Guard Assistance  Toilet Transfer Assistive Device: Rolling Walker, bedside commode    Functional Ambulation: N/A    Activities of Daily Living:  UE Dressing Level of Assistance: Minimum assistance (to re-snap gown sleeves)  Grooming Position: Seated, EOB (hand hygiene)  Toileting Where Assessed: Bedside commode  Toileting Level of Assistance: Total assistance    Balance:   Static Sit: GOOD-: Takes MODERATE challenges from all directions but inconsistently  Dynamic Sit: FAIR+: Maintains balance through MINIMAL excursions of active trunk motion  Static Stand: FAIR: Maintains without assist but unable to take challenges  Dynamic stand: FAIR: Needs CONTACT GUARD during gait    Therapeutic Activities and Exercises:  On arrival, patient finishing breathing treatment and  "actively moaning. Reporting 10/10 pain under R breast - initially unable to locate source of pain, but tele electrode was removed and patient confirmed relief. Patient requesting to toilet. Bed mob as above, with increased time and VCs. Patient stood with CGA-Min (A) and t/f to BSC. Toileting as above. Patient returned to EOB and then supine.     AM-PAC 6 CLICK ADL   How much help from another person does this patient currently need?   1 = Unable, Total/Dependent Assistance  2 = A lot, Maximum/Moderate Assistance  3 = A little, Minimum/Contact Guard/Supervision  4 = None, Modified [place]/Independent    Putting on and taking off regular lower body clothing? : 2  Bathing (including washing, rinsing, drying)?: 2  Toileting, which includes using toilet, bedpan, or urinal? : 2  Putting on and taking off regular upper body clothing?: 3  Taking care of personal grooming such as brushing teeth?: 3  Eating meals?: 4  Total Score: 16     AM-PAC Raw Score CMS "G-Code Modifier Level of Impairment Assistance   6 % Total / Unable   7 - 8 CM 80 - 100% Maximal Assist   9-13 CL 60 - 80% Moderate Assist   14 - 19 CK 40 - 60% Moderate Assist   20 - 22 CJ 20 - 40% Minimal Assist   23 CI 1-20% SBA / CGA   24 CH 0% Independent/ Mod I       Patient left HOB elevated with all lines intact, call button in reach, bed alarm on, RN notified and spouse present    ASSESSMENT: Patient with increased pain this date. Will benefit from continued skilled OT to address functional deficits.   Lesa Holder is a 89 y.o. female with a medical diagnosis of Intestinal obstruction     Rehab identified problem list/impairments: Rehab identified problem list/impairments: weakness, impaired endurance, impaired self care skills, impaired functional mobilty, gait instability, impaired balance, pain, decreased safety awareness    Rehab potential is good.    Activity tolerance: Fair    Discharge recommendations: Discharge Facility/Level Of Care " Needs: nursing facility, skilled     Barriers to discharge: Barriers to Discharge: Decreased caregiver support    Equipment recommendations:  (has DME)     GOALS:    Occupational Therapy Goals        Problem: Occupational Therapy Goal    Goal Priority Disciplines Outcome Interventions   Occupational Therapy Goal     OT, PT/OT Ongoing (interventions implemented as appropriate)    Description:  Goals to be met by: 12/10/2017    Patient will increase functional independence with ADLs by performing:    UE Dressing with Set-up Assistance.  LE Dressing with Minimal Assistance with adaptive devices  Grooming while seated at sink with Supervision.  Toileting from bedside commode with Supervision for hygiene and clothing management.   Supine to sit with Modified [place].  Stand pivot transfers with Supervision.  Toilet transfer to bedside commode with Supervision.  Increased functional strength to 4/5  for BUE  Upper extremity exercise program 10 reps per handout, with supervision.                      Plan:  Patient to be seen 5 x/week to address the above listed problems via self-care/home management, therapeutic activities, therapeutic exercises  Plan of Care expires: 12/30/17  Plan of Care reviewed with: patient, spouse         BRANDON Velasquez  12/01/2017

## 2017-12-01 NOTE — PROGRESS NOTES
received phone call from [facility] and [facility] admit coordinators stating they have received referral and it is being reviewed by nurse.

## 2017-12-01 NOTE — SUBJECTIVE & OBJECTIVE
Interval History:   NG out yesterday, feeling ok this morning  Still passing flatus  No NV. States she gagged a little bit yesterday, otherwise tolerated clears  Small BM overnight  Pain controlled  Accidentally pulled PICC overnight    Medications:  Continuous Infusions:   Amino acid 5% - dextrose 20% (CLINIMIX-E) solution with additives (1L provides 50 gm AA, 200 gm CHO (680 kcal/L dextrose), Na 35, K 30, Mg 5, Ca 4.5, Acetate 80, Cl 39, Phos 15) 75 mL/hr at 11/30/17 2101     Scheduled Meds:   albuterol-ipratropium 2.5mg-0.5mg/3mL  3 mL Nebulization Q6H    aspirin  81 mg Oral Daily    chlorhexidine  15 mL Mouth/Throat BID    donepezil  10 mg Oral BID    enalaprilat  1.25 mg Intravenous Q6H    enoxaparin  40 mg Subcutaneous Q24H    escitalopram oxalate  20 mg Oral Daily    fat emulsion 20%  250 mL Intravenous Every Fri    memantine  10 mg Oral BID    methocarbamol  500 mg Oral QID     PRN Meds:acetaminophen, diphenhydrAMINE, hydrALAZINE, influenza, ondansetron, ramelteon, sodium chloride 0.9%     Review of patient's allergies indicates:  No Known Allergies  Objective:     Vital Signs (Most Recent):  Temp: 96.6 °F (35.9 °C) (12/01/17 0724)  Pulse: 83 (12/01/17 0746)  Resp: 18 (12/01/17 0746)  BP: (!) 154/71 (12/01/17 0724)  SpO2: (!) 94 % (12/01/17 0746) Vital Signs (24h Range):  Temp:  [96.6 °F (35.9 °C)-98.7 °F (37.1 °C)] 96.6 °F (35.9 °C)  Pulse:  [] 83  Resp:  [16-20] 18  SpO2:  [93 %-99 %] 94 %  BP: (117-154)/(58-72) 154/71     Weight: 74.6 kg (164 lb 7.4 oz)  Body mass index is 29.13 kg/m².    Intake/Output - Last 3 Shifts       11/29 0700 - 11/30 0659 11/30 0700 - 12/01 0659 12/01 0700 - 12/02 0659    P.O.  425     I.V. (mL/kg)       NG/GT 40      IV Piggyback             Total Intake(mL/kg) 925 (12.4) 425 (5.7)     Urine (mL/kg/hr) 685 (0.4) 300 (0.2)     Emesis/NG output  1 (0)     Drains 250 (0.1) 200 (0.1)     Stool       Total Output 935 501      Net -10 -76                    Physical Exam  Awake, oriented, solmnolent  Comfortable in bed, NAD  Ab soft, nondistended, Incision without erythema or drainage  Appropriately TTP      Significant Labs:  CBC:   Recent Labs  Lab 11/30/17  0625   WBC 15.57*   RBC 3.46*   HGB 10.1*   HCT 30.7*      MCV 89   MCH 29.2   MCHC 32.9     BMP:   Recent Labs  Lab 11/30/17  0625   *   *   K 3.9   CL 97   CO2 28   BUN 24*   CREATININE 0.7   CALCIUM 8.0*   MG 2.0       Significant Diagnostics:  I have reviewed all pertinent imaging results/findings within the past 24 hours.

## 2017-12-01 NOTE — NURSING
Pt left bed unattended and inadvertently removed her PICC line. Dr. Ziegler's team contacted about need for possibly a replacement. A consult order was placed. Dr. Shen was contacted to ensure that another PICC placement would be indicated. She stated that it would be ok.

## 2017-12-01 NOTE — PT/OT/SLP PROGRESS
Occupational Therapy  Visit Attempt    Lesa Holder  MRN: [id number]    Patient not seen today secondary to RN present to place PICC line. Will follow-up later, as time permits.    BRANDON Velasquez  12/1/2017

## 2017-12-01 NOTE — CONSULTS
Patient not ready for d/c today. Plan for d/c next week. Dr Gardner to sign PASSR and  Orders PPD.       TN to follow up on Monday.      Future Appointments  Date Time Provider Department Center   12/6/2017 4:00 PM Pedro Pablo Drake MD [facility]   12/11/2017 9:00 AM Barney Gardner MD [facility]   2/7/2018 2:00 PM Miriam Saunders MD [facility]

## 2017-12-01 NOTE — PT/OT/SLP PROGRESS
"Physical Therapy  Treatment    Lesa Holder   MRN: [id number]   Admitting Diagnosis: Intestinal obstruction    PT Received On: 12/01/17  PT Start Time: 1643     PT Stop Time: 1654    PT Total Time (min): 11 min       Billable Minutes:  Therapeutic Exercise 11    Treatment Type: Treatment  PT/PTA: PTA     PTA Visit Number: 1       General Precautions: Standard, fall, NPO  Orthopedic Precautions: N/A   Braces:      Do you have any cultural, spiritual, Protestant conflicts, given your current situation?: None verbalized to PT    Subjective:  Communicated with RN (Omaira) prior to session.  Pt sleeping upon entering the room.  When asked if she was still nauseous pt responded, "Yes."  Pt agreed to BLE therex in bed.    Pain/Comfort  Pain Rating 1:  (no c/o pain)  Pain Rating Post-Intervention 1:  (as above)    Objective:   Patient found with: bed alarm, SCD, telemetry, PICC line    Functional Mobility:  Bed Mobility:        Transfers:       Gait:        Stairs:  Therapeutic Activities and Exercises:  BLE therex/PROM: AP, heelslides with [place] 10 x 2 reps  PROM all remaining available planes x 15 reps.  Pt had difficulty staying awake toward end of tx.     AM-PAC 6 CLICK MOBILITY  How much help from another person does this patient currently need?   1 = Unable, Total/Dependent Assistance  2 = A lot, Maximum/Moderate Assistance  3 = A little, Minimum/Contact Guard/Supervision  4 = None, Modified [place]/Independent    Turning over in bed (including adjusting bedclothes, sheets and blankets)?: 3  Sitting down on and standing up from a chair with arms (e.g., wheelchair, bedside commode, etc.): 3  Moving from lying on back to sitting on the side of the bed?: 3  Moving to and from a bed to a chair (including a wheelchair)?: 3  Need to walk in hospital room?: 3  Climbing 3-5 steps with a railing?: 1  Total Score: 16    AM-PAC Raw Score CMS G-Code Modifier Level of Impairment Assistance   6 % Total / Unable   7 - 9 CM " 80 - 100% Maximal Assist   10 - 14 CL 60 - 80% Moderate Assist   15 - 19 CK 40 - 60% Moderate Assist   20 - 22 CJ 20 - 40% Minimal Assist   23 CI 1-20% SBA / CGA   24 CH 0% Independent/ Mod I     Patient left supine with all lines intact, call button in reach, bed alarm on and Rn notified.    Assessment:  Lesa Holder is a 89 y.o. female with a medical diagnosis of Intestinal obstruction and presents with decreased strength, endurance, and mobility.  Pt would continue to benefit from P.T. To address impairments listed below.    Rehab identified problem list/impairments: Rehab identified problem list/impairments: weakness, impaired endurance, impaired functional mobilty    Rehab potential is fair.    Activity tolerance: Poor    Discharge recommendations: Discharge Facility/Level Of Care Needs: nursing facility, skilled     Barriers to discharge: Barriers to Discharge: Decreased caregiver support    Equipment recommendations: Equipment Needed After Discharge:  (TBD)     GOALS:    Physical Therapy Goals        Problem: Physical Therapy Goal    Goal Priority Disciplines Outcome Goal Variances Interventions   Physical Therapy Goal     PT/OT, PT Ongoing (interventions implemented as appropriate)     Description:  Goals to be met by: 2017  Patient will increase functional independence with mobility by performin. Supine <> sit with [place]  2. Sit <> stand transfer with Modified [place] with/without RW  3. Gait  x 150 feet with Modified [place] using Rolling Walker.                        PLAN:    Patient to be seen 6 x/week  to address the above listed problems via gait training, therapeutic activities, therapeutic exercises  Plan of Care expires: 17  Plan of Care reviewed with: patient         Edyta Jose, PTA  2017

## 2017-12-01 NOTE — PLAN OF CARE
Problem: Patient Care Overview  Goal: Plan of Care Review  Outcome: Ongoing (interventions implemented as appropriate)  The proper method of use, as well as anticipated side effects, of this aerosol treatment are discussed and demonstrated to the patient.   Pt on RA with sats of 94%.  Will continue to monitor.

## 2017-12-01 NOTE — PROCEDURES
"Lesa Holder is a 89 y.o. female patient.    Temp: 96.6 °F (35.9 °C) (12/01/17 0724)  Pulse: 83 (12/01/17 0746)  Resp: 18 (12/01/17 0746)  BP: (!) 154/71 (12/01/17 0724)  SpO2: (!) 94 % (12/01/17 0746)  Weight: 74.6 kg (164 lb 7.4 oz) (11/29/17 0600)  Height: 5' 3" (160 cm) (11/27/17 2300)    PICC  Date/Time: 12/1/2017 12:14 PM  Performed by: SYLVIA LAZO  Consent Done: Yes  Time out: Immediately prior to procedure a time out was called to verify the correct patient, procedure, equipment, support staff and site/side marked as required  Indications: med administration and vascular access  Anesthesia: local infiltration  Local anesthetic: lidocaine 1% without epinephrine  Anesthetic Total (mL): 10  Preparation: skin prepped with ChloraPrep  Skin prep agent dried: skin prep agent completely dried prior to procedure  Sterile barriers: all five maximum sterile barriers used - cap, mask, sterile gown, sterile gloves, and large sterile sheet  Hand hygiene: hand hygiene performed prior to central venous catheter insertion  Location details: left brachial  Catheter type: double lumen  Catheter size: 5 Fr  Catheter Length: 38cm    Ultrasound guidance: yes  Vessel Caliber: small, compressibility poor  Needle advanced into vessel with real time Ultrasound guidance.  Guidewire confirmed in vessel.  Sterile sheath used.  Number of attempts: 2  Post-procedure: blood return through all ports and sterile dressing applied  Estimated blood loss (mL): 3    Comments: ANTONINA ATTEMPTED BUT LAST 5CM WOULD NOT THREAD. JACOBO LINE INSERTED WITH MINIMAL DIFFICULTY. PT TOLERATED WELL. WAIT FOR RAD REPORT BEFORE USING        Sylvia Lazo  12/1/2017  "

## 2017-12-01 NOTE — PLAN OF CARE
Problem: Occupational Therapy Goal  Goal: Occupational Therapy Goal  Goals to be met by: 12/10/2017    Patient will increase functional independence with ADLs by performing:    UE Dressing with Set-up Assistance.  LE Dressing with Minimal Assistance with adaptive devices  Grooming while seated at sink with Supervision.  Toileting from bedside commode with Supervision for hygiene and clothing management.   Supine to sit with Modified [place].  Stand pivot transfers with Supervision.  Toilet transfer to bedside commode with Supervision.  Increased functional strength to 4/5  for BUE  Upper extremity exercise program 10 reps per handout, with supervision.     Outcome: Ongoing (interventions implemented as appropriate)  Patient with increased pain this date. Will benefit from continued skilled OT to address functional deficits.

## 2017-12-01 NOTE — ASSESSMENT & PLAN NOTE
S/p ex lap, Charles with SBR POD4  Clears, will go ahead and start soft diet.  Stop TPN if tolerates diet  BP stable, tachycardia improving  PT/OT  Will need SNF placement, case management working on it

## 2017-12-02 LAB
ANION GAP SERPL CALC-SCNC: 7 MMOL/L
BASOPHILS # BLD AUTO: 0.02 K/UL
BASOPHILS NFR BLD: 0.2 %
BUN SERPL-MCNC: 23 MG/DL
CALCIUM SERPL-MCNC: 8 MG/DL
CHLORIDE SERPL-SCNC: 100 MMOL/L
CO2 SERPL-SCNC: 28 MMOL/L
CREAT SERPL-MCNC: 0.7 MG/DL
DIFFERENTIAL METHOD: ABNORMAL
EOSINOPHIL # BLD AUTO: 0.1 K/UL
EOSINOPHIL NFR BLD: 0.7 %
ERYTHROCYTE [DISTWIDTH] IN BLOOD BY AUTOMATED COUNT: 13.9 %
EST. GFR  (AFRICAN AMERICAN): >60 ML/MIN/1.73 M^2
EST. GFR  (NON AFRICAN AMERICAN): >60 ML/MIN/1.73 M^2
GLUCOSE SERPL-MCNC: 172 MG/DL
HCT VFR BLD AUTO: 26.5 %
HGB BLD-MCNC: 8.9 G/DL
LYMPHOCYTES # BLD AUTO: 2 K/UL
LYMPHOCYTES NFR BLD: 15.9 %
MAGNESIUM SERPL-MCNC: 2 MG/DL
MCH RBC QN AUTO: 30 PG
MCHC RBC AUTO-ENTMCNC: 33.6 G/DL
MCV RBC AUTO: 89 FL
MONOCYTES # BLD AUTO: 1.1 K/UL
MONOCYTES NFR BLD: 8.4 %
NEUTROPHILS # BLD AUTO: 9.2 K/UL
NEUTROPHILS NFR BLD: 74 %
PHOSPHATE SERPL-MCNC: 3 MG/DL
PLATELET # BLD AUTO: 323 K/UL
PMV BLD AUTO: 8.3 FL
POTASSIUM SERPL-SCNC: 3.9 MMOL/L
RBC # BLD AUTO: 2.97 M/UL
SODIUM SERPL-SCNC: 135 MMOL/L
WBC # BLD AUTO: 12.48 K/UL

## 2017-12-02 PROCEDURE — 25000003 PHARM REV CODE 250: Performed by: HOSPITALIST

## 2017-12-02 PROCEDURE — 94761 N-INVAS EAR/PLS OXIMETRY MLT: CPT

## 2017-12-02 PROCEDURE — 25000003 PHARM REV CODE 250: Performed by: STUDENT IN AN ORGANIZED HEALTH CARE EDUCATION/TRAINING PROGRAM

## 2017-12-02 PROCEDURE — 94640 AIRWAY INHALATION TREATMENT: CPT

## 2017-12-02 PROCEDURE — 94799 UNLISTED PULMONARY SVC/PX: CPT

## 2017-12-02 PROCEDURE — 25000242 PHARM REV CODE 250 ALT 637 W/ HCPCS: Performed by: HOSPITALIST

## 2017-12-02 PROCEDURE — 99024 POSTOP FOLLOW-UP VISIT: CPT | Mod: ,,, | Performed by: SURGERY

## 2017-12-02 PROCEDURE — 83735 ASSAY OF MAGNESIUM: CPT

## 2017-12-02 PROCEDURE — 11000001 HC ACUTE MED/SURG PRIVATE ROOM

## 2017-12-02 PROCEDURE — 84100 ASSAY OF PHOSPHORUS: CPT

## 2017-12-02 PROCEDURE — 25000003 PHARM REV CODE 250: Performed by: SURGERY

## 2017-12-02 PROCEDURE — 63600175 PHARM REV CODE 636 W HCPCS: Performed by: STUDENT IN AN ORGANIZED HEALTH CARE EDUCATION/TRAINING PROGRAM

## 2017-12-02 PROCEDURE — 25000003 PHARM REV CODE 250: Performed by: PHYSICIAN ASSISTANT

## 2017-12-02 PROCEDURE — 85025 COMPLETE CBC W/AUTO DIFF WBC: CPT

## 2017-12-02 PROCEDURE — 80048 BASIC METABOLIC PNL TOTAL CA: CPT

## 2017-12-02 RX ADMIN — ENALAPRILAT 1.25 MG: 1.25 INJECTION, SOLUTION INTRAVENOUS at 12:12

## 2017-12-02 RX ADMIN — DONEPEZIL HYDROCHLORIDE 10 MG: 5 TABLET, FILM COATED ORAL at 09:12

## 2017-12-02 RX ADMIN — IPRATROPIUM BROMIDE AND ALBUTEROL SULFATE 3 ML: .5; 3 SOLUTION RESPIRATORY (INHALATION) at 07:12

## 2017-12-02 RX ADMIN — MEMANTINE HYDROCHLORIDE 10 MG: 5 TABLET ORAL at 09:12

## 2017-12-02 RX ADMIN — ENALAPRILAT 1.25 MG: 1.25 INJECTION, SOLUTION INTRAVENOUS at 05:12

## 2017-12-02 RX ADMIN — ENOXAPARIN SODIUM 40 MG: 100 INJECTION SUBCUTANEOUS at 09:12

## 2017-12-02 RX ADMIN — METHOCARBAMOL 500 MG: 500 TABLET ORAL at 12:12

## 2017-12-02 RX ADMIN — IPRATROPIUM BROMIDE AND ALBUTEROL SULFATE 3 ML: .5; 3 SOLUTION RESPIRATORY (INHALATION) at 12:12

## 2017-12-02 RX ADMIN — METHOCARBAMOL 500 MG: 500 TABLET ORAL at 11:12

## 2017-12-02 RX ADMIN — IPRATROPIUM BROMIDE AND ALBUTEROL SULFATE 3 ML: .5; 3 SOLUTION RESPIRATORY (INHALATION) at 01:12

## 2017-12-02 RX ADMIN — ENALAPRILAT 1.25 MG: 1.25 INJECTION, SOLUTION INTRAVENOUS at 07:12

## 2017-12-02 RX ADMIN — CHLORHEXIDINE GLUCONATE 15 ML: 1.2 RINSE ORAL at 10:12

## 2017-12-02 RX ADMIN — CHLORHEXIDINE GLUCONATE 15 ML: 1.2 RINSE ORAL at 09:12

## 2017-12-02 RX ADMIN — METHOCARBAMOL 500 MG: 500 TABLET ORAL at 05:12

## 2017-12-02 RX ADMIN — ACETAMINOPHEN 650 MG: 325 TABLET ORAL at 10:12

## 2017-12-02 RX ADMIN — MEMANTINE HYDROCHLORIDE 10 MG: 5 TABLET ORAL at 10:12

## 2017-12-02 RX ADMIN — METHOCARBAMOL 500 MG: 500 TABLET ORAL at 07:12

## 2017-12-02 RX ADMIN — ESCITALOPRAM OXALATE 20 MG: 20 TABLET, FILM COATED ORAL at 10:12

## 2017-12-02 RX ADMIN — LEUCINE, PHENYLALANINE, LYSINE, METHIONINE, ISOLEUCINE, VALINE, HISTIDINE, THREONINE, TRYPTOPHAN, ALANINE, GLYCINE, ARGININE, PROLINE, SERINE, TYROSINE, SODIUM ACETATE, DIBASIC POTASSIUM PHOSPHATE, MAGNESIUM CHLORIDE, SODIUM CHLORIDE, CALCIUM CHLORIDE, DEXTROSE
365; 280; 290; 200; 300; 290; 240; 210; 90; 1035; 515; 575; 340; 250; 20; 340; 261; 51; 59; 33; 20 INJECTION INTRAVENOUS at 09:12

## 2017-12-02 RX ADMIN — ENALAPRILAT 1.25 MG: 1.25 INJECTION, SOLUTION INTRAVENOUS at 11:12

## 2017-12-02 RX ADMIN — ASPIRIN 81 MG 81 MG: 81 TABLET ORAL at 10:12

## 2017-12-02 RX ADMIN — DONEPEZIL HYDROCHLORIDE 10 MG: 5 TABLET, FILM COATED ORAL at 10:12

## 2017-12-02 NOTE — SUBJECTIVE & OBJECTIVE
Interval History: No o/n events. TPN renewed last night. Dental soft diet, ate 30% this am. States appetite slowly returning. No n/v. No f/c. Passing some flatus. Small BM yesterday. On TPN    Medications:  Continuous Infusions:   Amino acid 5% - dextrose 20% (CLINIMIX-E) solution with additives (1L provides 50 gm AA, 200 gm CHO (680 kcal/L dextrose), Na 35, K 30, Mg 5, Ca 4.5, Acetate 80, Cl 39, Phos 15) 75 mL/hr at 12/01/17 2307     Scheduled Meds:   albuterol-ipratropium 2.5mg-0.5mg/3mL  3 mL Nebulization Q6H    aspirin  81 mg Oral Daily    chlorhexidine  15 mL Mouth/Throat BID    donepezil  10 mg Oral BID    enalaprilat  1.25 mg Intravenous Q6H    enoxaparin  40 mg Subcutaneous Q24H    escitalopram oxalate  20 mg Oral Daily    fat emulsion 20%  250 mL Intravenous Every Fri    memantine  10 mg Oral BID    methocarbamol  500 mg Oral QID     PRN Meds:acetaminophen, diphenhydrAMINE, hydrALAZINE, influenza, ondansetron, ramelteon, sodium chloride 0.9%     Review of patient's allergies indicates:  No Known Allergies  Objective:     Vital Signs (Most Recent):  Temp: 97.4 °F (36.3 °C) (12/02/17 0756)  Pulse: 82 (12/02/17 0756)  Resp: (!) 22 (12/02/17 0756)  BP: (!) 141/65 (12/02/17 0756)  SpO2: 97 % (12/02/17 0727) Vital Signs (24h Range):  Temp:  [96.5 °F (35.8 °C)-98.4 °F (36.9 °C)] 97.4 °F (36.3 °C)  Pulse:  [76-91] 82  Resp:  [18-22] 22  SpO2:  [93 %-98 %] 97 %  BP: (130-162)/(60-80) 141/65     Weight: 74.6 kg (164 lb 7.4 oz)  Body mass index is 29.13 kg/m².    Intake/Output - Last 3 Shifts       11/30 0700 - 12/01 0659 12/01 0700 - 12/02 0659 12/02 0700 - 12/03 0659    P.O. 425 360     NG/GT       TPN       Total Intake(mL/kg) 425 (5.7) 360 (4.8)     Urine (mL/kg/hr) 300 (0.2) 700 (0.4)     Emesis/NG output 1 (0)      Drains 200 (0.1)      Total Output 501 700      Net -76 -340             Urine Occurrence  1 x           Physical Exam  NAD, pleasant, AAOx3  No JVD  RRR  CTA b/l  Abd- soft, ND, staples  c/d/i, normal BS  Ext- no c/c/e    Significant Labs:  CBC:   Recent Labs  Lab 12/02/17  0435   WBC 12.48   RBC 2.97*   HGB 8.9*   HCT 26.5*      MCV 89   MCH 30.0   MCHC 33.6     CMP:   Recent Labs  Lab 12/01/17  1101 12/02/17  0435    172*   CALCIUM 8.0* 8.0*   ALBUMIN 1.8*  --    PROT 5.6*  --    * 135*   K 3.9 3.9   CO2 29 28   CL 98 100   BUN 21 23   CREATININE 0.7 0.7   ALKPHOS 131  --    ALT 98*  --    *  --    BILITOT 0.7  --      Microbiology Results (last 7 days)     ** No results found for the last 168 hours. **          Significant Diagnostics:  CXR 12/1/17 images and report personally reviewed.  The tip of the PICC line placed from the left upper extremity is noted to be in the level of the right mainstem bronchus possibly extending into the right atrium.  The endotracheal tube, the right PICC line and the enteric catheters have been removed since the previous study.  There is by basilar pulmonary infiltrates or atelectasis. Bilateral pleural effusions also suspected

## 2017-12-02 NOTE — PROGRESS NOTES
[facility]  General Surgery  Progress Note    Subjective:     History of Present Illness:  No notes on file    Post-Op Info:  Procedure(s) (LRB):  LAPAROTOMY-EXPLORATORY (N/A)  RESECTION-SMALL BOWEL   5 Days Post-Op     Interval History: No o/n events. TPN renewed last night. Dental soft diet, ate 30% this am. States appetite slowly returning. No n/v. No f/c. Passing some flatus. Small BM yesterday. On TPN    Medications:  Continuous Infusions:   Amino acid 5% - dextrose 20% (CLINIMIX-E) solution with additives (1L provides 50 gm AA, 200 gm CHO (680 kcal/L dextrose), Na 35, K 30, Mg 5, Ca 4.5, Acetate 80, Cl 39, Phos 15) 75 mL/hr at 12/01/17 2307     Scheduled Meds:   albuterol-ipratropium 2.5mg-0.5mg/3mL  3 mL Nebulization Q6H    aspirin  81 mg Oral Daily    chlorhexidine  15 mL Mouth/Throat BID    donepezil  10 mg Oral BID    enalaprilat  1.25 mg Intravenous Q6H    enoxaparin  40 mg Subcutaneous Q24H    escitalopram oxalate  20 mg Oral Daily    fat emulsion 20%  250 mL Intravenous Every Fri    memantine  10 mg Oral BID    methocarbamol  500 mg Oral QID     PRN Meds:acetaminophen, diphenhydrAMINE, hydrALAZINE, influenza, ondansetron, ramelteon, sodium chloride 0.9%     Review of patient's allergies indicates:  No Known Allergies  Objective:     Vital Signs (Most Recent):  Temp: 97.4 °F (36.3 °C) (12/02/17 0756)  Pulse: 82 (12/02/17 0756)  Resp: (!) 22 (12/02/17 0756)  BP: (!) 141/65 (12/02/17 0756)  SpO2: 97 % (12/02/17 0727) Vital Signs (24h Range):  Temp:  [96.5 °F (35.8 °C)-98.4 °F (36.9 °C)] 97.4 °F (36.3 °C)  Pulse:  [76-91] 82  Resp:  [18-22] 22  SpO2:  [93 %-98 %] 97 %  BP: (130-162)/(60-80) 141/65     Weight: 74.6 kg (164 lb 7.4 oz)  Body mass index is 29.13 kg/m².    Intake/Output - Last 3 Shifts       11/30 0700 - 12/01 0659 12/01 0700 - 12/02 0659 12/02 0700 - 12/03 0659    P.O. 425 360     NG/GT       TPN       Total Intake(mL/kg) 425 (5.7) 360 (4.8)     Urine (mL/kg/hr)  300 (0.2) 700 (0.4)     Emesis/NG output 1 (0)      Drains 200 (0.1)      Total Output 501 700      Net -76 -340             Urine Occurrence  1 x           Physical Exam  NAD, pleasant, AAOx3  No JVD  RRR  CTA b/l  Abd- soft, ND, staples c/d/i, normal BS  Ext- no c/c/e    Significant Labs:  CBC:   Recent Labs  Lab 12/02/17  0435   WBC 12.48   RBC 2.97*   HGB 8.9*   HCT 26.5*      MCV 89   MCH 30.0   MCHC 33.6     CMP:   Recent Labs  Lab 12/01/17  1101 12/02/17  0435    172*   CALCIUM 8.0* 8.0*   ALBUMIN 1.8*  --    PROT 5.6*  --    * 135*   K 3.9 3.9   CO2 29 28   CL 98 100   BUN 21 23   CREATININE 0.7 0.7   ALKPHOS 131  --    ALT 98*  --    *  --    BILITOT 0.7  --      Microbiology Results (last 7 days)     ** No results found for the last 168 hours. **          Significant Diagnostics:  CXR 12/1/17 images and report personally reviewed.  The tip of the PICC line placed from the left upper extremity is noted to be in the level of the right mainstem bronchus possibly extending into the right atrium.  The endotracheal tube, the right PICC line and the enteric catheters have been removed since the previous study.  There is by basilar pulmonary infiltrates or atelectasis. Bilateral pleural effusions also suspected    Assessment/Plan:     * SBO (small bowel obstruction)    S/p ex lap, Charles with SBR POD5  Zayra dental soft (30% intake)  Will add Boost and renew TPN 1 more day  BP stable, tachycardia resolved  PT/OT  Increased frequency of RT/IS (atelectasis on CXR 12/1/17)  D/c planning- likely will need [facility]            Suzette Vann DO  General Surgery  [facility]

## 2017-12-02 NOTE — ASSESSMENT & PLAN NOTE
S/p ex lap, Charles with SBR POD5  Zayra dental soft (30% intake)  Will add Boost and renew TPN 1 more day  BP stable, tachycardia resolved  PT/OT  Increased frequency of RT/IS (atelectasis on CXR 12/1/17)  D/c planning- likely will need SNF

## 2017-12-03 LAB
ANION GAP SERPL CALC-SCNC: 7 MMOL/L
BASOPHILS # BLD AUTO: 0.03 K/UL
BASOPHILS NFR BLD: 0.2 %
BUN SERPL-MCNC: 19 MG/DL
CALCIUM SERPL-MCNC: 8.2 MG/DL
CHLORIDE SERPL-SCNC: 106 MMOL/L
CO2 SERPL-SCNC: 27 MMOL/L
CREAT SERPL-MCNC: 0.7 MG/DL
DIFFERENTIAL METHOD: ABNORMAL
EOSINOPHIL # BLD AUTO: 0.1 K/UL
EOSINOPHIL NFR BLD: 0.5 %
ERYTHROCYTE [DISTWIDTH] IN BLOOD BY AUTOMATED COUNT: 13.7 %
EST. GFR  (AFRICAN AMERICAN): >60 ML/MIN/1.73 M^2
EST. GFR  (NON AFRICAN AMERICAN): >60 ML/MIN/1.73 M^2
GLUCOSE SERPL-MCNC: 152 MG/DL
HCT VFR BLD AUTO: 27.1 %
HGB BLD-MCNC: 9 G/DL
LYMPHOCYTES # BLD AUTO: 1.8 K/UL
LYMPHOCYTES NFR BLD: 14.5 %
MAGNESIUM SERPL-MCNC: 2 MG/DL
MCH RBC QN AUTO: 29.4 PG
MCHC RBC AUTO-ENTMCNC: 33.2 G/DL
MCV RBC AUTO: 89 FL
MONOCYTES # BLD AUTO: 0.9 K/UL
MONOCYTES NFR BLD: 7.2 %
NEUTROPHILS # BLD AUTO: 9.3 K/UL
NEUTROPHILS NFR BLD: 76.4 %
PHOSPHATE SERPL-MCNC: 3.1 MG/DL
PLATELET # BLD AUTO: 323 K/UL
PMV BLD AUTO: 8.1 FL
POTASSIUM SERPL-SCNC: 3.9 MMOL/L
RBC # BLD AUTO: 3.06 M/UL
SODIUM SERPL-SCNC: 140 MMOL/L
WBC # BLD AUTO: 12.16 K/UL

## 2017-12-03 PROCEDURE — 97110 THERAPEUTIC EXERCISES: CPT

## 2017-12-03 PROCEDURE — 25000003 PHARM REV CODE 250: Performed by: PHYSICIAN ASSISTANT

## 2017-12-03 PROCEDURE — 80048 BASIC METABOLIC PNL TOTAL CA: CPT

## 2017-12-03 PROCEDURE — 25000242 PHARM REV CODE 250 ALT 637 W/ HCPCS: Performed by: HOSPITALIST

## 2017-12-03 PROCEDURE — 94640 AIRWAY INHALATION TREATMENT: CPT

## 2017-12-03 PROCEDURE — 94761 N-INVAS EAR/PLS OXIMETRY MLT: CPT

## 2017-12-03 PROCEDURE — 83735 ASSAY OF MAGNESIUM: CPT

## 2017-12-03 PROCEDURE — 11000001 HC ACUTE MED/SURG PRIVATE ROOM

## 2017-12-03 PROCEDURE — 25000003 PHARM REV CODE 250: Performed by: HOSPITALIST

## 2017-12-03 PROCEDURE — 84100 ASSAY OF PHOSPHORUS: CPT

## 2017-12-03 PROCEDURE — 25000003 PHARM REV CODE 250: Performed by: STUDENT IN AN ORGANIZED HEALTH CARE EDUCATION/TRAINING PROGRAM

## 2017-12-03 PROCEDURE — 63600175 PHARM REV CODE 636 W HCPCS: Performed by: STUDENT IN AN ORGANIZED HEALTH CARE EDUCATION/TRAINING PROGRAM

## 2017-12-03 PROCEDURE — 97116 GAIT TRAINING THERAPY: CPT

## 2017-12-03 PROCEDURE — 94799 UNLISTED PULMONARY SVC/PX: CPT

## 2017-12-03 PROCEDURE — 85025 COMPLETE CBC W/AUTO DIFF WBC: CPT

## 2017-12-03 RX ADMIN — METHOCARBAMOL 500 MG: 500 TABLET ORAL at 01:12

## 2017-12-03 RX ADMIN — DONEPEZIL HYDROCHLORIDE 10 MG: 5 TABLET, FILM COATED ORAL at 09:12

## 2017-12-03 RX ADMIN — ENOXAPARIN SODIUM 40 MG: 100 INJECTION SUBCUTANEOUS at 08:12

## 2017-12-03 RX ADMIN — ENALAPRILAT 1.25 MG: 1.25 INJECTION, SOLUTION INTRAVENOUS at 11:12

## 2017-12-03 RX ADMIN — IPRATROPIUM BROMIDE AND ALBUTEROL SULFATE 3 ML: .5; 3 SOLUTION RESPIRATORY (INHALATION) at 07:12

## 2017-12-03 RX ADMIN — ENALAPRILAT 1.25 MG: 1.25 INJECTION, SOLUTION INTRAVENOUS at 06:12

## 2017-12-03 RX ADMIN — MEMANTINE HYDROCHLORIDE 10 MG: 5 TABLET ORAL at 08:12

## 2017-12-03 RX ADMIN — ASPIRIN 81 MG 81 MG: 81 TABLET ORAL at 09:12

## 2017-12-03 RX ADMIN — ENALAPRILAT 1.25 MG: 1.25 INJECTION, SOLUTION INTRAVENOUS at 01:12

## 2017-12-03 RX ADMIN — METHOCARBAMOL 500 MG: 500 TABLET ORAL at 11:12

## 2017-12-03 RX ADMIN — CHLORHEXIDINE GLUCONATE 15 ML: 1.2 RINSE ORAL at 08:12

## 2017-12-03 RX ADMIN — IPRATROPIUM BROMIDE AND ALBUTEROL SULFATE 3 ML: .5; 3 SOLUTION RESPIRATORY (INHALATION) at 01:12

## 2017-12-03 RX ADMIN — CHLORHEXIDINE GLUCONATE 15 ML: 1.2 RINSE ORAL at 09:12

## 2017-12-03 RX ADMIN — ESCITALOPRAM OXALATE 20 MG: 20 TABLET, FILM COATED ORAL at 09:12

## 2017-12-03 RX ADMIN — ENALAPRILAT 1.25 MG: 1.25 INJECTION, SOLUTION INTRAVENOUS at 05:12

## 2017-12-03 RX ADMIN — MEMANTINE HYDROCHLORIDE 10 MG: 5 TABLET ORAL at 09:12

## 2017-12-03 RX ADMIN — DONEPEZIL HYDROCHLORIDE 10 MG: 5 TABLET, FILM COATED ORAL at 08:12

## 2017-12-03 RX ADMIN — METHOCARBAMOL 500 MG: 500 TABLET ORAL at 05:12

## 2017-12-03 RX ADMIN — METHOCARBAMOL 500 MG: 500 TABLET ORAL at 06:12

## 2017-12-03 RX ADMIN — IPRATROPIUM BROMIDE AND ALBUTEROL SULFATE 3 ML: .5; 3 SOLUTION RESPIRATORY (INHALATION) at 12:12

## 2017-12-03 NOTE — PT/OT/SLP PROGRESS
Physical Therapy  Treatment    Lesa Holder   MRN: [id number]   Admitting Diagnosis: SBO (small bowel obstruction)    PT Received On: 12/03/17  PT Start Time: 0950     PT Stop Time: 1023    PT Total Time (min): 33 min       Billable Minutes:  Gait Training 16 minutes and Therapeutic Exercise 17 minutes    Treatment Type: Treatment  PT/PTA: PT     PTA Visit Number: 0       General Precautions: Standard, fall  Orthopedic Precautions: N/A   Braces: N/A    Do you have any cultural, spiritual, Cheondoism conflicts, given your current situation?: None verbalized to PT    Subjective:  Communicated with nurse prior to session.  Patient more animated, alert; voice strong today    Pain/Comfort  Pain Rating 1: 0/10  Pain Rating Post-Intervention 1: 0/10    Objective:   Patient found with: PICC line, telemetry  Pt sitting in bedside chair  Functional Mobility:  Bed Mobility:   Did not occur    Transfers:  Sit <> Stand Assistance: Contact Guard Assistance  Sit <> Stand Assistive Device: Rolling Walker    Gait:   Gait Distance: 20ft to [facility] x 8 min, then 20ft back to bedside chair  Assistance 1: Contact Guard Assistance  Gait Assistive Device: Rolling walker  Gait Pattern: reciprocal  Gait Deviation(s): decreased yoanna, decreased step length, decreased stride length (short, quick steps with lateral sway Simón)    Balance:   Static Sit: GOOD: Takes MODERATE challenges from all directions  Dynamic Sit: GOOD: Maintains balance through MODERATE excursions of active trunk movement  Static Stand: FAIR: Maintains without assist but unable to take challenges  Dynamic stand: FAIR: Needs CONTACT GUARD during gait     Therapeutic Activities and Exercises:  Patient much more alert and animated today; able to perform seated ex for LEs 10-15 reps APs, ankle circles, FAQ, hip abd/add, marches, trunk totation x 5 each direction with 5 sec hold, and alternate UE reaches with sidebending trunk x 5 each direction; sit to stand with CGA, amb  20ft to sink, cleansed teeth -standing for 8 min then amb 20ft back to bedside chair; short, quick steps with a lateral sway; fatigues quickly and HR increases to 125 with activity- mild to mod SOB eased with pursed lip breathing; O2 sats 97% initially and 99% post session with HR initially 106, then 108 post session with rest     AM-PAC 6 CLICK MOBILITY  How much help from another person does this patient currently need?   1 = Unable, Total/Dependent Assistance  2 = A lot, Maximum/Moderate Assistance  3 = A little, Minimum/Contact Guard/Supervision  4 = None, Modified [place]/Independent    Turning over in bed (including adjusting bedclothes, sheets and blankets)?: 3  Sitting down on and standing up from a chair with arms (e.g., wheelchair, bedside commode, etc.): 3  Moving from lying on back to sitting on the side of the bed?: 3  Moving to and from a bed to a chair (including a wheelchair)?: 3  Need to walk in hospital room?: 3  Climbing 3-5 steps with a railing?: 1  Total Score: 16    AM-PAC Raw Score CMS G-Code Modifier Level of Impairment Assistance   6 % Total / Unable   7 - 9 CM 80 - 100% Maximal Assist   10 - 14 CL 60 - 80% Moderate Assist   15 - 19 CK 40 - 60% Moderate Assist   20 - 22 CJ 20 - 40% Minimal Assist   23 CI 1-20% SBA / CGA   24 CH 0% Independent/ Mod I     Patient left up in chair with all lines intact, call button in reach and nurse notified.    Assessment:  Lesa Holder is a 89 y.o. female with a medical diagnosis of SBO (small bowel obstruction) Pt with improved general strength; increased HR with activity, high resting HR; fatigues with minimal activity and mild SOB; will cont with POC    Rehab identified problem list/impairments: Rehab identified problem list/impairments: weakness, impaired endurance, gait instability, impaired balance, impaired self care skills, impaired functional mobilty, impaired cardiopulmonary response to activity    Rehab potential is  good.    Activity tolerance: Good- 2/2 fatigue, elevated HR    Discharge recommendations: Discharge Facility/Level Of Care Needs: nursing facility, skilled     Barriers to discharge: Barriers to Discharge: Decreased caregiver support    Equipment recommendations: Equipment Needed After Discharge:  (TBD)     GOALS:    Physical Therapy Goals        Problem: Physical Therapy Goal    Goal Priority Disciplines Outcome Goal Variances Interventions   Physical Therapy Goal     PT/OT, PT Ongoing (interventions implemented as appropriate)     Description:  Goals to be met by: 2017  Patient will increase functional independence with mobility by performin. Supine <> sit with [place]  2. Sit <> stand transfer with Modified [place] with/without RW  3. Gait  x 150 feet with Modified [place] using Rolling Walker.                        PLAN:    Patient to be seen 6 x/week  to address the above listed problems via gait training, therapeutic activities, therapeutic exercises  Plan of Care expires: 17  Plan of Care reviewed with: patient         Lupe Sebastian, PT  2017

## 2017-12-03 NOTE — SUBJECTIVE & OBJECTIVE
Interval History: No o/n events. On soft diet. Appetite improving. Multiple BM, pt sates BM becoming more formed. Min pain. No n/v. Pt sitting up in chair when seen this am, looked comfortable.    Medications:  Continuous Infusions:   Amino acid 5% - dextrose 20% (CLINIMIX-E) solution with additives (1L provides 50 gm AA, 200 gm CHO (680 kcal/L dextrose), Na 35, K 30, Mg 5, Ca 4.5, Acetate 80, Cl 39, Phos 15) 75 mL/hr at 12/02/17 2140     Scheduled Meds:   albuterol-ipratropium 2.5mg-0.5mg/3mL  3 mL Nebulization Q6H    aspirin  81 mg Oral Daily    chlorhexidine  15 mL Mouth/Throat BID    donepezil  10 mg Oral BID    enalaprilat  1.25 mg Intravenous Q6H    enoxaparin  40 mg Subcutaneous Q24H    escitalopram oxalate  20 mg Oral Daily    memantine  10 mg Oral BID    methocarbamol  500 mg Oral QID     PRN Meds:acetaminophen, diphenhydrAMINE, hydrALAZINE, influenza, ondansetron, ramelteon, sodium chloride 0.9%     Review of patient's allergies indicates:  No Known Allergies  Objective:     Vital Signs (Most Recent):  Temp: 97.7 °F (36.5 °C) (12/03/17 0819)  Pulse: 102 (12/03/17 0819)  Resp: 18 (12/03/17 0819)  BP: (!) 175/72 (12/03/17 0819)  SpO2: 98 % (12/03/17 0723) Vital Signs (24h Range):  Temp:  [97 °F (36.1 °C)-98.1 °F (36.7 °C)] 97.7 °F (36.5 °C)  Pulse:  [] 102  Resp:  [18-22] 18  SpO2:  [93 %-98 %] 98 %  BP: (131-175)/(59-72) 175/72     Weight: 75.2 kg (165 lb 12.6 oz)  Body mass index is 29.37 kg/m².    Intake/Output - Last 3 Shifts       12/01 0700 - 12/02 0659 12/02 0700 - 12/03 0659 12/03 0700 - 12/04 0659    P.O. 360 500.5     TPN  900     Total Intake(mL/kg) 360 (4.8) 1400.5 (18.6)     Urine (mL/kg/hr) 700 (0.4) 621 (0.3) 200 (0.7)    Emesis/NG output       Drains       Stool  0 (0)     Total Output 700 621 200    Net -340 +779.5 -200           Urine Occurrence 1 x      Stool Occurrence  5 x           Physical Exam   NAD, pleasant, AAOx3  RRR  CTA b/l  Abd- soft, ND, normal BM, staples  c/d/i, hyperpigmentation around stapleline (nttp, no flunctance or drainage)  Ext- no c/c/e       Significant Labs:  CBC:   Recent Labs  Lab 12/03/17  0557   WBC 12.16   RBC 3.06*   HGB 9.0*   HCT 27.1*      MCV 89   MCH 29.4   MCHC 33.2     CMP:   Recent Labs  Lab 12/01/17  1101  12/03/17  0557     < > 152*   CALCIUM 8.0*  < > 8.2*   ALBUMIN 1.8*  --   --    PROT 5.6*  --   --    *  < > 140   K 3.9  < > 3.9   CO2 29  < > 27   CL 98  < > 106   BUN 21  < > 19   CREATININE 0.7  < > 0.7   ALKPHOS 131  --   --    ALT 98*  --   --    *  --   --    BILITOT 0.7  --   --    < > = values in this interval not displayed.    Significant Diagnostics:  path - pending

## 2017-12-03 NOTE — PLAN OF CARE
Problem: Patient Care Overview  Goal: Plan of Care Review  Outcome: Ongoing (interventions implemented as appropriate)  Pt disoriented to place and situation intermittently, no family at bedside throughout shift. Pt denies pain, n/v/d, and SOB. TPN infusing to PICC - dressing CDI. Pt up to BSC with assist and had a large BM overnight. Cardiac monitoring continued. Safety maintained, bed alarm on - will cont to monitor.

## 2017-12-03 NOTE — PROGRESS NOTES
[facility]  General Surgery  Progress Note    Subjective:     History of Present Illness:  No notes on file    Post-Op Info:  Procedure(s) (LRB):  LAPAROTOMY-EXPLORATORY (N/A)  RESECTION-SMALL BOWEL   6 Days Post-Op     Interval History: No o/n events. On soft diet. Appetite improving. Multiple BM, pt sates BM becoming more formed. Min pain. No n/v. Pt sitting up in chair when seen this am, looked comfortable.    Medications:  Continuous Infusions:   Amino acid 5% - dextrose 20% (CLINIMIX-E) solution with additives (1L provides 50 gm AA, 200 gm CHO (680 kcal/L dextrose), Na 35, K 30, Mg 5, Ca 4.5, Acetate 80, Cl 39, Phos 15) 75 mL/hr at 12/02/17 2140     Scheduled Meds:   albuterol-ipratropium 2.5mg-0.5mg/3mL  3 mL Nebulization Q6H    aspirin  81 mg Oral Daily    chlorhexidine  15 mL Mouth/Throat BID    donepezil  10 mg Oral BID    enalaprilat  1.25 mg Intravenous Q6H    enoxaparin  40 mg Subcutaneous Q24H    escitalopram oxalate  20 mg Oral Daily    memantine  10 mg Oral BID    methocarbamol  500 mg Oral QID     PRN Meds:acetaminophen, diphenhydrAMINE, hydrALAZINE, influenza, ondansetron, ramelteon, sodium chloride 0.9%     Review of patient's allergies indicates:  No Known Allergies  Objective:     Vital Signs (Most Recent):  Temp: 97.7 °F (36.5 °C) (12/03/17 0819)  Pulse: 102 (12/03/17 0819)  Resp: 18 (12/03/17 0819)  BP: (!) 175/72 (12/03/17 0819)  SpO2: 98 % (12/03/17 0723) Vital Signs (24h Range):  Temp:  [97 °F (36.1 °C)-98.1 °F (36.7 °C)] 97.7 °F (36.5 °C)  Pulse:  [] 102  Resp:  [18-22] 18  SpO2:  [93 %-98 %] 98 %  BP: (131-175)/(59-72) 175/72     Weight: 75.2 kg (165 lb 12.6 oz)  Body mass index is 29.37 kg/m².    Intake/Output - Last 3 Shifts       12/01 0700 - 12/02 0659 12/02 0700 - 12/03 0659 12/03 0700 - 12/04 0659    P.O. 360 500.5     TPN  900     Total Intake(mL/kg) 360 (4.8) 1400.5 (18.6)     Urine (mL/kg/hr) 700 (0.4) 621 (0.3) 200 (0.7)    Emesis/NG output        Drains       Stool  0 (0)     Total Output 700 621 200    Net -340 +779.5 -200           Urine Occurrence 1 x      Stool Occurrence  5 x           Physical Exam   NAD, pleasant, AAOx3  RRR  CTA b/l  Abd- soft, ND, normal BM, staples c/d/i, hyperpigmentation around stapleline (nttp, no flunctance or drainage)  Ext- no c/c/e       Significant Labs:  CBC:   Recent Labs  Lab 12/03/17  0557   WBC 12.16   RBC 3.06*   HGB 9.0*   HCT 27.1*      MCV 89   MCH 29.4   MCHC 33.2     CMP:   Recent Labs  Lab 12/01/17  1101  12/03/17  0557     < > 152*   CALCIUM 8.0*  < > 8.2*   ALBUMIN 1.8*  --   --    PROT 5.6*  --   --    *  < > 140   K 3.9  < > 3.9   CO2 29  < > 27   CL 98  < > 106   BUN 21  < > 19   CREATININE 0.7  < > 0.7   ALKPHOS 131  --   --    ALT 98*  --   --    *  --   --    BILITOT 0.7  --   --    < > = values in this interval not displayed.    Significant Diagnostics:  path - pending    Assessment/Plan:     * SBO (small bowel obstruction)    POD 6 s/p exlap, Charles with SBR   Pain controlled  Monitor lennox-wound hyperpigmentation (ecchymosis vs cellulitis)   No antibiotics for now  Zayra dental soft , Boost added 12/2  TPN renewed [id number], will not renew again as appetite improved  BP stable, tachycardia resolved  PT/OT  Increased frequency of RT/IS (atelectasis on CXR 12/1/17)  D/c planning- likely will need SNF although patient indicates that she would like to go home            Suzette Vann, DO  General Surgery  [facility]

## 2017-12-03 NOTE — PLAN OF CARE
Problem: Physical Therapy Goal  Goal: Physical Therapy Goal  Goals to be met by: 2017  Patient will increase functional independence with mobility by performin. Supine <> sit with [place]  2. Sit <> stand transfer with Modified [place] with/without RW  3. Gait  x 150 feet with Modified [place] using Rolling Walker.       Outcome: Ongoing (interventions implemented as appropriate)  Patient much more alert and animated today; able to perform seated ex for LEs, trunk and UEs; sit to stand with CGA, amb 20ft to sink, cleansed teeth -standing for 8 min then amb 20ft back to bedside chair; short, quick steps with a lateral sway; fatigues quickly and HR increases to 125 with activity- mild to mod SOB eased with pursed lip breathing; O2 sats 97% initially and 99% post session with HR initially 106, then 108 post session with rest; will cont with POC.

## 2017-12-03 NOTE — ASSESSMENT & PLAN NOTE
POD 6 s/p exlap, Charles with SBR   Pain controlled  Monitor lennox-wound hyperpigmentation (ecchymosis vs cellulitis)   No antibiotics for now  Zayra dental soft , Boost added 12/2  TPN renewed [id number], will not renew again as appetite improved  BP stable, tachycardia resolved  PT/OT  Increased frequency of RT/IS (atelectasis on CXR 12/1/17)  D/c planning- likely will need SNF although patient indicates that she would like to go home

## 2017-12-04 VITALS
SYSTOLIC BLOOD PRESSURE: 140 MMHG | BODY MASS INDEX: 29.23 KG/M2 | HEIGHT: 63 IN | OXYGEN SATURATION: 96 % | DIASTOLIC BLOOD PRESSURE: 60 MMHG | TEMPERATURE: 98 F | WEIGHT: 165 LBS | HEART RATE: 77 BPM | RESPIRATION RATE: 18 BRPM

## 2017-12-04 LAB
ANION GAP SERPL CALC-SCNC: 7 MMOL/L
BASOPHILS # BLD AUTO: 0.03 K/UL
BASOPHILS NFR BLD: 0.2 %
BUN SERPL-MCNC: 18 MG/DL
CALCIUM SERPL-MCNC: 8.1 MG/DL
CHLORIDE SERPL-SCNC: 105 MMOL/L
CO2 SERPL-SCNC: 24 MMOL/L
CREAT SERPL-MCNC: 0.6 MG/DL
DIFFERENTIAL METHOD: ABNORMAL
EOSINOPHIL # BLD AUTO: 0.1 K/UL
EOSINOPHIL NFR BLD: 0.6 %
ERYTHROCYTE [DISTWIDTH] IN BLOOD BY AUTOMATED COUNT: 14 %
EST. GFR  (AFRICAN AMERICAN): >60 ML/MIN/1.73 M^2
EST. GFR  (NON AFRICAN AMERICAN): >60 ML/MIN/1.73 M^2
GLUCOSE SERPL-MCNC: 100 MG/DL
HCT VFR BLD AUTO: 26.5 %
HGB BLD-MCNC: 8.8 G/DL
LYMPHOCYTES # BLD AUTO: 2.1 K/UL
LYMPHOCYTES NFR BLD: 12.9 %
MAGNESIUM SERPL-MCNC: 1.9 MG/DL
MCH RBC QN AUTO: 29.8 PG
MCHC RBC AUTO-ENTMCNC: 33.2 G/DL
MCV RBC AUTO: 90 FL
MONOCYTES # BLD AUTO: 1 K/UL
MONOCYTES NFR BLD: 6.4 %
NEUTROPHILS # BLD AUTO: 12.6 K/UL
NEUTROPHILS NFR BLD: 79.3 %
PHOSPHATE SERPL-MCNC: 3.2 MG/DL
PLATELET # BLD AUTO: 368 K/UL
PMV BLD AUTO: 8.3 FL
POTASSIUM SERPL-SCNC: 3.9 MMOL/L
RBC # BLD AUTO: 2.95 M/UL
SODIUM SERPL-SCNC: 136 MMOL/L
TB INDURATION 48 - 72 HR READ: 0 MM
WBC # BLD AUTO: 15.9 K/UL

## 2017-12-04 PROCEDURE — 83735 ASSAY OF MAGNESIUM: CPT

## 2017-12-04 PROCEDURE — 94799 UNLISTED PULMONARY SVC/PX: CPT

## 2017-12-04 PROCEDURE — 25000003 PHARM REV CODE 250: Performed by: STUDENT IN AN ORGANIZED HEALTH CARE EDUCATION/TRAINING PROGRAM

## 2017-12-04 PROCEDURE — 94761 N-INVAS EAR/PLS OXIMETRY MLT: CPT

## 2017-12-04 PROCEDURE — 94640 AIRWAY INHALATION TREATMENT: CPT

## 2017-12-04 PROCEDURE — 97535 SELF CARE MNGMENT TRAINING: CPT

## 2017-12-04 PROCEDURE — 85025 COMPLETE CBC W/AUTO DIFF WBC: CPT

## 2017-12-04 PROCEDURE — 25000003 PHARM REV CODE 250: Performed by: HOSPITALIST

## 2017-12-04 PROCEDURE — 80048 BASIC METABOLIC PNL TOTAL CA: CPT

## 2017-12-04 PROCEDURE — 97530 THERAPEUTIC ACTIVITIES: CPT

## 2017-12-04 PROCEDURE — 97116 GAIT TRAINING THERAPY: CPT

## 2017-12-04 PROCEDURE — 97110 THERAPEUTIC EXERCISES: CPT

## 2017-12-04 PROCEDURE — 25000242 PHARM REV CODE 250 ALT 637 W/ HCPCS: Performed by: HOSPITALIST

## 2017-12-04 PROCEDURE — 84100 ASSAY OF PHOSPHORUS: CPT

## 2017-12-04 PROCEDURE — 25000003 PHARM REV CODE 250: Performed by: PHYSICIAN ASSISTANT

## 2017-12-04 RX ORDER — OXYCODONE AND ACETAMINOPHEN 5; 325 MG/1; MG/1
1 TABLET ORAL EVERY 4 HOURS PRN
Qty: 20 TABLET | Refills: 0 | Status: SHIPPED | OUTPATIENT
Start: 2017-12-04 | End: 2018-02-07

## 2017-12-04 RX ADMIN — ENALAPRILAT 1.25 MG: 1.25 INJECTION, SOLUTION INTRAVENOUS at 05:12

## 2017-12-04 RX ADMIN — IPRATROPIUM BROMIDE AND ALBUTEROL SULFATE 3 ML: .5; 3 SOLUTION RESPIRATORY (INHALATION) at 07:12

## 2017-12-04 RX ADMIN — ESCITALOPRAM OXALATE 20 MG: 20 TABLET, FILM COATED ORAL at 09:12

## 2017-12-04 RX ADMIN — ACETAMINOPHEN 650 MG: 325 TABLET ORAL at 09:12

## 2017-12-04 RX ADMIN — ENALAPRILAT 1.25 MG: 1.25 INJECTION, SOLUTION INTRAVENOUS at 12:12

## 2017-12-04 RX ADMIN — IPRATROPIUM BROMIDE AND ALBUTEROL SULFATE 3 ML: .5; 3 SOLUTION RESPIRATORY (INHALATION) at 12:12

## 2017-12-04 RX ADMIN — CHLORHEXIDINE GLUCONATE 15 ML: 1.2 RINSE ORAL at 09:12

## 2017-12-04 RX ADMIN — METHOCARBAMOL 500 MG: 500 TABLET ORAL at 12:12

## 2017-12-04 RX ADMIN — IPRATROPIUM BROMIDE AND ALBUTEROL SULFATE 3 ML: .5; 3 SOLUTION RESPIRATORY (INHALATION) at 02:12

## 2017-12-04 RX ADMIN — METHOCARBAMOL 500 MG: 500 TABLET ORAL at 05:12

## 2017-12-04 RX ADMIN — ASPIRIN 81 MG 81 MG: 81 TABLET ORAL at 09:12

## 2017-12-04 RX ADMIN — MEMANTINE HYDROCHLORIDE 10 MG: 5 TABLET ORAL at 09:12

## 2017-12-04 RX ADMIN — DONEPEZIL HYDROCHLORIDE 10 MG: 5 TABLET, FILM COATED ORAL at 09:12

## 2017-12-04 NOTE — PT/OT/SLP PROGRESS
Occupational Therapy   Treatment    Name: Lesa Holder  MRN: [id number]  Admitting Diagnosis:  SBO (small bowel obstruction)  7 Days Post-Op    Recommendations:     Discharge Recommendations: nursing facility, skilled  Discharge Equipment Recommendations:  none  Barriers to discharge:  Decreased caregiver support    Subjective     Communicated with: nurseLiyah prior to session.  Pain/Comfort:  · Pain Rating 1: 0/10  · Pain Rating Post-Intervention 1: 0/10    Objective:     Patient found with: telemetry, PICC line    Occupational Performance:    Bed Mobility:    · Patient completed Scooting/Bridging with contact guard assistance  · Patient completed Supine to Sit with minimum assistance     Functional Mobility/Transfers:  · Patient completed Bed <> Chair Transfer using Step Transfer technique with minimum assistance with no assistive device    Activities of Daily Living:  · Feeding:  supervision (set-up)  · Grooming: supervision      Patient left up in chair with all lines intact, call button in reach, RN notified and family members and [employer] tele-monitor present    [facility] 6 Click:  [facility] Total Score: 16    Treatment & Education:  Patient with bed mob as noted above. Patient slightly impulsive and stood abruptly with CGA, but required Min (A) to step to bedside chair. Decreased balance and stability noted without use of AD. Patient completed G/H tasks with sup. Patient encouraged to increase PO intake. Set-up assist for lunch tray.   Education:    Assessment:   Patient with improved alertness, bed mobility and functional transitions this date. Some decreased safety awareness and impulsivity noted. Patient will benefit from continued skilled OT to address functional deficits.     Lesa Holder is a 89 y.o. female with a medical diagnosis of SBO (small bowel obstruction).  Performance deficits affecting function are weakness, impaired endurance, impaired self care skills, impaired functional mobilty, gait  instability, impaired balance, decreased safety awareness.      Rehab Prognosis:  Good; patient would benefit from acute skilled OT services to address these deficits and reach maximum level of function.       Plan:     Patient to be seen 5 x/week to address the above listed problems via self-care/home management, therapeutic activities, therapeutic exercises  · Plan of Care Expires: 12/30/17  · Plan of Care Reviewed with: patient, spouse, son    This Plan of care has been discussed with the patient who was involved in its development and understands and is in agreement with the identified goals and treatment plan    GOALS:    Occupational Therapy Goals        Problem: Occupational Therapy Goal    Goal Priority Disciplines Outcome Interventions   Occupational Therapy Goal     OT, PT/OT Ongoing (interventions implemented as appropriate)    Description:  Goals to be met by: 12/10/2017    Patient will increase functional independence with ADLs by performing:    UE Dressing with Set-up Assistance.  LE Dressing with Minimal Assistance with adaptive devices  Grooming while seated at sink with Supervision.  Toileting from bedside commode with Supervision for hygiene and clothing management.   Supine to sit with Modified [place].  Stand pivot transfers with Supervision.  Toilet transfer to bedside commode with Supervision.  Increased functional strength to 4/5  for BUE  Upper extremity exercise program 10 reps per handout, with supervision.                      Time Tracking:     OT Date of Treatment: 12/04/17  OT Start Time: 1156  OT Stop Time: 1219  OT Total Time (min): 23 min    Billable Minutes:Self Care/Home Management 13  Therapeutic Activity 10    BRANDON Velasquez  12/4/2017

## 2017-12-04 NOTE — PLAN OF CARE
Problem: Patient Care Overview  Goal: Plan of Care Review  Outcome: Ongoing (interventions implemented as appropriate)  Pt disoriented to place and situation, no family members at bedside throughout shift. Pt denies pain, n/v/d, and SOB. ROBIN system in use to decrease chances of pt falls. Pt asleep majority of shift. TPN discontinued per order. Cardiac monitoring continued. Safety maintained, bed alarm on - will cont to monitor.

## 2017-12-04 NOTE — PLAN OF CARE
Problem: Physical Therapy Goal  Goal: Physical Therapy Goal  Goals to be met by: 2017  Patient will increase functional independence with mobility by performin. Supine <> sit with [place]  2. Sit <> stand transfer with Modified [place] with/without RW  3. Gait  x 150 feet with Modified [place] using Rolling Walker.       Outcome: Ongoing (interventions implemented as appropriate)  Tolerated treatment session well. PT continue to follow.

## 2017-12-04 NOTE — PROGRESS NOTES
[facility]  General Surgery  Progress Note    Subjective:     History of Present Illness:  No notes on file    Post-Op Info:  Procedure(s) (LRB):  LAPAROTOMY-EXPLORATORY (N/A)  RESECTION-SMALL BOWEL   7 Days Post-Op     Interval History:   Feeling much better, tawanda reg diet  Ambulating with PT  Having bowel movements  Afebrile    Medications:  Continuous Infusions:   Scheduled Meds:   albuterol-ipratropium 2.5mg-0.5mg/3mL  3 mL Nebulization Q6H    aspirin  81 mg Oral Daily    chlorhexidine  15 mL Mouth/Throat BID    donepezil  10 mg Oral BID    enalaprilat  1.25 mg Intravenous Q6H    enoxaparin  40 mg Subcutaneous Q24H    escitalopram oxalate  20 mg Oral Daily    memantine  10 mg Oral BID    methocarbamol  500 mg Oral QID     PRN Meds:acetaminophen, diphenhydrAMINE, hydrALAZINE, influenza, ondansetron, ramelteon, sodium chloride 0.9%     Review of patient's allergies indicates:  No Known Allergies  Objective:     Vital Signs (Most Recent):  Temp: 99.6 °F (37.6 °C) (12/04/17 1214)  Pulse: 80 (12/04/17 1411)  Resp: 18 (12/04/17 1411)  BP: (!) 157/67 (12/04/17 1214)  SpO2: 96 % (12/04/17 1411) Vital Signs (24h Range):  Temp:  [96.2 °F (35.7 °C)-99.6 °F (37.6 °C)] 99.6 °F (37.6 °C)  Pulse:  [78-99] 80  Resp:  [17-20] 18  SpO2:  [96 %-99 %] 96 %  BP: (121-159)/(59-72) 157/67     Weight: 74.8 kg (165 lb)  Body mass index is 29.23 kg/m².    Intake/Output - Last 3 Shifts       12/02 0700 - 12/03 0659 12/03 0700 - 12/04 0659 12/04 0700 - 12/05 0659    P.O. 500.5 1356           Total Intake(mL/kg) 1400.5 (18.6) 1356 (18.1)     Urine (mL/kg/hr) 621 (0.3) 825 (0.5)     Stool 0 (0)      Total Output 621 825      Net +779.5 +531             Urine Occurrence  1 x     Stool Occurrence 5 x 1 x 1 x          Physical Exam  Alert, comfortable in bed  Ab soft NT ND  Incision clean, no drainage. Intact        Significant Labs:  CBC:   Recent Labs  Lab 12/04/17  0530   WBC 15.90*   RBC 2.95*   HGB 8.8*    HCT 26.5*   *   MCV 90   MCH 29.8   MCHC 33.2       Significant Diagnostics:  I have reviewed all pertinent imaging results/findings within the past 24 hours.    Assessment/Plan:     * SBO (small bowel obstruction)    POD 7 s/p exlap, Charles with SBR   Pain controlled  Zayra dental soft , Boost added 12/2  Off TPN  PT/OT  PT recommended SNF but patient and family refuse, want to go home  Will order wheelchair for home use, home health consult with PT  Discharge today            Barney Gardner MD  General Surgery  [facility]

## 2017-12-04 NOTE — SUBJECTIVE & OBJECTIVE
Interval History:   Feeling much better, tawanda reg diet  Ambulating with PT  Having bowel movements  Afebrile    Medications:  Continuous Infusions:   Scheduled Meds:   albuterol-ipratropium 2.5mg-0.5mg/3mL  3 mL Nebulization Q6H    aspirin  81 mg Oral Daily    chlorhexidine  15 mL Mouth/Throat BID    donepezil  10 mg Oral BID    enalaprilat  1.25 mg Intravenous Q6H    enoxaparin  40 mg Subcutaneous Q24H    escitalopram oxalate  20 mg Oral Daily    memantine  10 mg Oral BID    methocarbamol  500 mg Oral QID     PRN Meds:acetaminophen, diphenhydrAMINE, hydrALAZINE, influenza, ondansetron, ramelteon, sodium chloride 0.9%     Review of patient's allergies indicates:  No Known Allergies  Objective:     Vital Signs (Most Recent):  Temp: 99.6 °F (37.6 °C) (12/04/17 1214)  Pulse: 80 (12/04/17 1411)  Resp: 18 (12/04/17 1411)  BP: (!) 157/67 (12/04/17 1214)  SpO2: 96 % (12/04/17 1411) Vital Signs (24h Range):  Temp:  [96.2 °F (35.7 °C)-99.6 °F (37.6 °C)] 99.6 °F (37.6 °C)  Pulse:  [78-99] 80  Resp:  [17-20] 18  SpO2:  [96 %-99 %] 96 %  BP: (121-159)/(59-72) 157/67     Weight: 74.8 kg (165 lb)  Body mass index is 29.23 kg/m².    Intake/Output - Last 3 Shifts       12/02 0700 - 12/03 0659 12/03 0700 - 12/04 0659 12/04 0700 - 12/05 0659    P.O. 500.5 1356           Total Intake(mL/kg) 1400.5 (18.6) 1356 (18.1)     Urine (mL/kg/hr) 621 (0.3) 825 (0.5)     Stool 0 (0)      Total Output 621 825      Net +779.5 +531             Urine Occurrence  1 x     Stool Occurrence 5 x 1 x 1 x          Physical Exam  Alert, comfortable in bed  Ab soft NT ND  Incision clean, no drainage. Intact        Significant Labs:  CBC:   Recent Labs  Lab 12/04/17  0530   WBC 15.90*   RBC 2.95*   HGB 8.8*   HCT 26.5*   *   MCV 90   MCH 29.8   MCHC 33.2       Significant Diagnostics:  I have reviewed all pertinent imaging results/findings within the past 24 hours.

## 2017-12-04 NOTE — PROGRESS NOTES
TN spoke with patient, spouse , and son at bedside.  Family has decided to bring patient home. Pt does not want to discharge to SNF.   Concerns discussed. Per family patient has RW at home. Called placed to Dr Gardner  With update, in surgery, message left.    Pt may need WC for long distances.      Future Appointments  Date Time Provider Department Center   12/6/2017 4:00 PM Pedro Pablo Drake MD [facility] PSYCH [facility]   12/11/2017 9:00 AM Barney Gardner MD [facility] G[facility]   2/7/2018 2:00 PM Miriam Saunders MD [facility]

## 2017-12-04 NOTE — ASSESSMENT & PLAN NOTE
POD 7 s/p exlap, Charles with SBR   Pain controlled  Zayra dental soft , Boost added 12/2  Off TPN  PT/OT  PT recommended SNF but patient and family refuse, want to go home  Will order wheelchair for home use, home health consult with PT  Discharge today

## 2017-12-04 NOTE — PROGRESS NOTES
Patient is being discharged home per MD.  Discharge instructions on medications, signs and symptoms when to call the MD, and follow-up visit are given to the patient.  Patient verbalized complete understanding on the above discharge instructions.  Instructed patient to  medication from   [facility] in [place].  Family members are at bedside and will accompany patient home.  To Lobby per wheelchair per Nursing staff.  Voiced no other concerns.  Safety maintained.

## 2017-12-04 NOTE — PT/OT/SLP PROGRESS
"Physical Therapy Treatment    Patient Name:  Lesa Holder   MRN:  [id number]    Recommendations:     Discharge Recommendations:  nursing facility, skilled   Discharge Equipment Recommendations:  (Defer to SNF)   Barriers to discharge: Decreased caregiver support    Assessment:     Lesa Holder is a 89 y.o. female admitted with a medical diagnosis of SBO (small bowel obstruction).  She presents with the following impairments/functional limitations:  weakness, gait instability, impaired endurance, impaired functional mobilty, impaired self care skills, impaired balance, decreased lower extremity function, pain, impaired cardiopulmonary response to activity. Mrs. Holder tolerated treatment session today, and ambulated a farther distance. When ambulating with RW Mrs. Holder demonstrates increased speed, step length and improved gait stability. She reported mild SOB during and slightly after ambulation, which improved with PLB. She would benefit from continued PT services to improve current impairments and return to a more independent functional mobility level.    Rehab Prognosis:  well; patient would benefit from acute skilled PT services to address these deficits and reach maximum level of function.      Recent Surgery: Procedure(s) (LRB):  LAPAROTOMY-EXPLORATORY (N/A)  RESECTION-SMALL BOWEL 7 Days Post-Op    Plan:     During this hospitalization, patient to be seen 6 x/week to address the above listed problems via gait training, therapeutic activities, therapeutic exercises  · Plan of Care Expires:  12/19/17   Plan of Care Reviewed with: patient, spouse, son    Subjective     Communicated with QUINTON Pelletier  prior to session.  Patient found up in chair with  and son in room upon PT entry to room, agreeable to treatment.      Chief Complaint: back pain  Patient comments/goals: improve mobility.   Pain/Comfort:  · Pain Rating 1:  ("a little" but did not rate pain)  · Location - Orientation 1: lower  · Location 1: " "back  · Pain Addressed 1: Reposition, Distraction  · Pain Rating Post-Intervention 1:  ("A little better than when you walked in" but did not rate pain.)    Patients cultural, spiritual, Evangelical conflicts given the current situation: none verbalized to PT     Objective:     Patient found with: telemetry     General Precautions: Standard, fall   Orthopedic Precautions:N/A   Braces: N/A     Functional Mobility:  · Transfers:     · Sit to Stand:  minimum assistance with rolling walker  · Toilet Transfer: CGA with  rolling walker  using  ambulated  · Gait: ~80 feet total      AM-PAC 6 CLICK MOBILITY  Turning over in bed (including adjusting bedclothes, sheets and blankets)?: 3  Sitting down on and standing up from a chair with arms (e.g., wheelchair, bedside commode, etc.): 3  Moving from lying on back to sitting on the side of the bed?: 3  Moving to and from a bed to a chair (including a wheelchair)?: 3  Need to walk in hospital room?: 3  Climbing 3-5 steps with a railing?: 1  Total Score: 16       Therapeutic Activities and Exercises:  -seated therex: LAQ 2x15 BLE  -Pt ambulated ~20 feet to restroom with HHAX1 and Min A. Demonstrating decreased yoanna, step length and speed.   -CGA for toileting and hand hygiene.   -Ambulated ~65 feet with RW and CGA. Occasional VC to stay inside walker.   - seated therex: hip flexion 2x10, heel toe raises X20, trunk trunk rotation holding 5' each X5 bilaterally, thoracic SB X5 bilaterally.     Patient left up in chair with all lines intact, call button in reach and son and  present..    GOALS:    Physical Therapy Goals        Problem: Physical Therapy Goal    Goal Priority Disciplines Outcome Goal Variances Interventions   Physical Therapy Goal     PT/OT, PT Ongoing (interventions implemented as appropriate)     Description:  Goals to be met by: 2017  Patient will increase functional independence with mobility by performin. Supine <> sit with [place]  2. " Sit <> stand transfer with Modified [place] with/without RW  3. Gait  x 150 feet with Modified [place] using Rolling Walker.                        Time Tracking:     PT Received On: 12/04/17  PT Start Time: 1249     PT Stop Time: 1321  PT Total Time (min): 32 min     Billable Minutes: Gait Training 12 and Therapeutic Exercise 15    Treatment Type: Treatment  PT/PTA: PT     PTA Visit Number: 0     Fredi Kramer, PT  12/04/2017

## 2017-12-04 NOTE — DISCHARGE SUMMARY
[facility]  Short Stay  Discharge Summary    Admit Date: 11/20/2017    Discharge Date and Time:  12/04/2017 3:33 PM      Discharge Attending Physician: Barney Gardner MD    Hospital Course (synopsis of major diagnoses, care, treatment, and services provided during the course of the hospital stay): 89F who presented with signs and symptoms of adhesive bowel obstruction. She was placed to NG suction, PO contrast was given. Conservative management was attempted for several days. The family was reluctant to undergo surgery but it became apparent that she would not improved. She finally underwent ex lap with Charles and SBR. She slowly recovered but her diet was advanced to a regular diet, she began having bowel movements and was ambulating well. She is in good spirits with minimal pain and desires to go home with her family. She will be discharged with PT home health and a wheelchair.    Final Diagnoses:    Principal Problem: SBO (small bowel obstruction)   Secondary Diagnoses: Dementia    Discharged Condition: stable    Disposition: Home-Health Care [facility]    Follow up/Patient Instructions:    Medications:  Reconciled Home Medications:   Current Discharge Medication List      START taking these medications    Details   !! oxyCODONE-acetaminophen (PERCOCET) 5-325 mg per tablet Take 1 tablet by mouth every 4 (four) hours as needed for Pain.  Qty: 20 tablet, Refills: 0       !! - Potential duplicate medications found. Please discuss with provider.      CONTINUE these medications which have NOT CHANGED    Details   aspirin 81 MG Chew Take 1 tablet (81 mg total) by mouth once daily.    Associated Diagnoses: Essential hypertension; Alzheimer's dementia without behavioral disturbance, unspecified timing of dementia onset      benzonatate (TESSALON) 100 MG capsule Take 200 mg by mouth 2 (two) times daily as needed for Cough.       donepezil (ARICEPT) 10 MG tablet Take 1 tablet (10 mg total) by mouth 2 (two) times  "daily.  Qty: 180 tablet, Refills: 1    Associated Diagnoses: Alzheimer's dementia without behavioral disturbance, unspecified timing of dementia onset      escitalopram oxalate (LEXAPRO) 20 MG tablet Take 1 tablet (20 mg total) by mouth once daily.  Qty: 90 tablet, Refills: 3    Associated Diagnoses: Depression, unspecified depression type      esomeprazole (NEXIUM) 40 MG capsule Take 1 capsule by mouth Daily.  Refills: 3      losartan (COZAAR) 50 MG tablet Take 1 tablet (50 mg total) by mouth once daily.  Qty: 90 tablet, Refills: 3    Associated Diagnoses: Essential hypertension      memantine (NAMENDA) 10 MG Tab Take 1 tablet (10 mg total) by mouth 2 (two) times daily.  Qty: 180 tablet, Refills: 1    Comments: **Patient requests 90 days supply**  Associated Diagnoses: Alzheimer's dementia without behavioral disturbance, unspecified timing of dementia onset      methocarbamol (ROBAXIN) 500 MG Tab Take 500 mg by mouth 4 (four) times daily.      multivitamin capsule Take 1 capsule by mouth once daily.      !! oxycodone-acetaminophen (PERCOCET) 5-325 mg per tablet Take 1 tablet by mouth every 4 (four) hours as needed for Pain.  Qty: 18 tablet, Refills: 0      sucralfate (CARAFATE) 100 mg/mL suspension Take 10 mLs (1 g total) by mouth every evening.  Qty: 414 mL, Refills: 3    Associated Diagnoses: Gastroesophageal reflux disease, esophagitis presence not specified; Cough, persistent       !! - Potential duplicate medications found. Please discuss with provider.          Discharge Procedure Orders  WHEELCHAIR FOR HOME USE   Order Specific Question Answer Comments   Hours in W/C per day: 3    Type of Wheelchair: Standard    Size(Width): 20"    Leg Support: STD footrests    Lap Belt: Velcro    Cushion: Basic    Height: 5' 3" (1.6 m)    Weight: 74.8 kg (165 lb)    Does patient have medical equipment at home? bedside commode    Does patient have medical equipment at home? walker, rolling    Does patient have medical " equipment at home? shower chair    Does patient have medical equipment at home? grab bar    Length of need (1-99 months): 2    Please check all that apply: Patient mobility limitations cannot be sufficiently resolved by the use of other ambulatory therapies.      Ambulatory referral to Outpatient Case Management   Referral Priority: Routine Referral Type: Consultation   Referral Reason: Specialty Services Required    Number of Visits Requested: 1      Ambulatory referral to Home Health   Referral Priority: Routine Referral Type: Home Health   Referral Reason: Specialty Services Required    Requested Specialty: Home Health Services    Number of Visits Requested: 1      Diet general       Follow-up Information     Barney Gardner MD In 1 week.    Specialties:  Surgery, General Surgery  Contact information:  [address]  [address]  [facility] [address] [phone]

## 2017-12-04 NOTE — PLAN OF CARE
Problem: Patient Care Overview  Goal: Plan of Care Review  Outcome: Ongoing (interventions implemented as appropriate)  Patient with continued confusion.  Needs constant reorientation to stay in be, to not try to ambulate on her own.  AVASYS placed in room once her spouse left for the day.  IV fluids infusing without difficulty to Left Arm PICC.

## 2017-12-04 NOTE — PLAN OF CARE
Problem: Occupational Therapy Goal  Goal: Occupational Therapy Goal  Goals to be met by: 12/10/2017    Patient will increase functional independence with ADLs by performing:    UE Dressing with Set-up Assistance.  LE Dressing with Minimal Assistance with adaptive devices  Grooming while seated at sink with Supervision.  Toileting from bedside commode with Supervision for hygiene and clothing management.   Supine to sit with Modified [place].  Stand pivot transfers with Supervision.  Toilet transfer to bedside commode with Supervision.  Increased functional strength to 4/5  for BUE  Upper extremity exercise program 10 reps per handout, with supervision.     Outcome: Ongoing (interventions implemented as appropriate)  Patient with improved alertness, bed mobility and functional transitions this date. Some decreased safety awareness and impulsivity noted. Patient will benefit from continued skilled OT to address functional deficits.

## 2017-12-05 ENCOUNTER — PATIENT OUTREACH (OUTPATIENT)
Dept: ADMINISTRATIVE | Facility: CLINIC | Age: 82
End: 2017-12-05

## 2017-12-05 ENCOUNTER — OUTPATIENT CASE MANAGEMENT (OUTPATIENT)
Dept: ADMINISTRATIVE | Facility: OTHER | Age: 82
End: 2017-12-05

## 2017-12-05 NOTE — PROGRESS NOTES
Thank you for the referral. The following patient has been assigned to Vonda Gonzalez RN with Outpatient Complex Care Management for high risk screening.    Reason for referral: Bowel obstruction    Please contact [facility] at [phone] with any questions.    Thank you,  Varsha Hodge

## 2017-12-05 NOTE — PLAN OF CARE
Patient to D/C to home with spouse. PT has been accepted by [facility]  for services.  WC order faxed to  [facility].       Future Appointments  Date Time Provider Department Center   12/6/2017 4:00 PM Pedro Pablo Drake MD [facility]   12/11/2017 9:00 AM [facility]   2/7/2018 2:00 PM Miriam Saunders MD [facility]        12/05/17 1111   Final Note   Assessment Type Final Discharge Note   Discharge Disposition Home-Health   What phone number can be called within the next 1-3 days to see how you are doing after discharge? [phone]   Hospital Follow Up  Appt(s) scheduled? Yes   Discharge plans and expectations educations in teach back method with documentation complete? Yes   Right Care Referral Info   Post Acute Recommendation Home-care   Referral Type home health   Facility Name [facility]

## 2017-12-05 NOTE — PATIENT INSTRUCTIONS
Small Bowel Obstruction     Small bowel obstruction can lead to tissue damage and even tissue death.   A small bowel obstruction occurs when part or all of the small intestine (bowel) is blocked. As a result, digestive contents cant move through the bowel properly and out of the body. Treatment is needed right away to remove the blockage. This can ease painful symptoms. It can also prevent serious problems, such as tissue death or bursting (rupture) of the small bowel. Without treatment, a small bowel obstruction can be fatal.  Causes of small bowel obstruction  A small bowel obstruction can be caused by:  · Scar tissue (adhesions). These may form after belly (abdominal) surgery or an infection.  · Hernia. A hernia is when an organ pushes through a weak spot or tear in the abdomen wall. Part of the small bowel can push out and be seen as a bulge under the belly. Hernias can also occur internally.  · Certain health problems. These include when part of the bowel slides inside another part (intussusception). Other causes include irritable bowel disease such as Crohns disease, and inflammation and sores in the intestine (ulcerative colitis).  · Abnormal tissue growths (tumors). These can form on the inside or outside of the small bowel. They are usually due to cancer.  Symptoms of small bowel obstruction  Common symptoms include:  · Belly cramping and pain  · Belly swelling and bloating  · Upset stomach (nausea) and vomiting  · Can't  pass gas  · Can't pass stool (constipation)  · Diarrhea  Diagnosing small bowel obstruction  Your provider will ask about your symptoms and health history. Youll also have a physical exam. Tests may also be done to confirm the problem. These can include:  · Imaging tests. These provide pictures of the small bowel. Common tests include X-rays and a CT scan.  · Blood tests. These check for infection and other problems, such as excess fluid loss (dehydration).  · Upper GI  (gastrointestinal) series with a small bowel follow-through. This test takes X-rays of the upper digestive tract from the mouth through the small bowel. An X-ray dye (contrast fluid) is used. The dye coats the inside of your upper digestive tract so it will show up clearly on X-rays.  Treating small bowel obstruction  Treatment takes place in a hospital. As part of your care, the following may be done:  · No food or drink is given by mouth. This allows your bowels to rest.  · An IV (intravenous) line is placed in a vein in your arm or hand. The IV line is used to give fluids. It may also be used to give medicines. These may be needed to ease pain, nausea, and other symptoms. They may also be needed to treat or prevent infections.  · A soft, thin, flexible tube (nasogastric tube) is inserted through your nose and into your stomach. The tube is used to remove extra gas and fluid in your stomach and bowels. This helps to ease symptoms such as pain and swelling.  · In severe cases, surgery is done. This may be needed if the small bowel is almost or totally blocked, or there is a hole in the bowel (bowel perforation). During surgery, the blockage is removed. Parts of the bowel may also be removed if there is tissue death. Other repair may be done as well, depending on what caused the blockage. Your healthcare provider will give you more information about surgery, if needed.  · Youll be watched closely in the hospital until your symptoms improve. Your provider will tell you when you can go home.  Long-term concerns   After treatment, most people recover with no lasting effects. If a long part of the bowel is removed, there is a greater chance for lifelong digestive problems. Bowel movements may become irregular. Work with your provider to learn the best ways to manage any symptoms you may have, and to protect your health.  When to call your healthcare provider  Call your provider right away if you have any of the  following:  · Severe pain (Call 911)  · Belly swelling or cramping that wont go away  · Cant pass stool or gas  · Nausea or vomiting (especially if the vomit looks or smells like stool)   Date Last Reviewed: 7/1/2016  © 6520-6493 [employer]. [address] [place] [address], [place] [address]. All rights reserved. This information is not intended as a substitute for professional medical care. Always follow your healthcare professional's instructions.

## 2017-12-05 NOTE — PROGRESS NOTES
TN spoke with patient, spouse, and son at bedside. Family at decided to go home with HH. Concerns addresses with family. Pt will have assistance from family at home.  TN to arrange WC for pt. Message left to Dr Gardner.      Future Appointments  Date Time Provider Department Center   12/6/2017 4:00 PM Pedro Pablo Drake MD [facility]   12/11/2017 9:00 AM Barney Gardner MD [facility]   2/7/2018 2:00 PM Miriam Saunders MD [facility]

## 2017-12-07 NOTE — PHYSICIAN QUERY
PT Name: Lesa Holder  MR #: [id number]     Physician Query Form - Documentation Clarification      CDS/: Coby Madden RN  CCDS               Contact information: [email or web address]    This form is a permanent document in the medical record.     Query Date: December 7, 2017    By submitting this query, we are merely seeking further clarification of documentation. Please utilize your independent clinical judgment when addressing the question(s) below.    The Medical record reflects the following:    Supporting Clinical Findings Location in Medical Record    Intubated, not currently on sedation but likely remains sedated post-procedurally as she is not responsive.  She remains intubated post-procedure for unclear reasons.   89yoF presenting with SBO now with respiratory failure s/p ex lap requiring intubation. Anesthesia wanted to keep her intubated overnight.    Acute Respiratory Failure on MV     89 yo with hx of dementia admitted for a SBO s/p ex lap with post-operative respiratory failure. Extubated successfully to NC without issues. Breathing comfortably on exam.    1. Post-operative respiratory failure: continue to wean supplemental O2 for goal saturations >88%. Encourage mobilization. Aggressive bronchopulmonary hygiene.   2. Volume overload: consider diuresis for goal net negative volume status.       89yoF presenting with SBO now with respiratory failure s/p ex lap requiring intubation. Extubated this AM.   Acute Respiratory Failure (Resolved)      Pulmonary/Chest: No respiratory distress  CC Med consult 11/27                     CC Med consult attestation 11/27                       CC Med progress note 11/28           Surgery progress note 11/28    She finally underwent ex lap with Charles and SBR. She slowly recovered but her diet was advanced to a regular diet, she began having bowel movements and was ambulating well.    Final Diagnoses:   Principal Problem: SBO (small bowel obstruction)    Secondary Diagnoses: Dementia   Discharge Summary                                                                         Doctor, please specify diagnosis or diagnoses associated with above clinical findings.    Please further clarify the respiratory condition diagnosis:    Provider Use Only      [ x ]  Acute respiratory failure occurring in the post-op period      [  ]  Acute respiratory distress occurring in the post-op period      [  ]  No respiratory failure, kept intubated on vent overnight for airway protection only      [  ]  Other respiratory diagnosis (specify): ________________      [  ]  Clinically undetermined

## 2017-12-07 NOTE — PT/OT/SLP DISCHARGE
Occupational Therapy Discharge Summary    Lesa Holder  MRN: [id number]   Principal Problem: SBO (small bowel obstruction)      Patient Discharged from acute Occupational Therapy on 12/4/17.  Please refer to prior OT note dated 12/4/17 for functional status.    Assessment:      Patient appropriate for care in another setting.    Objective:     GOALS:    Occupational Therapy Goals     Not on file          Multidisciplinary Problems (Resolved)        Problem: Occupational Therapy Goal    Goal Priority Disciplines Outcome Interventions   Occupational Therapy Goal   (Resolved)     OT, PT/OT Outcome(s) achieved    Description:  Goals to be met by: 12/10/2017    Patient will increase functional independence with ADLs by performing:    UE Dressing with Set-up Assistance.  LE Dressing with Minimal Assistance with adaptive devices  Grooming while seated at sink with Supervision.  Toileting from bedside commode with Supervision for hygiene and clothing management.   Supine to sit with Modified [place].  Stand pivot transfers with Supervision.  Toilet transfer to bedside commode with Supervision.  Increased functional strength to 4/5  for BUE  Upper extremity exercise program 10 reps per handout, with supervision.                      Reasons for Discontinuation of Therapy Services  Transfer to alternate level of care.      Plan:     Patient Discharged to: Home with Home Health Service    Malinda Plummer, OT  12/7/2017

## 2017-12-11 ENCOUNTER — OUTPATIENT CASE MANAGEMENT (OUTPATIENT)
Dept: ADMINISTRATIVE | Facility: OTHER | Age: 82
End: 2017-12-11

## 2017-12-11 ENCOUNTER — OFFICE VISIT (OUTPATIENT)
Dept: SURGERY | Facility: CLINIC | Age: 82
End: 2017-12-11
Payer: MEDICARE

## 2017-12-11 VITALS
DIASTOLIC BLOOD PRESSURE: 69 MMHG | TEMPERATURE: 98 F | BODY MASS INDEX: 28.32 KG/M2 | HEIGHT: 63 IN | WEIGHT: 159.81 LBS | SYSTOLIC BLOOD PRESSURE: 124 MMHG | HEART RATE: 78 BPM

## 2017-12-11 DIAGNOSIS — K56.609 SBO (SMALL BOWEL OBSTRUCTION): Primary | ICD-10-CM

## 2017-12-11 PROCEDURE — 99213 OFFICE O/P EST LOW 20 MIN: CPT | Mod: PBBFAC,PO | Performed by: STUDENT IN AN ORGANIZED HEALTH CARE EDUCATION/TRAINING PROGRAM

## 2017-12-11 PROCEDURE — 99999 PR PBB SHADOW E&M-EST. PATIENT-LVL III: CPT | Mod: PBBFAC,,, | Performed by: STUDENT IN AN ORGANIZED HEALTH CARE EDUCATION/TRAINING PROGRAM

## 2017-12-11 PROCEDURE — 99024 POSTOP FOLLOW-UP VISIT: CPT | Mod: ,,, | Performed by: STUDENT IN AN ORGANIZED HEALTH CARE EDUCATION/TRAINING PROGRAM

## 2017-12-11 RX ORDER — MEMANTINE HYDROCHLORIDE 10 MG/1
TABLET ORAL
Qty: 180 TABLET | Refills: 0 | Status: SHIPPED | OUTPATIENT
Start: 2017-12-11 | End: 2018-02-07 | Stop reason: SDUPTHER

## 2017-12-11 NOTE — PROGRESS NOTES
Feeling well, no problems at home  Zayra diet  Ambulating with assistance  Having bowel movements ok  No NV  Minimal pain    Ab soft NT ND  Incision with staples intact, no erythema    Staples out today  RTC prn

## 2017-12-11 NOTE — PROGRESS NOTES
Attempt to complete initial assessment for Outpatient Care Management; left message requesting return call. Johnny RN-OPCM

## 2017-12-14 ENCOUNTER — NURSE TRIAGE (OUTPATIENT)
Dept: ADMINISTRATIVE | Facility: CLINIC | Age: 82
End: 2017-12-14

## 2017-12-15 ENCOUNTER — TELEPHONE (OUTPATIENT)
Dept: SURGERY | Facility: CLINIC | Age: 82
End: 2017-12-15

## 2017-12-15 ENCOUNTER — OFFICE VISIT (OUTPATIENT)
Dept: SURGERY | Facility: CLINIC | Age: 82
End: 2017-12-15
Payer: MEDICARE

## 2017-12-15 ENCOUNTER — HOSPITAL ENCOUNTER (OUTPATIENT)
Dept: RADIOLOGY | Facility: HOSPITAL | Age: 82
Discharge: HOME | End: 2017-12-15
Attending: STUDENT IN AN ORGANIZED HEALTH CARE EDUCATION/TRAINING PROGRAM
Payer: MEDICARE

## 2017-12-15 VITALS
TEMPERATURE: 98 F | DIASTOLIC BLOOD PRESSURE: 79 MMHG | SYSTOLIC BLOOD PRESSURE: 135 MMHG | HEART RATE: 86 BPM | HEIGHT: 63 IN

## 2017-12-15 DIAGNOSIS — R50.9 FEVER, UNSPECIFIED FEVER CAUSE: Primary | ICD-10-CM

## 2017-12-15 DIAGNOSIS — R50.9 FEVER, UNSPECIFIED FEVER CAUSE: ICD-10-CM

## 2017-12-15 PROCEDURE — 71020 XR CHEST PA AND LATERAL: CPT | Mod: TC

## 2017-12-15 PROCEDURE — 71020 XR CHEST PA AND LATERAL: CPT | Mod: 26,,, | Performed by: RADIOLOGY

## 2017-12-15 PROCEDURE — 99999 PR PBB SHADOW E&M-EST. PATIENT-LVL III: CPT | Mod: PBBFAC,,, | Performed by: STUDENT IN AN ORGANIZED HEALTH CARE EDUCATION/TRAINING PROGRAM

## 2017-12-15 PROCEDURE — 99024 POSTOP FOLLOW-UP VISIT: CPT | Mod: ,,, | Performed by: STUDENT IN AN ORGANIZED HEALTH CARE EDUCATION/TRAINING PROGRAM

## 2017-12-15 PROCEDURE — 99213 OFFICE O/P EST LOW 20 MIN: CPT | Mod: PBBFAC,25,PO | Performed by: STUDENT IN AN ORGANIZED HEALTH CARE EDUCATION/TRAINING PROGRAM

## 2017-12-15 NOTE — PROGRESS NOTES
Presents with fever this morning, felt hot.  Possibly 102, not sure  Complains of mild cough  No dysuria  No abdominal pain, no drainage from incision  Having BM every other day or so  Denies NV but not much appetite  She is ambulating some    Ab soft NT ND  Incision healing, no drainage, no erythema. Staples removed a few days ago.    Will start with CXR, CBC, UA, rapid flu. May just be viral. She has not had flu vaccine this year. Given lack of symptoms abdominal source further down but will consider CT scan if fever persists and has normal workup

## 2017-12-15 NOTE — TELEPHONE ENCOUNTER
----- Message from Doreen Rubio sent at 12/15/2017  9:17 AM CST -----  Contact: Jayla (Nurse) w/ [facility] [phone]  Nurse is calling to have patient seen today for high fever. Please call and advise.    12/15/17  09:27am    Returned call to Jayla with [facility]. Stated she would like to have patient seen by Dr. JOAQUIM Gardner today if possible. Informed that patient may come in @ 10:40am. Verbalized understanding.

## 2017-12-15 NOTE — TELEPHONE ENCOUNTER
"Reached Dr Joseph, on call for Dr Gardner (per [facility] ), provided the home health nurse's report as noted in triage call, inc VS.  He said he recommendeds she does not have to go to the ED tonight, but she should be given Tylenol for fever, monitor VS, call back if her temp climbs or she develops additional symptoms, and call Dr Gardner's clinic tomorrow, to be seen there tomorrow.  Called Jayla  nurse to let her know of Dr Joseph's recommendations.  She will call Lesa's family, as well as Dr Moser office tomorrow.  Message to Dr Gardner.  Please contact caller directly with any additional care advice.      Reason for Disposition   Fever > 100.5 F (38.1 C)     Temp 100.7 per Home Health nurse, Jayla.  Dr Gardner called to notify. Has fatigue, malaise, chronic cough (not new per nurse), /70, pulse 90, pulse ox 98%, RR 20, good urine, no HA.    Answer Assessment - Initial Assessment Questions  1. TEMPERATURE: "What is the most recent temperature?"  "How was it measured?"       100.7 oral.    2. ONSET: "When did the fever start?"       Tonight, 1 hour ago.    3. SYMPTOMS: "Do you have any other symptoms besides the fever?"  (e.g., colds, headache, sore throat, earache, cough, rash, diarrhea, vomiting, abdominal pain)      Malaise, fatigue, slight cough.    4. CAUSE: If there are no symptoms, ask: "What do you think is causing the fever?"         5. CONTACTS: "Does anyone else in the family have an infection?"        6. TREATMENT: "What have you done so far to treat this fever?" (e.g., medications)      Home Health nurse told caregiver to give her tylenol, encourage hydration.    7. IMMUNOCOMPROMISE: "Do you have of the following: diabetes, HIV positive, splenectomy, cancer chemotherapy, chronic steroid treatment, transplant patient, etc."      Post op visit 12/11 with Dr Gardner.  Had exploratory lap for SBO 11/27 by Dr Barney Gardner. Age 89 years.    8. PREGNANCY: "Is " "there any chance you are pregnant?" "When was your last menstrual period?"      n/a  9. TRAVEL: "Have you traveled out of the country in the last month?" (e.g., travel history, exposures)      n/a    Protocols used: ST POST-OP SYMPTOMS AND BULGSMERT-R-NR, ST FEVER-A-AH      "

## 2017-12-27 ENCOUNTER — OUTPATIENT CASE MANAGEMENT (OUTPATIENT)
Dept: ADMINISTRATIVE | Facility: OTHER | Age: 82
End: 2017-12-27

## 2017-12-27 NOTE — LETTER
December 27, 2017    Lesa [employer] Glory  [address] [place] [address]             [facility] [address] [place] [address] Dear Mrs. Holder:    I have chose some educational and resource material that I believe you may find useful. Please take your time to review them. Do not hesitate to call me for any questions or concerns. Remember, you may also call [facility] Nurse Triage line at [phone] for assistance anytime.     My contact list:    Your PCP is: Dr.   General Appointment Scheduling [phone]  [facility] (nurse line) [phone]  [facility] [phone]    [place] [facility] [phone]    Sincerely,     Vonda Gonzalez, RN  Outpatient Case Management  [facility]  [phone]

## 2017-12-28 ENCOUNTER — OUTPATIENT CASE MANAGEMENT (OUTPATIENT)
Dept: ADMINISTRATIVE | Facility: OTHER | Age: 82
End: 2017-12-28

## 2017-12-28 NOTE — PROGRESS NOTES
Please note an Outpatient Complex Care Management welcome packet and consent form was created and mailed to the patient on 12/28/17.    Please contact [facility] at [phone] with any questions.    Thank you,    Latoya Dunn, SSC

## 2017-12-28 NOTE — LETTER
December 28, 2017    Lesa Holder  [address] [place] [address] [place] [address]             Outpatient Case Management  Jose Maria Glass  [place] [address] Dear Lesa Holder:    We understand that receiving many services from different doctors and healthcare providers is overwhelming. There are appointments to make, transportation to arrange, dietary instructions to understand, and new medications to obtain.    This is where [facility] can help.     You are eligible to receive Outpatient Case Management services when you have healthcare needs that require the coordination of many providers, treatments, and services. You also qualify if you need assistance with a new treatment plan.     There is no charge for this support. You may have been referred to this program from your doctor(s), hospital staff member(s), or insurance company but you always have a choice to participate or not participate. To participate, you must give us your permission to be enrolled.     When you are enrolled in the [facility] program, the  who is assigned to you is    Vonda Gonzalez, RN  [phone]    Depending on your needs and wishes, your  may speak with you by phone, visit you at your place of living (for example your home, skilled nursing facility, or rehabilitation facility), or meet you at your doctors office.     Your  will tell you why you have been selected to participate in the program and will complete an assessment of your needs. Then a personalized plan of care will be developed with you and or your caregiver.             Here are examples of the services your [facility]  provides.     Coordinate communication among multiple providers.   Arrange for transportation, doctors visits, durable medical equipment, home care services, and special clinics.    Provide coaching on how to manage your health condition.    Answer  questions about your health condition.   Help you understand your doctors treatment plan.    Provide additional instruction about your health condition, treatments, and medications.    Help you obtain information about your insurance coverage.    Advocate for your individual needs.    Request a Licensed Clinical  (LCSW) to visit you if you need their services. LCSWs help with long term planning (discussing placement options, advanced planning directives), financial planning, and assistance (for example rent, utilities, medication funding).     Your  will coordinate their activities with other outpatient services you are receiving. All services provided by [facility]  are coordinated with and communicated to your primary care physician.    Our goal is to help you manage your health condition(s) safely within your living environment, whether that is your home or a medical facility. We want to help you function at the healthiest and highest level possible.     Sincerely,      Donis Chiang MD  Medical Director    Enclosures:    Frequently Asked Questions  Patient Rights and Responsibilities   Reporting a Grievance or Complaint  Consent/Release of Information  Stamped Addressed Envelope                  Frequently Asked Questions about [facility]    What is [facility]?  Outpatient Case management is not Home healthcare services. [facility]  do not provide hands-on care. [facility]  will work with your doctor to arrange for home health services, if needed. Home health services have a limited duration and there are some restrictions as to who can get these services. There is no prescribed limit to the amount of time you receive [facility] services. [facility]  are not agents of your insurance company. However, [facility] Case  Managers can help you obtain information from your insurance company.     Who are the [facility] ?  [facility]  are Registered Nurses and Social Workers. It is important to remember that you and your  are a team that works together with your primary care physician to create your individualized plan of care. The ultimate goal of your care plan is to help you implement your doctors treatment plan and to help you function at the highest level of health possible.     What are my rights as a patient?  It is important for you to know and understand your rights and responsibilities while receiving services from the [facility] program. We have enclosed a complete description of your rights and responsibilities. You can help to make your care more effective when you understand your right and responsibilities.     What is needed to be enrolled in the program?  You are only enrolled in the [facility] Program when you give us your consent to participate. You will find enclosed a consent form. You are receiving this letter because you or your caregivers have given us a verbal consent to enroll you in [facility] Program. We ask that you sign and return the enclosed written consent in the stamped self-addressed envelope.                           Patient Rights and Responsibilities    We consider you a partner in your care. When you are well informed, participate in treatment decisions and communicate openly with your doctor and other healthcare professionals, you help make your care more effective.     While you are in the Outpatient Case Management Program, your rights include the following:     You have a right to be provided services in a non-discriminatory manner in accordance with the provisions of Title VI of the Civil Rights Act of 1964, Section 504 of the Rehabilitation Act of 1973, the Age  Discrimination Act of 1975, the Americans with Disabilities Act as well as any other applicable Federal and State laws and regulations.     You have the right to a reasonable, timely response to your request or need for care, as well as the right to considerate and respectful care including an environment that preserves dignity and contributes to a positive self-image. You are responsible for being considerate and respectful of our staff.     You have a right to information regarding patient rights, advocacy services and complaint mechanisms, and the right to prompt resolution of any complaint. You or a designee has the right to participate in the resolution of ethical issues surrounding your care. You have a right to file a complaint if you feel that your rights have been infringed, without fear or penalty from [facility] or the federal government. You may file a complaint with the Director of Outpatient Case Management by calling [phone]. At any time, you may lodge a grievance with the [facility] and [facility] by calling [phone], or the Joint Commission on Accreditation of Healthcare Organizations at [phone].     You, or someone acting on your behalf, have the right to understandable information on your health status, treatment and progress in order to make decisions. You have the right to know the nature, risks and alternatives to treatment. You have the right to be informed, when appropriate, regarding the outcome of the care that has been provided. You have the right to refuse treatment to the extent permitted by law, and the right to be informed of the alternatives and consequences of refusing treatment.     You, in collaboration with your physician, have the right to make decisions regarding care and the right to participate in the development and implementation of the plan of care and effective pain management. You have the right to know the name and professional status of  those responsible for the delivery of your care and treatment.       You have a right, within legal guidelines, to have a guardian, next-of-kin or legal designee exercises your patient rights when you are unable to do so. You have the right for your wishes regarding end-of-life decisions to be addressed by the healthcare team through advance directives. You have the right to personal privacy and confidentiality and to expect confidentiality of all records and communications pertaining to your care.      You have the right to receive communications about your health information confidentially. You have the right to request restrictions on the uses and disclosures of your health information. You have the right to inspect, copy, request amendments and receive an accounting of to whom we have disclosed your health information.     You have the right to be provided with interpretation services if you do not speak English; to alternative communication techniques if you are hearing or vision impaired; and to have any other resources needed on your behalf to ensure effective communication. These services are provided free of charge.     You have a right to personal safety (free from mental, physical, sexual and verbal abuse, neglect and exploitation). You have the right to access protective and advocacy services.     Advance Directives  A Patient Advocate is available to meet with patients to answer questions regarding advance directives.    Living Will  A document that outlines what medical treatment the patient does or does not want in the event the patient becomes unable to make those decisions at the appropriate time.    Durable Medical Power of   A document by which the patient designates an individual to be responsible for making medical decisions in the event the patient becomes unable to do so.    HIPAA Notice of Privacy Practices  Your medical information is governed by federal privacy laws. HIPAA  protects private medical information and how that information is disclosed. If you have a question regarding the HIPAA Notice of Privacy Practices, or if you believe your privacy rights have been violated, you may call our designated hotline at [phone].            Quality Improvement  Because we consistently strive to improve the care and service provided to our patients, we welcome your feedback. Your comments are an important part of our quality improvement process, as we like to know what we are doing right and which areas are in need of improvement. Our policy is to listen, be responsive and provide you with an appropriate and timely follow-up to your questions or concerns. Our goal is active patient and family involvement in all aspects of the care process.         Reporting a Grievance or Complaint    During your time with the [facility] team you may have a grievance or complaint with our services. Your Patients Bill of Rights gives patients, families, and caregivers the right to express concerns and grievances and the right to expect a reasonable and timely response.     Your presentation of your concerns is not viewed negatively. It is an opportunity for us to improve the quality of our care and services we provide to you.     You may report your concerns directly to your , or you can phone in a complaint to:     Director of Outpatient Case Management  [phone]    You may also send a complaint letter to:    Director of Outpatient Case Management Services  [address] [place] [id number]    Tell us the details of your complaint and provide us with a contact phone number so we can contact you to obtain additional information. We will return a call to you within two business days of our receipt of your complaint, and to request additional information as needed. If you choose to mail a letter, your complaint may take a few days longer to reach us.      All grievances will be addressed as quickly as possible. A grievance or complaint that involves situations or practices which place patients in immediate danger will be addressed as an urgent matter. We will work to resolve all other complaints within seven days of receipt. By that time, you will receive a phone call with either the resolution of your complaint, or a plan for corrective action. A formal written response will be sent to you within 30 days of receipt of your grievance.     If a resolution cannot be completed within 30 days, a letter will be sent to you or your family member with an estimated time for the final response.    Additionally, all patients have the right to file complaints with external agencies, without exception. Complaints/grievances can be addressed to the following agencies:            Patient Safety or Quality of Care Concerns  Office of Quality Monitoring   The [facility] [place] [address]  [phone] Toll Free    HIPPA Privacy/Security Concerns  Office for Civil Rights Region IV  [place] Department of Health & Human Services  [address], [address]  [place] [address] [phone] Phone  [phone] TDD  [phone] Toll Free    Medicare/Medicaid Billing Concerns  [place] for Medicare & Medicaid Services  Region 6  [address], [address]  [place] [address] [phone] Phone  [phone] Toll Free    General Concerns  [place] [facility] ([facility])  [phone] Toll Free Complaint Hotline                                        Consent Form/Release of Information    By signing--     (1) I agree I have read the Outpatient Case Management information provided to me;     (2) I agree to voluntarily participate in the Outpatient Case Management program;     (3) I understand I must consent to participation in the Outpatient Case Management program during my first interview with my Case  Manager;    (4) I consent to the discussion and release of my personal health information to my healthcare team (including my personal physician, my medical home care team, any specialty physician(s), and my [facility] team);     (5) I agree my consent is valid for the length of time I am receiving Outpatient Case Management;    (6) I agree to referrals to community resources which my Case Management team recommends for me. I agree to the release of my personal information and personal health information as necessary to referral sources.    ___________________________________________________________________  Patients Printed Name     ___________________________________________________________________  Patients Signature       Date    If patient is in being cared for, please complete this section:     ___________________________________________________________________  Printed Name of Person Caring For Patient   Relationship To Patient    ___________________________________________________________________   Signature of Person Caring For Patient     Date    PLEASE SIGN AND RETURN IN THE ENCLOSED PRE-ADDRESSED ENVELOPE.

## 2018-01-10 ENCOUNTER — OUTPATIENT CASE MANAGEMENT (OUTPATIENT)
Dept: ADMINISTRATIVE | Facility: OTHER | Age: 83
End: 2018-01-10

## 2018-01-10 NOTE — PROGRESS NOTES
Pt is a 89 yr old female with PMH that includes Hypertension and Dementia. Pt reports she lives with her  in their own home. Pt reports her family takes her to her appointments as well and helps to  her medications. Pt reports she ambulates with a walker or cane depending on where she is going. Pt denies any falls in the last 12 months. Pt reports she would like more information on Hypertension.I will mail educational literature about hypertension/hypotension to patient/family, will review literature with patient/family at next phone call. Pt reports she currently has HH-RN coming to the house. Medication reconciliation performed, no discrepancies noted. Pt scored a 1 on PHQ-2. I will mail educational literature about Dementia to patient/family, will review literature with patient/family at next phone call. Will follow up with pt. OLIVER Gonzalez RN-[facility]    Follow up plan:    Continue to educate about Hypertension. Review signs and symptoms of hypertension/hypotension. Encourage patient to follow medication and treatment regimen. Encourage patient to maintain follow up with doctors.  Continue to educate about Dementia. Review signs and symptoms of Dementia. Encourage patient to follow medication and treatment regimen. Encourage patient to maintain follow up with doctors.

## 2018-01-11 NOTE — PROGRESS NOTES
Spoke to pt who states she received educational literature this nurse mailed to her. Pt reports her and her  read over the literature and do not currently have questions. Pt reports her  takes her BP daily and logs it. Pt reports yesterday her BP was 145/77. Discussed importance of pt monitoring her BP daily and logging it. Discussed with pt notifying PCP if BP is >140/90. Discussed signs and symptoms of hypertension. Pt currently denies any signs of hypertension. Pt denies any falls since we last spoke. Discussed fall prevention and home safety with pt. Pt reports she feels her memory is the same as it was the last time we spoke. Pt reports her  is home with her all day. Will follow up. OLIVER Nobles RN-[facility]      Follow up plan:    Continue to educate about Hypertension. Review signs and symptoms of hypertension/hypotension. Encourage patient to follow medication and treatment regimen. Encourage patient to maintain follow up with doctors.  Continue to educate about Dementia. Review signs and symptoms of Dementia. Encourage patient to follow medication and treatment regimen. Encourage patient to maintain follow up with doctors.

## 2018-01-18 ENCOUNTER — OUTPATIENT CASE MANAGEMENT (OUTPATIENT)
Dept: ADMINISTRATIVE | Facility: OTHER | Age: 83
End: 2018-01-18

## 2018-01-18 NOTE — PROGRESS NOTES
Good morning. My name is Vonda, I work for [facility]s Out[facility] case management department with . I wanted to call and review your disaster plan with you. I also wanted to go over some crucial information and provide you with emergency phone numbers for your [place].   [] Called patient, no answer, I left a message with the phone number for the [place] Office of Emergency Preparedness.   [x] Please be sure to have a supply of water, non-perishable food items, flashlights and batteries with you in your house.   [] Please be sure you bring all of your medications with you. Bring at least a five day supply. It is best to bring your medication bottles, in case you are displaced for a longer period of time, therefore you can get your medication refilled from your temporary location.   [] Please bring sure to bring any DME that you may need. This includes walkers, wheelchairs, shower chairs, nebulizer machine, etc.   [] If you are a diabetic- be sure to bring your glucometer and all glucometer monitoring supplies.   [] If you have high blood pressure- be sure to bring your blood pressure cuff so you can continue to monitor.   [] If you have CHF-be sure to bring your scale so you can continue to monitor.  [] If on PEG feeding- be sure to bring tube feedings and feeding supplies.  [] If on oxygen- be sure to bring all of your oxygen supplies: Cannula, portable tanks, concentrator, etc. Also contact you Oxygen Supply Company to find out the nearest location of an oxygen supply company to where you will be located. ([employer]: [phone], [facility]: [phone], [facility]:[phone], [employer]: [phone]).   [] If you receive hemodialysis- you should already have a plan in place with your dialysis center. If you do not know where you need to evacuate to in order to be close to a dialysis center- reach out to your dialysis center for further direction. ([facility] service line:  [phone], [facility] service line [phone])  [] If receiving treatment at an infusion center- please contact the infusion center to find out which infusion center they have a contract with. Also ask your infusion center for location of the infusion center they are in contract with, so if need be you can evacuate to that area.   Office of Emergency Preparedness Phone number:  [] [facility]: [phone]  [] [facility]: [phone]  [] [facility]: [phone]  [x] [facility]: [phone]  [] [facility]: [phone]  [] [facility]: [phone]  [] [facility]: [phone]  [] [facility]: [phone]  [] [facility]: [phone]  [] [facility]: [phone]  [] [facility]: [phone]  [] [facility]: [phone]  [] [facility]: [phone]    [] [facility]:  [phone]   [] [facility]:  [phone]   [] [facility]:  [phone]   [] [facility]:  [phone]   [] [facility]:  [phone]   [] [facility]: [phone]   [] [facility]:  [phone]   [] [facility]:  [phone]   [] [facility]:  [phone]   [] [facility]:  [phone]   [] [facility]:  [phone]   [] [facility]:  [phone]   [] [facility]:  [phone]   [] [facility]:  [phone]   [] [facility]:  [phone]   [] [facility]: [phone]   [] [facility]:  [phone]   [] [facility]: [phone]   [] [facility]: [phone]

## 2018-02-07 ENCOUNTER — OFFICE VISIT (OUTPATIENT)
Dept: INTERNAL MEDICINE | Facility: CLINIC | Age: 83
End: 2018-02-07
Payer: MEDICARE

## 2018-02-07 VITALS
OXYGEN SATURATION: 97 % | WEIGHT: 142.63 LBS | BODY MASS INDEX: 25.27 KG/M2 | HEIGHT: 63 IN | HEART RATE: 91 BPM | DIASTOLIC BLOOD PRESSURE: 68 MMHG | SYSTOLIC BLOOD PRESSURE: 126 MMHG

## 2018-02-07 DIAGNOSIS — I10 ESSENTIAL HYPERTENSION: Primary | Chronic | ICD-10-CM

## 2018-02-07 DIAGNOSIS — F02.80 ALZHEIMER'S DEMENTIA WITHOUT BEHAVIORAL DISTURBANCE, UNSPECIFIED TIMING OF DEMENTIA ONSET: Chronic | ICD-10-CM

## 2018-02-07 DIAGNOSIS — G30.9 ALZHEIMER'S DEMENTIA WITHOUT BEHAVIORAL DISTURBANCE, UNSPECIFIED TIMING OF DEMENTIA ONSET: Chronic | ICD-10-CM

## 2018-02-07 PROCEDURE — 1125F AMNT PAIN NOTED PAIN PRSNT: CPT | Mod: ,,, | Performed by: INTERNAL MEDICINE

## 2018-02-07 PROCEDURE — 1159F MED LIST DOCD IN RCRD: CPT | Mod: ,,, | Performed by: INTERNAL MEDICINE

## 2018-02-07 PROCEDURE — 99999 PR PBB SHADOW E&M-EST. PATIENT-LVL IV: CPT | Mod: PBBFAC,,, | Performed by: INTERNAL MEDICINE

## 2018-02-07 PROCEDURE — 99214 OFFICE O/P EST MOD 30 MIN: CPT | Mod: PBBFAC,PN | Performed by: INTERNAL MEDICINE

## 2018-02-07 PROCEDURE — 99213 OFFICE O/P EST LOW 20 MIN: CPT | Mod: S$PBB,,, | Performed by: INTERNAL MEDICINE

## 2018-02-07 NOTE — PROGRESS NOTES
"Subjective:      Patient ID: Lesa Holder is a 89 y.o. female.    Chief Complaint: Follow-up (3 month); Fatigue; and Nasal Congestion    HPI: 89 y.o. Black or  female, comes in with her , who is primary care giver  Since she has dementia.  She has had no recurrence of her small bowel obstruction.        Review of Systems   Constitutional: Negative.    HENT: Negative.    Eyes: Negative.    Respiratory: Negative.    Cardiovascular: Negative.    Gastrointestinal: Negative.  Negative for abdominal distention, abdominal pain, constipation, diarrhea, nausea and vomiting.   Endocrine: Negative.    Genitourinary: Negative.    Musculoskeletal: Positive for arthralgias.   Neurological: Negative.    Hematological: Negative.    Psychiatric/Behavioral: Positive for decreased concentration.       Objective:   /68   Pulse 91   Ht 5' 3" (1.6 m)   Wt 64.7 kg (142 lb 10.2 oz)   SpO2 97%   BMI 25.27 kg/m²     Physical Exam   Constitutional: She appears well-developed and well-nourished.   HENT:   Head: Normocephalic and atraumatic.   Nose: Nose normal.   Mouth/Throat: Oropharynx is clear and moist.   Eyes: Conjunctivae are normal.   Neck: No JVD present.   Cardiovascular: Normal rate, regular rhythm and normal heart sounds.    Pulmonary/Chest: Effort normal and breath sounds normal.   Abdominal: Soft. Bowel sounds are normal. She exhibits no distension. There is no tenderness. There is no rebound and no guarding.   Musculoskeletal: Normal range of motion.   Lymphadenopathy:     She has no cervical adenopathy.   Neurological: She is alert.   Skin: Skin is warm and dry.   Psychiatric: She has a normal mood and affect. Her behavior is normal.   Nursing note and vitals reviewed.      Assessment:     1. Essential hypertension    2. Alzheimer's dementia without behavioral disturbance, unspecified timing of dementia onset    SBO resolved.  Plan:     Essential hypertension    Alzheimer's dementia without " behavioral disturbance, unspecified timing of dementia onset    Same therapy.

## 2018-02-08 ENCOUNTER — OUTPATIENT CASE MANAGEMENT (OUTPATIENT)
Dept: ADMINISTRATIVE | Facility: OTHER | Age: 83
End: 2018-02-08

## 2018-02-08 NOTE — PROGRESS NOTES
Attempt to follow up for Outpatient Care Management; left message requesting return call. Johnny ALCANTARA-OP[facility]    Follow up plan:    Continue to educate about Hypertension. Review signs and symptoms of hypertension/hypotension. Encourage patient to follow medication and treatment regimen. Encourage patient to maintain follow up with doctors.  Continue to educate about Dementia. Review signs and symptoms of Dementia. Encourage patient to follow medication and treatment regimen. Encourage patient to maintain follow up with doctors.

## 2018-02-09 PROBLEM — K56.609 SBO (SMALL BOWEL OBSTRUCTION): Status: RESOLVED | Noted: 2017-11-20 | Resolved: 2018-02-09

## 2018-03-01 ENCOUNTER — OUTPATIENT CASE MANAGEMENT (OUTPATIENT)
Dept: ADMINISTRATIVE | Facility: OTHER | Age: 83
End: 2018-03-01

## 2018-03-01 NOTE — PROGRESS NOTES
Spoke to pt who reports her BP has been wnl. Pt reports her  takes her BP daily and logs it. Discussed importance of pt monitoring her BP daily and logging it. Discussed with pt notifying PCP if BP is >140/90. Discussed signs and symptoms of hypertension. Pt currently denies any signs of hypertension. Pt denies any falls since we last spoke. Discussed fall prevention and home safety with pt. Pt reports she feels her memory is the same as it was the last time we spoke. Pt reports her  is home with her all day. Pt responded to all questions appropriately and in a timely manner.  Will follow up. OLIVER Nobles RN-OPCM      Follow up plan:    Continue to educate about Hypertension. Review signs and symptoms of hypertension/hypotension. Encourage patient to follow medication and treatment regimen. Encourage patient to maintain follow up with doctors.  Continue to educate about Dementia. Review signs and symptoms of Dementia. Encourage patient to follow medication and treatment regimen. Encourage patient to maintain follow up with doctors.

## 2018-03-07 ENCOUNTER — PES CALL (OUTPATIENT)
Dept: ADMINISTRATIVE | Facility: CLINIC | Age: 83
End: 2018-03-07

## 2018-03-08 ENCOUNTER — OFFICE VISIT (OUTPATIENT)
Dept: PSYCHIATRY | Facility: CLINIC | Age: 83
End: 2018-03-08
Payer: MEDICARE

## 2018-03-08 VITALS
HEART RATE: 73 BPM | DIASTOLIC BLOOD PRESSURE: 70 MMHG | WEIGHT: 140.44 LBS | BODY MASS INDEX: 23.98 KG/M2 | SYSTOLIC BLOOD PRESSURE: 164 MMHG | HEIGHT: 64 IN

## 2018-03-08 DIAGNOSIS — F32.4 MDD (MAJOR DEPRESSIVE DISORDER), SINGLE EPISODE, IN PARTIAL REMISSION: ICD-10-CM

## 2018-03-08 DIAGNOSIS — G30.9 ALZHEIMER'S DEMENTIA WITHOUT BEHAVIORAL DISTURBANCE, UNSPECIFIED TIMING OF DEMENTIA ONSET: ICD-10-CM

## 2018-03-08 DIAGNOSIS — F02.80 ALZHEIMER'S DEMENTIA WITHOUT BEHAVIORAL DISTURBANCE, UNSPECIFIED TIMING OF DEMENTIA ONSET: ICD-10-CM

## 2018-03-08 PROCEDURE — 99214 OFFICE O/P EST MOD 30 MIN: CPT | Mod: S$PBB,,, | Performed by: PSYCHIATRY & NEUROLOGY

## 2018-03-08 PROCEDURE — 99212 OFFICE O/P EST SF 10 MIN: CPT | Mod: PBBFAC | Performed by: PSYCHIATRY & NEUROLOGY

## 2018-03-08 PROCEDURE — 99999 PR PBB SHADOW E&M-EST. PATIENT-LVL II: CPT | Mod: PBBFAC,,, | Performed by: PSYCHIATRY & NEUROLOGY

## 2018-03-08 RX ORDER — MEMANTINE HYDROCHLORIDE 10 MG/1
10 TABLET ORAL 2 TIMES DAILY
Qty: 180 TABLET | Refills: 1 | Status: SHIPPED | OUTPATIENT
Start: 2018-03-08 | End: 2019-03-12

## 2018-03-08 RX ORDER — ESCITALOPRAM OXALATE 20 MG/1
20 TABLET ORAL DAILY
Qty: 90 TABLET | Refills: 1 | Status: SHIPPED | OUTPATIENT
Start: 2018-03-08 | End: 2018-08-07 | Stop reason: SDUPTHER

## 2018-03-08 NOTE — PROGRESS NOTES
Outpatient Psychiatry Follow-Up Visit (MD/NP)    3/8/2018    Clinical Status of Patient:  Outpatient (Ambulatory)    Chief Complaint:  Lesa Holder is a 89 y.o. female who presents today for follow-up of psychosis, depression, and dementia.     Met with patient and spouse.      Interval History and Content of Current Session:  Interim Events/Subjective Report/Content of Current Session:     Pt reports she is doing okay except for arthritis in her legs.  She has been having sinus problems and a recent GI obstruction.  She reports problems with energy, awakening in the morning feeling good but feeling tired by 10 AM.  She starts to feel better after noon.  She sleeps 10 pm to 7 am.  She denies taking naps.  She reports her memory is bad.  She states she puts something down and forgets where she puts it.  Does not leave the kitchen until she is done so she does not burn food or leave the water running.  She denies worry or disturbing thoughts.  Denies hallucinations.  Denies sadness.  Denies desire for death.       reports she no longer cooks.  He does not know why.  Her memory is getting worse.      Psychotherapy:  · Target symptoms: depression, psychosis, memory loss  · Why chosen therapy is appropriate versus another modality: relevant to diagnosis  · Outcome monitoring methods: self-report, feedback from family  · Therapeutic intervention type: supportive psychotherapy  · Topics discussed/themes: stress related to medical comorbidities, life stage transitional issues  · The patient's response to the intervention is accepting. The patient's progress toward treatment goals is good.   · Duration of intervention: 5 mins    Review of Systems   Review of Systems   Constitutional: Positive for weight loss.   Respiratory: Positive for cough.    Cardiovascular: Positive for palpitations. Negative for chest pain.   Gastrointestinal: Negative for abdominal pain, nausea and vomiting.   Genitourinary: Negative for  "dysuria and frequency.   Musculoskeletal: Positive for falls and joint pain.         Past Medical, Family and Social History: The patient's past medical, family and social history have been reviewed and updated as appropriate within the electronic medical record - see encounter notes.    Compliance:  As above    Side effects: None    Risk Parameters:  Patient reports no suicidal ideation  Patient reports no homicidal ideation  Patient reports no self-injurious behavior  Patient reports no violent behavior    Exam (detailed: at least 9 elements; comprehensive: all 15 elements)   Constitutional  Vitals:  Most recent vital signs, dated less than 90 days prior to this appointment, were not reviewed.    Vitals:    03/08/18 1645   BP: (!) 164/70   BP Location: Left arm   Patient Position: Sitting   BP Method: Large (Automatic)   Pulse: 73   Weight: 63.7 kg (140 lb 6.9 oz)   Height: 5' 4" (1.626 m)      General:  age appropriate, neatly groomed, overweight     Musculoskeletal  Muscle Strength/Tone:  no dyskinesia, no dystonia, no tremor   Gait & Station:  uses cane     Psychiatric  Speech:  normal tone, normal rate, normal pitch, normal volume, no slurring, not pressured   Mood:   Affect:  euthymic  mildly constricted but mostly bright   Thought Process:  goal-directed, logical   Associations:  intact   Thought Content:  normal, no suicidality, no homicidality, delusions, or paranoia   Insight  Judgement:  good  adequate to circumstances   Orientation:  person, place, situation, incorrect day of week, correct year, incorrect month   Memory: intact for some recent events, names president correctly after prompting   Language: grossly intact   Attention Span & Concentration:  able to focus   Fund of Knowledge:  intact and appropriate to age and level of education     Assessment and Diagnosis   Status/Progress: Based on the examination today, the patient's problem(s) is/are fairly inadequately controlled.  New problems have " not been presented today.   Comorbidities are complicating management of the primary condition.  There are no active rule-out diagnoses for this patient at this time.    General Impression: the patient had no prior psychiatric history before her admission to [facility] in 2011.  During that time she was confused and depressed.  Subsequent to her discharge an attempt was made to take her off of antipsychotic medication which resulted in her developing delusions of that she was sexually arousing strange men.  Currently she is on medication regimen that includes antidepressants as well as memory preserving medications.    Diagnosis:  Hansel. 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  hyperlipidemia, hypertension, arthritis        Intervention/Counseling/Treatment Plan   · Continue Aricept 10 mg twice daily  · Continue Lexapro 20 mg daily  · Continue Namenda 10 mg twice daily.    · Discussed holding each psych med for one day each to see if any of them are causing her AM fatigue.          Return to Clinic: 6 months

## 2018-04-06 ENCOUNTER — OUTPATIENT CASE MANAGEMENT (OUTPATIENT)
Dept: ADMINISTRATIVE | Facility: OTHER | Age: 83
End: 2018-04-06

## 2018-04-06 NOTE — PROGRESS NOTES
Spoke to pt who reports her BP has been wnl. Pt reports her  takes her BP daily and logs it but she cannot locate the log sheet. Pt reports her  knows the parameters her PCP has in place and when to contact her PCP.  Discussed importance of pt monitoring her BP daily and logging it.Pt currently denies any signs of hypertension. Pt denies any falls since we last spoke. Discussed fall prevention and home safety with pt. Pt reports she feels her memory is the same but reports she now tries to take precautions on her own as well as her  being with her at all times. Pt reports she will stay in her kitchen until she is completely done so she does not leave the stove on or water running. Discussed deficits resulting from Dementia ways to overcome those deficits.  Discussed [facility] with pt due to her memory issues and co-morbities. Pt reports she would like to be contacted and enroll.  This nurse sent referral to [facility].  Patient has contact information for OPCM in the event that needs develop as well as [facility] contact information. Will close case at this time. OLIVER Gonzalez RN-OPCM

## 2018-04-09 ENCOUNTER — OFFICE VISIT (OUTPATIENT)
Dept: INTERNAL MEDICINE | Facility: CLINIC | Age: 83
End: 2018-04-09
Payer: MEDICARE

## 2018-04-09 VITALS
WEIGHT: 134.5 LBS | BODY MASS INDEX: 23.83 KG/M2 | DIASTOLIC BLOOD PRESSURE: 70 MMHG | SYSTOLIC BLOOD PRESSURE: 136 MMHG | HEIGHT: 63 IN | RESPIRATION RATE: 16 BRPM | HEART RATE: 68 BPM | TEMPERATURE: 98 F | OXYGEN SATURATION: 98 %

## 2018-04-09 DIAGNOSIS — K21.9 GASTROESOPHAGEAL REFLUX DISEASE, ESOPHAGITIS PRESENCE NOT SPECIFIED: ICD-10-CM

## 2018-04-09 DIAGNOSIS — F02.80 ALZHEIMER'S DEMENTIA WITHOUT BEHAVIORAL DISTURBANCE, UNSPECIFIED TIMING OF DEMENTIA ONSET: Primary | Chronic | ICD-10-CM

## 2018-04-09 DIAGNOSIS — R05.3 CHRONIC COUGH: ICD-10-CM

## 2018-04-09 DIAGNOSIS — G30.9 ALZHEIMER'S DEMENTIA WITHOUT BEHAVIORAL DISTURBANCE, UNSPECIFIED TIMING OF DEMENTIA ONSET: Primary | Chronic | ICD-10-CM

## 2018-04-09 PROCEDURE — 99213 OFFICE O/P EST LOW 20 MIN: CPT | Mod: PBBFAC,PN | Performed by: INTERNAL MEDICINE

## 2018-04-09 PROCEDURE — 99213 OFFICE O/P EST LOW 20 MIN: CPT | Mod: S$PBB,,, | Performed by: INTERNAL MEDICINE

## 2018-04-09 PROCEDURE — 99999 PR PBB SHADOW E&M-EST. PATIENT-LVL III: CPT | Mod: PBBFAC,,, | Performed by: INTERNAL MEDICINE

## 2018-04-09 RX ORDER — AZELASTINE 1 MG/ML
1 SPRAY, METERED NASAL
COMMUNITY
Start: 2018-03-02 | End: 2022-03-07

## 2018-04-09 RX ORDER — ESOMEPRAZOLE MAGNESIUM 40 MG/1
40 CAPSULE, DELAYED RELEASE ORAL DAILY
Qty: 30 CAPSULE | Refills: 3 | Status: SHIPPED | OUTPATIENT
Start: 2018-04-09 | End: 2018-07-09

## 2018-04-09 NOTE — PROGRESS NOTES
"Subjective:      Patient ID: Lesa Holder is a 89 y.o. female.    Chief Complaint: Establish Care; Abdominal Pain (x 1 week); Cough (x 1 month); Sore Throat (x 1 week); Gastroesophageal Reflux; and Fatigue    HPI: 89 y.o. Black or  female , has had on/off abdominal pain : epigastrium, lower, middle.  Sometimes diarrhea, sometimes constipation, sometimes heartburn.   States has had GERD in the past.  States  She has had fatigue, cough for > 1 month.  Here alone, usually comes in with , because he is the caregiver.      Review of Systems   Constitutional: Positive for activity change, appetite change and fatigue.   HENT: Positive for sore throat. Negative for congestion, nosebleeds, trouble swallowing and voice change.    Eyes: Negative.    Respiratory: Positive for cough and choking. Negative for chest tightness and shortness of breath.    Cardiovascular: Negative for chest pain and palpitations.   Gastrointestinal: Positive for abdominal pain, constipation and diarrhea.   Endocrine: Negative.    Genitourinary: Negative.    Musculoskeletal: Negative.    Skin: Negative.    Allergic/Immunologic: Negative.    Neurological: Positive for headaches. Negative for dizziness, weakness and numbness.   Hematological: Negative for adenopathy.   Psychiatric/Behavioral: Negative.        Objective:   /70 (BP Location: Left arm, Patient Position: Sitting, BP Method: Medium (Manual))   Pulse 68   Temp 98.2 °F (36.8 °C) (Oral)   Resp 16   Ht 5' 3" (1.6 m)   Wt 61 kg (134 lb 8 oz)   SpO2 98%   BMI 23.83 kg/m²     Physical Exam   Constitutional: She appears well-developed and well-nourished.   HENT:   Head: Normocephalic and atraumatic.   Right Ear: External ear normal.   Left Ear: External ear normal.   Nose: Nose normal.   Mouth/Throat: Oropharynx is clear and moist.   Eyes: Conjunctivae are normal. Pupils are equal, round, and reactive to light.   Neck: Neck supple. No JVD present. "   Cardiovascular: Normal rate, regular rhythm and normal heart sounds.    Pulmonary/Chest: Effort normal and breath sounds normal.   Abdominal: Soft. Bowel sounds are normal. She exhibits no distension and no mass. There is no guarding.   Skin: Skin is warm and dry.   Psychiatric: She has a normal mood and affect. Her behavior is normal.   Nursing note and vitals reviewed.      Assessment:     1. Alzheimer's dementia without behavioral disturbance, unspecified timing of dementia onset    2. Chronic cough    3. Gastroesophageal reflux disease, esophagitis presence not specified    I think both the GERD and the chronic cough are related. She may have a component of swallowing disorder,  That is leading to minor aspiration. Will keep an eye on this.  Previous CXR are negative.  Plan:     Alzheimer's dementia without behavioral disturbance, unspecified timing of dementia onset    Chronic cough    Gastroesophageal reflux disease, esophagitis presence not specified  -     esomeprazole (NEXIUM) 40 MG capsule; Take 1 capsule (40 mg total) by mouth Daily.  Dispense: 30 capsule; Refill: 3

## 2018-07-09 ENCOUNTER — OFFICE VISIT (OUTPATIENT)
Dept: INTERNAL MEDICINE | Facility: CLINIC | Age: 83
End: 2018-07-09
Payer: MEDICARE

## 2018-07-09 VITALS
WEIGHT: 134.88 LBS | OXYGEN SATURATION: 99 % | SYSTOLIC BLOOD PRESSURE: 118 MMHG | HEART RATE: 66 BPM | HEIGHT: 63 IN | BODY MASS INDEX: 23.9 KG/M2 | RESPIRATION RATE: 16 BRPM | DIASTOLIC BLOOD PRESSURE: 60 MMHG

## 2018-07-09 DIAGNOSIS — R05.3 COUGH, PERSISTENT: ICD-10-CM

## 2018-07-09 DIAGNOSIS — G30.9 ALZHEIMER'S DEMENTIA WITHOUT BEHAVIORAL DISTURBANCE, UNSPECIFIED TIMING OF DEMENTIA ONSET: Primary | Chronic | ICD-10-CM

## 2018-07-09 DIAGNOSIS — K21.9 GASTROESOPHAGEAL REFLUX DISEASE, ESOPHAGITIS PRESENCE NOT SPECIFIED: ICD-10-CM

## 2018-07-09 DIAGNOSIS — F02.80 ALZHEIMER'S DEMENTIA WITHOUT BEHAVIORAL DISTURBANCE, UNSPECIFIED TIMING OF DEMENTIA ONSET: Primary | Chronic | ICD-10-CM

## 2018-07-09 PROCEDURE — 99214 OFFICE O/P EST MOD 30 MIN: CPT | Mod: PBBFAC,PN | Performed by: INTERNAL MEDICINE

## 2018-07-09 PROCEDURE — 99999 PR PBB SHADOW E&M-EST. PATIENT-LVL IV: CPT | Mod: PBBFAC,,, | Performed by: INTERNAL MEDICINE

## 2018-07-09 PROCEDURE — 99213 OFFICE O/P EST LOW 20 MIN: CPT | Mod: S$PBB,,, | Performed by: INTERNAL MEDICINE

## 2018-07-09 RX ORDER — OMEPRAZOLE 40 MG/1
40 CAPSULE, DELAYED RELEASE ORAL DAILY
Qty: 30 CAPSULE | Refills: 11 | Status: ON HOLD | OUTPATIENT
Start: 2018-07-09 | End: 2019-03-13 | Stop reason: HOSPADM

## 2018-07-09 NOTE — PROGRESS NOTES
"Subjective:      Patient ID: Lesa Holder is a 89 y.o. female.    Chief Complaint: Follow-up (3 month follow up); Medication Refill; Dizziness (x 2 weeks); and Cough    HPI: 89 y.o. Black or  female , states she has been coughing daily for quite a while.  States she thinks it is because of her GERD, that is giving her more trouble now. Not SOB or wheezing.  No fever, chills. No CP, palpitations.  who is her primary caretaker not here today, came in with son.  She has not lost/gained weight.  States she is careful in the kitchen, finishes what she has to do, and does not return to it again.        Review of Systems   Constitutional: Negative.    HENT: Negative for congestion, drooling, postnasal drip and trouble swallowing.    Respiratory: Positive for cough. Negative for chest tightness, shortness of breath and wheezing.    Cardiovascular: Negative for chest pain and palpitations.   Gastrointestinal:        Reflux   Endocrine: Negative.    Genitourinary: Negative.    Musculoskeletal: Negative.    Neurological: Negative.    Psychiatric/Behavioral: Positive for decreased concentration.       Objective:   /60 (BP Location: Right arm, Patient Position: Sitting, BP Method: Medium (Manual))   Pulse 66   Resp 16   Ht 5' 3" (1.6 m)   Wt 61.2 kg (134 lb 14.4 oz)   SpO2 99%   BMI 23.90 kg/m²     Physical Exam   Constitutional: She appears well-developed and well-nourished.   HENT:   Head: Normocephalic and atraumatic.   Right Ear: External ear normal.   Left Ear: External ear normal.   Nose: Nose normal.   Mouth/Throat: Oropharynx is clear and moist.   Eyes: Conjunctivae and EOM are normal.   Neck: Normal range of motion. Neck supple.   Cardiovascular: Normal rate, regular rhythm and normal heart sounds.    Pulmonary/Chest: Effort normal and breath sounds normal. She has no wheezes. She has no rales.   Abdominal: Soft. Bowel sounds are normal.   Musculoskeletal: Normal range of motion. "   Neurological: She is alert.   Skin: Skin is warm and dry.   Psychiatric: She has a normal mood and affect. Her behavior is normal.   Nursing note and vitals reviewed.      Assessment:     1. Alzheimer's dementia without behavioral disturbance, unspecified timing of dementia onset    2. Cough, persistent    3. Gastroesophageal reflux disease, esophagitis presence not specified    Will check CXR to see if pt has aspirated.  Also change Nexium.  She may need a swallow study.  Plan:     Alzheimer's dementia without behavioral disturbance, unspecified timing of dementia onset    Cough, persistent  -     X-Ray Chest PA And Lateral; Future; Expected date: 07/09/2018    Gastroesophageal reflux disease, esophagitis presence not specified  -     omeprazole (PRILOSEC) 40 MG capsule; Take 1 capsule (40 mg total) by mouth once daily.  Dispense: 30 capsule; Refill: 11

## 2018-08-07 DIAGNOSIS — F32.4 MDD (MAJOR DEPRESSIVE DISORDER), SINGLE EPISODE, IN PARTIAL REMISSION: ICD-10-CM

## 2018-08-07 RX ORDER — ESCITALOPRAM OXALATE 20 MG/1
20 TABLET ORAL DAILY
Qty: 90 TABLET | Refills: 1 | Status: SHIPPED | OUTPATIENT
Start: 2018-08-07 | End: 2019-04-03 | Stop reason: SDUPTHER

## 2018-11-02 DIAGNOSIS — I10 ESSENTIAL HYPERTENSION: ICD-10-CM

## 2018-11-02 RX ORDER — LOSARTAN POTASSIUM 50 MG/1
50 TABLET ORAL DAILY
Qty: 90 TABLET | Refills: 3 | Status: SHIPPED | OUTPATIENT
Start: 2018-11-02 | End: 2019-02-13 | Stop reason: SDUPTHER

## 2018-11-02 NOTE — TELEPHONE ENCOUNTER
----- Message from Amy Blandon sent at 11/2/2018  8:41 AM CDT -----  Contact: Self/ [phone]  Patient called in to get prescription refill. Please call and advise.     losartan (COZAAR) 50 MG tablet    [facility] [address] [place] [address] [facility] [address]   Pt states she has not been exactly watching her salt intake in her diet. Pt states she has been having pains in her neck and hip. Pt states she has been using Biofreeze and applying a heating pad to the affected area after using the Biofreeze. Pt states she is wondering if elevated BP maybe related to pain and salt intake. Pt states she is taking Chlorthalidone 25mg QD. Labetalol 100mg 1 tablet in AM, 1.5 tablets in PM, Tekturna 300mg every evening.Please advise.

## 2019-01-07 DIAGNOSIS — F02.80 ALZHEIMER'S DEMENTIA WITHOUT BEHAVIORAL DISTURBANCE, UNSPECIFIED TIMING OF DEMENTIA ONSET: ICD-10-CM

## 2019-01-07 DIAGNOSIS — G30.9 ALZHEIMER'S DEMENTIA WITHOUT BEHAVIORAL DISTURBANCE, UNSPECIFIED TIMING OF DEMENTIA ONSET: ICD-10-CM

## 2019-01-07 RX ORDER — DONEPEZIL HYDROCHLORIDE 10 MG/1
10 TABLET, FILM COATED ORAL 2 TIMES DAILY
Qty: 180 TABLET | Refills: 1 | Status: SHIPPED | OUTPATIENT
Start: 2019-01-07 | End: 2019-05-17

## 2019-02-13 DIAGNOSIS — I10 ESSENTIAL HYPERTENSION: ICD-10-CM

## 2019-02-13 RX ORDER — LOSARTAN POTASSIUM 50 MG/1
50 TABLET ORAL DAILY
Qty: 90 TABLET | Refills: 3 | Status: SHIPPED | OUTPATIENT
Start: 2019-02-13 | End: 2019-11-21 | Stop reason: SDUPTHER

## 2019-02-13 NOTE — TELEPHONE ENCOUNTER
----- Message from Sandi Perez sent at 2/13/2019  8:37 AM CST -----  No.  [phone]   [facility] in [place] told patient to call the office for the refill.  Patient needs script for Losartan 50mg.

## 2019-03-12 PROBLEM — R10.13 EPIGASTRIC PAIN: Status: ACTIVE | Noted: 2019-03-12

## 2019-03-17 ENCOUNTER — NURSE TRIAGE (OUTPATIENT)
Dept: ADMINISTRATIVE | Facility: CLINIC | Age: 84
End: 2019-03-17

## 2019-03-17 NOTE — TELEPHONE ENCOUNTER
Reason for Disposition   SEVERE dizziness (e.g., unable to stand, requires support to walk, feels like passing out now)    Protocols used: DIZZINESS - [id number]

## 2019-03-18 ENCOUNTER — PES CALL (OUTPATIENT)
Dept: ADMINISTRATIVE | Facility: CLINIC | Age: 84
End: 2019-03-18

## 2019-03-20 ENCOUNTER — OFFICE VISIT (OUTPATIENT)
Dept: FAMILY MEDICINE | Facility: CLINIC | Age: 84
End: 2019-03-20
Payer: MEDICARE

## 2019-03-20 VITALS
HEART RATE: 64 BPM | DIASTOLIC BLOOD PRESSURE: 60 MMHG | RESPIRATION RATE: 16 BRPM | SYSTOLIC BLOOD PRESSURE: 130 MMHG | OXYGEN SATURATION: 98 % | HEIGHT: 65 IN | TEMPERATURE: 98 F | BODY MASS INDEX: 27.76 KG/M2 | WEIGHT: 166.63 LBS

## 2019-03-20 DIAGNOSIS — Z23 NEED FOR PROPHYLACTIC VACCINATION WITH STREPTOCOCCUS PNEUMONIAE (PNEUMOCOCCUS) AND INFLUENZA VACCINES: ICD-10-CM

## 2019-03-20 DIAGNOSIS — F02.80 LATE ONSET ALZHEIMER'S DISEASE WITHOUT BEHAVIORAL DISTURBANCE: Chronic | ICD-10-CM

## 2019-03-20 DIAGNOSIS — K21.9 GASTROESOPHAGEAL REFLUX DISEASE WITHOUT ESOPHAGITIS: Primary | ICD-10-CM

## 2019-03-20 DIAGNOSIS — I27.20 PULMONARY HYPERTENSION: ICD-10-CM

## 2019-03-20 DIAGNOSIS — F33.42 RECURRENT MAJOR DEPRESSIVE DISORDER, IN FULL REMISSION: ICD-10-CM

## 2019-03-20 DIAGNOSIS — I35.0 AORTIC STENOSIS, MODERATE: Chronic | ICD-10-CM

## 2019-03-20 DIAGNOSIS — G30.1 LATE ONSET ALZHEIMER'S DISEASE WITHOUT BEHAVIORAL DISTURBANCE: Chronic | ICD-10-CM

## 2019-03-20 DIAGNOSIS — I10 ESSENTIAL HYPERTENSION: Chronic | ICD-10-CM

## 2019-03-20 PROBLEM — R10.13 EPIGASTRIC PAIN: Status: RESOLVED | Noted: 2019-03-12 | Resolved: 2019-03-20

## 2019-03-20 PROCEDURE — 99999 PR PBB SHADOW E&M-EST. PATIENT-LVL V: ICD-10-PCS | Mod: PBBFAC,,, | Performed by: FAMILY MEDICINE

## 2019-03-20 PROCEDURE — 99999 PR PBB SHADOW E&M-EST. PATIENT-LVL V: CPT | Mod: PBBFAC,,, | Performed by: FAMILY MEDICINE

## 2019-03-20 PROCEDURE — 99214 PR OFFICE/OUTPT VISIT, EST, LEVL IV, 30-39 MIN: ICD-10-PCS | Mod: S$PBB,,, | Performed by: FAMILY MEDICINE

## 2019-03-20 PROCEDURE — G0009 ADMIN PNEUMOCOCCAL VACCINE: HCPCS | Mod: PBBFAC,PN

## 2019-03-20 PROCEDURE — 99214 OFFICE O/P EST MOD 30 MIN: CPT | Mod: S$PBB,,, | Performed by: FAMILY MEDICINE

## 2019-03-20 PROCEDURE — 99215 OFFICE O/P EST HI 40 MIN: CPT | Mod: PBBFAC,PN,25 | Performed by: FAMILY MEDICINE

## 2019-03-20 RX ORDER — MEMANTINE HYDROCHLORIDE 5 MG/1
5 TABLET ORAL 2 TIMES DAILY
Qty: 180 TABLET | Refills: 1 | Status: SHIPPED | OUTPATIENT
Start: 2019-03-20 | End: 2019-07-08 | Stop reason: SINTOL

## 2019-03-20 NOTE — ASSESSMENT & PLAN NOTE
- well controlled  - continue current meds: Lexapro 20 mg daily  - reviewed Psychaitry note 3/2018

## 2019-03-20 NOTE — ASSESSMENT & PLAN NOTE
- counseling patient and son on diet and how to properly take medication  - questions elicited and answered

## 2019-03-20 NOTE — LETTER
March 20, 2019      Pallavi Sunkara, MD  [address]  [facility] [address]           [place] [address] [facility]   [facility] [address]  Phone: [phone]  Fax: [phone]          Patient: Lesa Holder   MR Number: [id number]   YOB: 1927   Date of Visit: 3/20/2019       Dear Dr. Pallavi Sunkara:    Thank you for referring Lesa Holder to me for evaluation. Attached you will find relevant portions of my assessment and plan of care.    If you have questions, please do not hesitate to call me. I look forward to following Lesa Holder along with you.    Sincerely,    Queta Badillo,     Enclosure  CC:  No Recipients    If you would like to receive this communication electronically, please contact [email or web address] or [phone] to request more information on [employer] access.    For providers and/or their staff who would like to refer a patient to [facility], please contact us through our one-stop-shop provider referral line, [facility], at [phone].    If you feel you have received this communication in error or would no longer like to receive these types of communications, please e-mail [email or web address]

## 2019-03-20 NOTE — ASSESSMENT & PLAN NOTE
- reviewed last Psychiatry note  - appears pt should also be take Memantine 5 mg twice a day along with her aricept  - restart memantine   - continue aricept

## 2019-03-20 NOTE — PROGRESS NOTES
"HOSPITAL FOLLOW-UP/TCM    Patient Active Problem List   Diagnosis    Essential hypertension    Late onset Alzheimer's disease without behavioral disturbance    Depression    Gastroesophageal reflux disease    Aortic stenosis, moderate    Pulmonary hypertension       CC:   Chief Complaint   Patient presents with    Hospital Follow Up     3/17/2019 from ED     Heartburn       HPI: Lesa Holder   is a 89 y.o. female with hypertension, pulmonary hypertension, dementia, GERD, and moderate aortic stenosis is here for follow-up from recent admission [facility] from 3/12/19 to 3/13/19 for epigastric pain that started the night prior to presentation.     Pt reported burning type pain that was radiating from her chest to her throat with frequent belching. Symptoms seemed to be precede by drinking at least 3 cups of caffeinated tea. She received a "GI cocktail" without relief. CT abd pelvis showed diverticulosisbut otherwise no acute findings. Pain persisted and pt started to report radiation to the left side of her chest with associated shortness of breath and pt was admitted for further cardiac evaluation.     Pt was started on Protonix and Pepcid and symptoms appeared to improve. Troponin was negative x 3. Echo showed normal LVEF and diastolic dysfunction. Pt was discharged with pantoprazole and pepcid.     Pt presented to the ER after discharge on 3/17/19 with recurrent epigastric pain and headache. Headache frontal 10/10 so CT head was done and no acute findings. Pt was treated with Keflex 500 mg TID for UTI. 3/17/19 Ucx no growth    She presents today with son John. She lives at home with  and reports doing well. Epigastric pain has improved but still presents at times. Reports that appetite is "good." Pt is a popor historian and unfortunately son did not have must to offer at the time of appointment. He does report that she continues to complain about this issue. He is unsure if she is " taking the medication since she does not live with her but reports that he will discuss with her .     Family and/or Caretaker present at visit?  Yes. SonJohn  Diagnostic tests reviewed/disposition: I have reviewed all completed as well as pending diagnostic tests at the time of discharge.  Disease/illness education: yes  Home health/community services discussion/referrals: Patient does not have home health established from hospital visit.  They do not need home health.  If needed, we will set up home health for the patient.   Establishment or re-establishment of referral orders for community resources: No other necessary community resources.   Discussion with other health care providers: No discussion with other health care providers necessary.   TCC completed: none     ROS: Review of Systems   Constitutional: Negative for activity change, appetite change, fatigue, fever and unexpected weight change.   HENT: Negative for trouble swallowing and voice change.    Eyes: Negative for visual disturbance.   Respiratory: Negative for cough, chest tightness and shortness of breath.    Cardiovascular: Negative for chest pain, palpitations and leg swelling.   Gastrointestinal: Positive for abdominal pain. Negative for abdominal distention, blood in stool, constipation, diarrhea, nausea and vomiting.   Endocrine: Negative for cold intolerance, heat intolerance, polydipsia, polyphagia and polyuria.   Genitourinary: Negative for difficulty urinating, flank pain, frequency, hematuria and urgency.   Musculoskeletal: Positive for arthralgias. Negative for joint swelling and myalgias.   Skin: Negative for rash.   Neurological: Negative for dizziness, weakness, light-headedness and headaches.   Psychiatric/Behavioral: Negative for behavioral problems and sleep disturbance. The patient is not nervous/anxious.        PMHX:   Past Medical History:   Diagnosis Date    Cataract 1998    remove from left eye    Dementia      Depression     GERD (gastroesophageal reflux disease)     Hypertension        PSHX:   Past Surgical History:   Procedure Laterality Date    HYSTERECTOMY      LAPAROTOMY-EXPLORATORY N/A 11/27/2017    [facility]    RESECTION-SMALL BOWEL  11/27/2017    Per[facility] at [facility] OR       FAMHX:   Family History   Problem Relation Age of Onset    Cancer Daughter         Lung    Heart disease Neg Hx        SOCHX:   Social History     Social History Narrative    Lives her . 4 adult children (1 daughter passed, 2 sons passed, 1 son living Peter). 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 Attends [employer]. Denies alcohol, smoking or illicit drugs.        ALLERGIES: Review of patient's allergies indicates:  No Known Allergies    MEDS:     Current Outpatient Medications on File Prior to Visit   Medication Sig Dispense Refill    aspirin 81 MG Chew Take 1 tablet (81 mg total) by mouth once daily.      azelastine (ASTELIN) 137 mcg (0.1 %) nasal spray 1 spray by Nasal route as needed.      cephALEXin (KEFLEX) 500 MG capsule Take 1 capsule (500 mg total) by mouth every 8 (eight) hours. for 7 days 21 capsule 0    donepezil (ARICEPT) 10 MG tablet Take 1 tablet (10 mg total) by mouth 2 (two) times daily. 180 tablet 1    escitalopram oxalate (LEXAPRO) 20 MG tablet Take 1 tablet (20 mg total) by mouth once daily. 90 tablet 1    famotidine (PEPCID) 20 MG tablet Take 1 tablet (20 mg total) by mouth once daily. 30 tablet 1    loratadine (CLARITIN) 10 mg tablet Take 1 tablet (10 mg total) by mouth once daily. 30 tablet 0    losartan (COZAAR) 50 MG tablet Take 1 tablet (50 mg total) by mouth once daily. 90 tablet 3    multivitamin capsule Take 1 capsule by mouth once daily.      pantoprazole (PROTONIX) 40 MG tablet Take 1 tablet (40 mg total) by mouth once daily. 30 tablet 1     No current facility-administered medications on file prior to visit.   "      OBJECTIVE:   Vitals:    03/20/19 1254   BP: 130/60   BP Location: Left arm   Patient Position: Sitting   BP Method: Large (Manual)   Pulse: 64   Resp: 16   Temp: 98.2 °F (36.8 °C)   TempSrc: Oral   SpO2: 98%   Weight: 75.6 kg (166 lb 9.6 oz)   Height: 5' 5" (1.651 m)     Body mass index is 27.72 kg/m².    Physical Exam   Constitutional: No distress.   Eyes: Conjunctivae are normal.   Neck: Neck supple.   Cardiovascular: Normal rate, regular rhythm and intact distal pulses.   Murmur heard.  Pulmonary/Chest: Effort normal and breath sounds normal. No stridor. No respiratory distress. She has no wheezes. She has no rales.   Abdominal: Soft. Bowel sounds are normal. She exhibits no distension. There is no tenderness. There is no guarding.   Musculoskeletal: She exhibits no edema.   Neurological: She is alert.   Skin: Skin is warm. Capillary refill takes less than 2 seconds.       Depression Patient Health Questionnaire 3/20/2019 4/9/2018 12/27/2017   In the last two weeks how often have you had little interest or pleasure in doing things 0 0 0   In the last two weeks how often have you felt down, depressed or hopeless 0 0 1   PHQ-2 Total Score 0 0 1       MMSE 3/20/2019   What is the (year), (season), (date), (day), (month)? 0   Where are we (state), (country), (town or city), (hospital), (floor)? 4   Name 3 common objects (eg. "apple", "table", "bry"). Take 1 second to say each. Then ask the patient to repeat all 3. Give 1 point for each correct answer. Then repeat them until he/she learns all 3. Count trials and record. 3   Serial 7's backwards. Stop after 5 answers. (100,93,86,79,72) or alternatively  spell "WORLD" backwards. (D..L..R..O..W). The score is the number of letters in correct order. 2   Ask for the 3 common objects named earlier in the exam. Give 1 point for each correct answer. 3   Name a "pencil" and "watch." 2   Repeat the following: "No ifs, ands, or buts." 1   Follow a 3-stage command: "Take " "a paper in your right hand, fold it in half, & put it on the floor." 3   Read and obey the following: (see paper exam) 1   Write a sentence. 0   Copy the following design: (see paper exam) 0   Total MMSE Score 19   Some recent data might be hidden         PERTINANT RESULTS:   3/18/2019   CT HEAD WITHOUT CONTRAST    CLINICAL HISTORY:  Dizziness;    TECHNIQUE:  Low dose axial CT images obtained throughout the head without intravenous contrast. Sagittal and coronal reconstructions were performed.    COMPARISON:  None.    FINDINGS:  Intracranial compartment:    Ventricles and sulci are normal in size for age without evidence of hydrocephalus. No extra-axial blood or fluid collections.    Generalized cerebral volume loss.  The brain parenchyma appears normal. No parenchymal mass, hemorrhage, edema or major vascular distribution infarct.    Skull/extracranial contents (limited evaluation): No fracture. Mastoid air cells and paranasal sinuses are essentially clear.      Impression       Generalized cerebral volume loss.  No acute abnormality.  Follow-up as clinically indicated.     3/12/2019   CT ABDOMEN PELVIS WITHOUT CONTRAST    CLINICAL HISTORY:  Abdominal pain, unspecified;    TECHNIQUE:  Low dose axial images, sagittal and coronal reformations were obtained from the lung bases to the pubic symphysis.  Noncontrast    FINDINGS:  The visualized portion of the base of the lungs is significant for bilateral dependent atelectatic versus fibrotic change.  The visualized portion of the heart is significant for calcification of the aortic and mitral annulus.  The visualized portion of the heart, stomach, spleen, pancreas, liver, gallbladder, and adrenal glands are unremarkable.  The visualized portion of the aorta is significant for moderate scattered calcification.  The kidneys are unremarkable.  There is no hydronephrosis or nephrolithiasis.  The bladder is unremarkable.  The uterus is absent or atrophic.  The bowel is " significant for diverticulosis coli.  The appendix is not visualized.  There is a small fat containing left inguinal hernia.  The osseous structures demonstrate degenerative change.      Impression       Diverticulosis coli.     3/12/2019   XR CHEST AP PORTABLE    CLINICAL HISTORY:  epigastric pain;    TECHNIQUE:  Single frontal view of the chest was performed.    FINDINGS:  The lungs are clear.  There is no pneumothorax or pleural fluid.  The cardiac silhouette is not enlarged.  There is calcification of the aorta.  The osseous structures demonstrate degenerative change.      Impression       As above.       03/17/19   EKG 12-lead   Test Reason : R11.0,    Vent. Rate : 081 BPM     Atrial Rate : 081 BPM     P-R Int : 162 ms          QRS Dur : 118 ms      QT Int : 404 ms       P-R-T Axes : 059 -20 049 degrees     QTc Int : 469 ms    Normal sinus rhythm  LVH with QRS widening  Abnormal ECG  When compared with ECG of 12-MAR-2019 08:52,  Premature supraventricular complexes are no longer Present  Confirmed by Sunil Lyle MD ([id number]) on 3/19/2019 6:09:18 PM   3/12/19   TRANSTHORACIC ECHO (TTE) COMPLETE   · Normal left ventricular systolic function. The estimated ejection fraction is 65%  · Grade I (mild) left ventricular diastolic dysfunction consistent with impaired relaxation.  · Normal right ventricular systolic function.  · Moderate mitral sclerosis.  · Moderate tricuspid regurgitation.  · Mild to moderate aortic stenosis, ROBIN = 0.8 cm2, mean gradient = 17 mmHg.  · Mild mitral regurgitation.  · The estimated PA systolic pressure is 41 mm Hg     ASSESSMENT:  Problem List Items Addressed This Visit        Neuro    Late onset Alzheimer's disease without behavioral disturbance    Current Assessment & Plan     - reviewed last Psychiatry note  - appears pt should also be take Memantine 5 mg twice a day along with her aricept  - restart memantine   - continue aricept         Relevant Medications    memantine (NAMENDA) 5 MG  Tab       Psychiatric    Depression    Current Assessment & Plan     - well controlled  - continue current meds: Lexapro 20 mg daily  - reviewed Psychaitry note 3/2018            Pulmonary    Pulmonary hypertension    Overview     - 3/12/19 Echo: PA pressure 41  mmHg         Current Assessment & Plan     - asymptomatic  - stable from prior Echo            Cardiac/Vascular    Essential hypertension (Chronic)    Current Assessment & Plan     - well controlled  - continue current meds         Aortic stenosis, moderate (Chronic)    Overview     - 3/12/19 Echo: Mild to moderate aortic stenosis, ROBIN = 0.8 cm2, mean gradient = 17 mmHg.             Current Assessment & Plan     - asymptomatic  - stable            GI    Gastroesophageal reflux disease - Primary    Current Assessment & Plan     - counseling patient and son on diet and how to properly take medication  - questions elicited and answered               PLAN:   Orders Placed This Encounter    Pneumococcal Polysaccharide Vaccine (23 Valent) (SQ/IM)    memantine (NAMENDA) 5 MG Tab       Follow-up in 2 weeks    Dr. Queta Badillo D.O.   Family Medicine

## 2019-04-03 ENCOUNTER — OFFICE VISIT (OUTPATIENT)
Dept: FAMILY MEDICINE | Facility: CLINIC | Age: 84
End: 2019-04-03
Payer: MEDICARE

## 2019-04-03 VITALS
HEIGHT: 65 IN | TEMPERATURE: 98 F | WEIGHT: 137.13 LBS | RESPIRATION RATE: 16 BRPM | DIASTOLIC BLOOD PRESSURE: 72 MMHG | BODY MASS INDEX: 22.85 KG/M2 | OXYGEN SATURATION: 96 % | HEART RATE: 70 BPM | SYSTOLIC BLOOD PRESSURE: 130 MMHG

## 2019-04-03 DIAGNOSIS — K21.9 GASTROESOPHAGEAL REFLUX DISEASE WITHOUT ESOPHAGITIS: ICD-10-CM

## 2019-04-03 DIAGNOSIS — F33.42 RECURRENT MAJOR DEPRESSIVE DISORDER, IN FULL REMISSION: ICD-10-CM

## 2019-04-03 DIAGNOSIS — I35.0 AORTIC STENOSIS, MODERATE: Primary | Chronic | ICD-10-CM

## 2019-04-03 DIAGNOSIS — F32.4 MDD (MAJOR DEPRESSIVE DISORDER), SINGLE EPISODE, IN PARTIAL REMISSION: ICD-10-CM

## 2019-04-03 DIAGNOSIS — I10 ESSENTIAL HYPERTENSION: Chronic | ICD-10-CM

## 2019-04-03 DIAGNOSIS — G30.1 LATE ONSET ALZHEIMER'S DISEASE WITHOUT BEHAVIORAL DISTURBANCE: ICD-10-CM

## 2019-04-03 DIAGNOSIS — I27.20 PULMONARY HYPERTENSION: ICD-10-CM

## 2019-04-03 DIAGNOSIS — F02.80 LATE ONSET ALZHEIMER'S DISEASE WITHOUT BEHAVIORAL DISTURBANCE: ICD-10-CM

## 2019-04-03 PROCEDURE — 99999 PR PBB SHADOW E&M-EST. PATIENT-LVL IV: CPT | Mod: PBBFAC,,, | Performed by: FAMILY MEDICINE

## 2019-04-03 PROCEDURE — 99214 OFFICE O/P EST MOD 30 MIN: CPT | Mod: PBBFAC,PN | Performed by: FAMILY MEDICINE

## 2019-04-03 PROCEDURE — 99999 PR PBB SHADOW E&M-EST. PATIENT-LVL IV: ICD-10-PCS | Mod: PBBFAC,,, | Performed by: FAMILY MEDICINE

## 2019-04-03 PROCEDURE — 99214 PR OFFICE/OUTPT VISIT, EST, LEVL IV, 30-39 MIN: ICD-10-PCS | Mod: S$PBB,,, | Performed by: FAMILY MEDICINE

## 2019-04-03 PROCEDURE — 99214 OFFICE O/P EST MOD 30 MIN: CPT | Mod: S$PBB,,, | Performed by: FAMILY MEDICINE

## 2019-04-03 RX ORDER — ESCITALOPRAM OXALATE 20 MG/1
20 TABLET ORAL DAILY
Qty: 90 TABLET | Refills: 1 | Status: SHIPPED | OUTPATIENT
Start: 2019-04-03 | End: 2019-05-17 | Stop reason: SDUPTHER

## 2019-04-03 RX ORDER — PANTOPRAZOLE SODIUM 40 MG/1
40 TABLET, DELAYED RELEASE ORAL DAILY
Qty: 90 TABLET | Refills: 0 | Status: SHIPPED | OUTPATIENT
Start: 2019-04-03 | End: 2019-07-08 | Stop reason: ALTCHOICE

## 2019-04-03 NOTE — PROGRESS NOTES
FAMILY MEDICINE    Patient Active Problem List   Diagnosis    Essential hypertension    Late onset Alzheimer's disease without behavioral disturbance    Depression    Gastroesophageal reflux disease    Aortic stenosis, moderate    Pulmonary hypertension       CC:   Chief Complaint   Patient presents with    Follow-up       SUBJECTIVE:  Lesa Holder   is a 89 y.o. female  -  with hypertension, pulmonary hypertension, dementia, GERD, and moderate aortic stenosis presents for routine follow-up and brought in by her     1. Hypertension    Current medication treatment: Losartan 50 mg daily  Medication side effects: denies    Home BP cuff: none    2. Dementia    Age diagnosed: 87 yo  Neurology or Neuropsychiatry evaluation: yes Dr. Pedro Pablo Drake Psychaity     Last MMSE: unable to find recent MMSE    Current symptoms: forgetful, no wandering, sleeping well  Behavioral disturbanced: denies  Sleep: denies    Current medication regimen: Aricept 10 mg at bedtime and Namenda 5 mg BID  Living situation: lives with   DME: cane and walker    3. GERD    Age diagnosed: this year s/p multiple ER visits  Current treatment: Pepcid 20 mg daily and Protonix 40 mg daily    Symptoms: well controlled but had epigastric discomfort and acid feeling in the back of her throat with bloating  Are symptoms controlled? yes  Prior EGD: none    4. Depression  - diagnosed 2012 by Dr. Pedro Pablo Drake Psychkelsey    Current treatment: Lexapro 20 mg daily  Side effects of current treatment: denies    Symptoms related: denies and reports well controlled  Panic attacks: denies    Hopelessness: denies  Sleep: denies  Suicidal thoughts: denies  Thoughts of self harm:denies  Thoughts of harm to others: denies  History of suicide attempts: denies  Family history of suicide:denies    Support system:  and children      ROS: Review of Systems   Constitutional: Negative for activity change, appetite change, fatigue, fever and  unexpected weight change.   HENT: Negative for congestion, nosebleeds, trouble swallowing and voice change.    Eyes: Negative for photophobia and visual disturbance.   Respiratory: Negative for cough, chest tightness and shortness of breath.    Cardiovascular: Negative for chest pain, palpitations and leg swelling.   Gastrointestinal: Negative for abdominal distention, abdominal pain, blood in stool, constipation, diarrhea, nausea and vomiting.   Endocrine: Negative for cold intolerance, heat intolerance, polydipsia, polyphagia and polyuria.   Genitourinary: Negative for difficulty urinating, dysuria, frequency and urgency.   Musculoskeletal: Positive for arthralgias and gait problem (uses a cane). Negative for myalgias.   Skin: Negative for color change, rash and wound.   Allergic/Immunologic: Negative for immunocompromised state.   Neurological: Negative for dizziness, weakness, light-headedness and headaches.   Hematological: Does not bruise/bleed easily.   Psychiatric/Behavioral: Negative for dysphoric mood and sleep disturbance. The patient is not nervous/anxious.        Past Medical History:   Diagnosis Date    Cataract 1998    remove from left eye    Dementia     Depression     GERD (gastroesophageal reflux disease)     Hypertension        Past Surgical History:   Procedure Laterality Date    HYSTERECTOMY      LAPAROTOMY-EXPLORATORY N/A 11/27/2017    [facility]    RESECTION-SMALL BOWEL  11/27/2017    Per[facility] by [facility] at [facility] OR       ALLERGIES: Review of patient's allergies indicates:  No Known Allergies    MEDS:   Current Outpatient Medications:     aspirin 81 MG Chew, Take 1 tablet (81 mg total) by mouth once daily., Disp: , Rfl:     azelastine (ASTELIN) 137 mcg (0.1 %) nasal spray, 1 spray by Nasal route as needed., Disp: , Rfl:     donepezil (ARICEPT) 10 MG tablet, Take 1 tablet (10 mg total) by mouth 2 (two) times daily., Disp: 180 tablet,  "Rfl: 1    escitalopram oxalate (LEXAPRO) 20 MG tablet, Take 1 tablet (20 mg total) by mouth once daily., Disp: 90 tablet, Rfl: 1    famotidine (PEPCID) 20 MG tablet, Take 1 tablet (20 mg total) by mouth once daily., Disp: 30 tablet, Rfl: 1    loratadine (CLARITIN) 10 mg tablet, Take 1 tablet (10 mg total) by mouth once daily., Disp: 30 tablet, Rfl: 0    losartan (COZAAR) 50 MG tablet, Take 1 tablet (50 mg total) by mouth once daily., Disp: 90 tablet, Rfl: 3    memantine (NAMENDA) 5 MG Tab, Take 1 tablet (5 mg total) by mouth 2 (two) times daily., Disp: 180 tablet, Rfl: 1    multivitamin capsule, Take 1 capsule by mouth once daily., Disp: , Rfl:     pantoprazole (PROTONIX) 40 MG tablet, Take 1 tablet (40 mg total) by mouth once daily., Disp: 90 tablet, Rfl: 0    OBJECTIVE:   Vitals:    04/03/19 1319   BP: 130/72   BP Location: Left arm   Patient Position: Sitting   BP Method: Medium (Manual)   Pulse: 70   Resp: 16   Temp: 98 °F (36.7 °C)   TempSrc: Oral   SpO2: 96%   Weight: 62.2 kg (137 lb 1.6 oz)   Height: 5' 5" (1.651 m)     Body mass index is 22.81 kg/m².    Physical Exam   Constitutional: No distress.   Eyes: Conjunctivae are normal.   Neck: Neck supple. No thyromegaly present.   Cardiovascular: Normal rate, regular rhythm and intact distal pulses. Exam reveals no gallop and no friction rub.   Murmur heard.  Pulmonary/Chest: Effort normal and breath sounds normal.   Abdominal: Soft. Bowel sounds are normal. She exhibits no distension. There is no tenderness. There is no rebound.   Musculoskeletal: She exhibits no edema.   Neurological: She is alert.   Skin: Skin is warm.       Depression Patient Health Questionnaire 4/3/2019 3/20/2019 4/9/2018 12/27/2017   In the last two weeks how often have you had little interest or pleasure in doing things 0 0 0 0   In the last two weeks how often have you felt down, depressed or hopeless 0 0 0 1   PHQ-2 Total Score 0 0 0 1           PERTINENT RESULTS:   3/19/2019 "   Test Reason : R11.0,    Vent. Rate : 081 BPM     Atrial Rate : 081 BPM     P-R Int : 162 ms          QRS Dur : 118 ms      QT Int : 404 ms       P-R-T Axes : 059 -20 049 degrees     QTc Int : 469 ms    Normal sinus rhythm  LVH with QRS widening  Abnormal ECG  When compared with ECG of 12-MAR-2019 08:52,  Premature supraventricular complexes are no longer Present     ASSESSMENT:  Problem List Items Addressed This Visit        Neuro    Late onset Alzheimer's disease without behavioral disturbance    Overview     - diagnosed 2012 by Dr. Pedro Pablo Drake Psychaitry         Current Assessment & Plan     - stable and no behavioral disturbance  - continue Aricept and Namenda            Psychiatric    Depression    Overview     - diagnosed 2012 by Dr. Pedro Pablo Drake Psychaitry         Current Assessment & Plan     - well controlled  - tolerating medication  - EKG reviewed and QTc normal on SSRI and Aricept         Relevant Medications    escitalopram oxalate (LEXAPRO) 20 MG tablet       Pulmonary    Pulmonary hypertension    Overview     - 3/12/19 Echo: PA pressure 41  mmHg         Current Assessment & Plan     - asymptomatic            Cardiac/Vascular    Essential hypertension (Chronic)    Current Assessment & Plan     - well controlled  - continue current meds         Aortic stenosis, moderate - Primary (Chronic)    Overview     - 3/12/19 Echo: Mild to moderate aortic stenosis, ROBIN = 0.8 cm2, mean gradient = 17 mmHg.             Current Assessment & Plan     - stable and asymptomatic            GI    Gastroesophageal reflux disease    Current Assessment & Plan     - improved on famotidine and Pantoprazole will monitor for recurrent symptoms  - rec stop famotidine once prescription completed  - continue Pantoprazole for now         Relevant Medications    pantoprazole (PROTONIX) 40 MG tablet      Other Visit Diagnoses     MDD (major depressive disorder), single episode, in partial remission              PLAN:   Orders  Placed This Encounter    pantoprazole (PROTONIX) 40 MG tablet    escitalopram oxalate (LEXAPRO) 20 MG tablet     Follow-up in 3 months.     Dr. Queta Badillo D.O.   [facility] Medicine

## 2019-04-03 NOTE — ASSESSMENT & PLAN NOTE
- improved on famotidine and Pantoprazole will monitor for recurrent symptoms  - rec stop famotidine once prescription completed  - continue Pantoprazole for now

## 2019-04-03 NOTE — PATIENT INSTRUCTIONS
Please bring all your medications to every visit so that we can assure that you medication list is accurate

## 2019-05-17 ENCOUNTER — OFFICE VISIT (OUTPATIENT)
Dept: PSYCHIATRY | Facility: CLINIC | Age: 84
End: 2019-05-17
Payer: MEDICARE

## 2019-05-17 VITALS
WEIGHT: 138.25 LBS | HEART RATE: 77 BPM | SYSTOLIC BLOOD PRESSURE: 144 MMHG | HEIGHT: 60 IN | BODY MASS INDEX: 27.14 KG/M2 | DIASTOLIC BLOOD PRESSURE: 67 MMHG

## 2019-05-17 DIAGNOSIS — F33.42 RECURRENT MAJOR DEPRESSIVE DISORDER, IN FULL REMISSION: ICD-10-CM

## 2019-05-17 DIAGNOSIS — G30.9 ALZHEIMER'S DEMENTIA WITHOUT BEHAVIORAL DISTURBANCE, UNSPECIFIED TIMING OF DEMENTIA ONSET: ICD-10-CM

## 2019-05-17 DIAGNOSIS — F02.80 ALZHEIMER'S DEMENTIA WITHOUT BEHAVIORAL DISTURBANCE, UNSPECIFIED TIMING OF DEMENTIA ONSET: ICD-10-CM

## 2019-05-17 PROCEDURE — 99214 OFFICE O/P EST MOD 30 MIN: CPT | Mod: S$PBB,,, | Performed by: PSYCHIATRY & NEUROLOGY

## 2019-05-17 PROCEDURE — 99212 OFFICE O/P EST SF 10 MIN: CPT | Mod: PBBFAC | Performed by: PSYCHIATRY & NEUROLOGY

## 2019-05-17 PROCEDURE — 99999 PR PBB SHADOW E&M-EST. PATIENT-LVL II: ICD-10-PCS | Mod: PBBFAC,,, | Performed by: PSYCHIATRY & NEUROLOGY

## 2019-05-17 PROCEDURE — 99999 PR PBB SHADOW E&M-EST. PATIENT-LVL II: CPT | Mod: PBBFAC,,, | Performed by: PSYCHIATRY & NEUROLOGY

## 2019-05-17 PROCEDURE — 99214 PR OFFICE/OUTPT VISIT, EST, LEVL IV, 30-39 MIN: ICD-10-PCS | Mod: S$PBB,,, | Performed by: PSYCHIATRY & NEUROLOGY

## 2019-05-17 RX ORDER — DONEPEZIL HYDROCHLORIDE 23 MG/1
23 TABLET, FILM COATED ORAL DAILY
Qty: 90 TABLET | Refills: 1 | Status: SHIPPED | OUTPATIENT
Start: 2019-05-17 | End: 2022-06-27

## 2019-05-17 RX ORDER — ESCITALOPRAM OXALATE 20 MG/1
20 TABLET ORAL DAILY
Qty: 90 TABLET | Refills: 1 | Status: SHIPPED | OUTPATIENT
Start: 2019-05-17 | End: 2019-09-30 | Stop reason: SDUPTHER

## 2019-05-17 NOTE — PROGRESS NOTES
Outpatient Psychiatry Follow-Up Visit (MD/NP)    5/17/2019    Clinical Status of Patient:  Outpatient (Ambulatory)    Chief Complaint:  Lesa Holder is a 89 y.o. female who presents today for follow-up of psychosis, depression, and dementia.     Met with patient and spouse.      Interval History and Content of Current Session:  Interim Events/Subjective Report/Content of Current Session:   Patient reports that she has been doing well.  She denies problems with her mood. She denies sadness.  She denies inappropriate worry.  She denies feeling as if that others want to harm her.  She denies hallucinations.  She sleeps through the night.  She has lost some weight but she reports that she is eating well.  She enjoyed going places with her .    Her  reports that Namenda was discontinued because she was awakening in the night and falling.  She also felt sluggish during the day and was having nightmares.  It resolved after it was discontinued.  The medical record reveals that it was restarted by her primary care physician at the lower dose of 5 mg twice daily.  Thus far there have been no problems with side effects.  The patient continues to complain of problems with her memory.  She mostly describes misplacing items which she eventually finds.  Her  is unclear as to whether not she is taking 10 mg or 20 mg of Aricept.    Psychotherapy:  · Target symptoms: depression, psychosis, memory loss  · Why chosen therapy is appropriate versus another modality: relevant to diagnosis  · Outcome monitoring methods: self-report, feedback from family  · Therapeutic intervention type: supportive psychotherapy  · Topics discussed/themes: stress related to medical comorbidities, life stage transitional issues  · The patient's response to the intervention is accepting. The patient's progress toward treatment goals is good.   · Duration of intervention: 5 mins    Review of Systems   Review of Systems   Constitutional:  Positive for weight loss.   Respiratory: Positive for cough.    Cardiovascular: Negative for chest pain and palpitations.   Gastrointestinal: Positive for heartburn. Negative for abdominal pain, nausea and vomiting.   Genitourinary: Negative for dysuria and frequency.   Musculoskeletal: Positive for joint pain. Negative for falls.         Past Medical, Family and Social History: The patient's past medical, family and social history have been reviewed and updated as appropriate within the electronic medical record - see encounter notes.    Compliance:  As above    Side effects: None    Risk Parameters:  Patient reports no suicidal ideation  Patient reports no homicidal ideation  Patient reports no self-injurious behavior  Patient reports no violent behavior    Exam (detailed: at least 9 elements; comprehensive: all 15 elements)   Constitutional  Vitals:  Most recent vital signs, dated less than 90 days prior to this appointment, were not reviewed.    Vitals:    05/17/19 1017   BP: (!) 144/67   BP Location: Left arm   Patient Position: Sitting   BP Method: Large (Automatic)   Pulse: 77   Weight: 62.7 kg (138 lb 3.7 oz)   Height: 5' (1.524 m)      General:  age appropriate, neatly groomed, overweight     Musculoskeletal  Muscle Strength/Tone:  no dyskinesia, no dystonia, no tremor   Gait & Station:  uses cane     Psychiatric  Speech:  normal tone, normal rate, normal pitch, normal volume, no slurring, not pressured   Mood:   Affect:  euthymic  appropriate, bright, midline   Thought Process:  goal-directed, logical   Associations:  intact   Thought Content:  normal, no suicidality, no homicidality, delusions, or paranoia   Insight  Judgement:  good  adequate to circumstances   Orientation:  person, place, situation   Memory: intact for some recent events   Language: grossly intact   Attention Span & Concentration:  able to focus   Fund of Knowledge:  intact and appropriate to age and level of education     Assessment  and Diagnosis   Status/Progress: Based on the examination today, the patient's problem(s) is/are fairly inadequately controlled.  New problems have not been presented today.   Comorbidities are complicating management of the primary condition.  There are no active rule-out diagnoses for this patient at this time.    General Impression: the patient had no prior psychiatric history before her admission to [facility] in 2011.  During that time she was confused and depressed.  Subsequent to her discharge an attempt was made to take her off of antipsychotic medication which resulted in her developing delusions of that she was sexually arousing strange men.  Currently she is on medication regimen that includes antidepressants as well as memory preserving medications.    Diagnosis:  Hansel. 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  hyperlipidemia, hypertension, arthritis        Intervention/Counseling/Treatment Plan   · Increase Aricept to 23 mg daily  · Continue Lexapro 20 mg daily  · Continue Namenda 5 mg twice daily.        Return to Clinic: 6 months

## 2019-07-05 NOTE — PROGRESS NOTES
FAMILY MEDICINE    Patient Active Problem List   Diagnosis    Essential hypertension    Late onset Alzheimer's disease without behavioral disturbance    Depression    Gastroesophageal reflux disease    Aortic stenosis, moderate    Pulmonary hypertension    Cervical stenosis of spine       CC:   Chief Complaint   Patient presents with    Leg Pain     left    Arm Pain     left    Fatigue       SUBJECTIVE:  Lesa Holder   is a 89 y.o. female  -  with hypertension, pulmonary hypertension, dementia, GERD, and moderate aortic stenosis presents for routine follow-up and brought in by her     Since last visit she has had continue ER visit and was seen by Psychiatry Dr. Drake on 5/17/19. She was diagnosed with Alzheimer's dementia with depression and her Aricept was increased to 23 mg daily with rec to continue Namenda 5 mg BID and Lexapro 20 mg daily and to f/u in 6 months    She was also recently seen in the ER on 7/6/19 s/p fall. ER notes and imaging reviewed. 7/6/19 family noted that she slipped and fell that AM in her kitchen with + head trauma without LOC. She reportedly struck her head, left shoulder and left hip with pain in her left shoulder and hip noted in the ER and left sided neck pan with headache. No gross neurological abnormalities were noted and pt was discharge home. See CT scan head and neck below. Xray left hip and shoulder did not note any fracture or dislocation. Severe stenosis was noted on C3-4 and f/u MRI was recommended but she has a history of knee replacement    1. Hypertension    Current medication treatment:   losartan (COZAAR) 50 MG tablet, Take 1 tablet (50 mg total) by mouth once daily., Disp: 90 tablet, Rfl: 3    Medication side effects: denies    Home BP cuff: none    BP Readings from Last 5 Encounters:  07/08/19 : 118/60  07/06/19 : (!) 112/56  05/17/19 : (!) 144/67  05/11/19 : (!) 149/66  04/27/19 : (!) 148/88    2. Dementia with depression    Age diagnosed: 86    Neurology or Neuropsychiatry evaluation: yes Dr. Pedro Pablo Drake Psychaity    Current symptoms: forgetful, no wandering, sleeping well  Behavioral disturbanced: denies  Sleep: denies    Current medication regimen:   donepezil 23 mg Tab, Take 1 tablet (23 mg total) by mouth once daily., Disp: 90 tablet, Rfl: 1  escitalopram oxalate (LEXAPRO) 20 MG tablet, Take 1 tablet (20 mg total) by mouth once daily., Disp: 90 tablet, Rfl: 1    memantine (NAMENDA) 5 MG Tab, Take 1 tablet (5 mg total) by mouth 2 (two) times daily., Disp: 180 tablet, Rfl: 1  - stopped because of nightmares and agitation    Living situation: lives with   DME: cane and walker    3. GERD    Age diagnosed: this year s/p multiple ER visits  Current treatment: Pepcid 20 mg daily and Protonix 40 mg daily (completed treatment)    Symptoms: well controlled   Are symptoms controlled? yes  Prior EGD: none    4. Depression  - diagnosed 2012 by Dr. Pedro Pablo Drake Psych[facility]    Current treatment: Lexapro 20 mg daily  Side effects of current treatment: denies    Symptoms related: denies and reports well controlled  Panic attacks: denies    Hopelessness: denies  Sleep: denies  Suicidal thoughts: denies  Thoughts of self harm:denies  Thoughts of harm to others: denies  History of suicide attempts: denies  Family history of suicide:denies    Support system:  and children      ROS: Review of Systems   Constitutional: Negative for activity change, appetite change, fatigue, fever and unexpected weight change.   HENT: Negative for congestion, nosebleeds, trouble swallowing and voice change.    Eyes: Negative for photophobia and visual disturbance.   Respiratory: Negative for cough, chest tightness and shortness of breath.    Cardiovascular: Negative for chest pain, palpitations and leg swelling.   Gastrointestinal: Negative for abdominal distention, abdominal pain, blood in stool, constipation, diarrhea, nausea and vomiting.   Endocrine: Negative for  cold intolerance, heat intolerance, polydipsia, polyphagia and polyuria.   Genitourinary: Negative for difficulty urinating, dysuria, frequency and urgency.   Musculoskeletal: Positive for arthralgias, back pain (mid/upper back), gait problem (uses a cane), neck pain and neck stiffness. Negative for myalgias.   Skin: Negative for color change, rash and wound.   Allergic/Immunologic: Negative for immunocompromised state.   Neurological: Positive for weakness. Negative for dizziness, tremors, syncope, speech difficulty, light-headedness, numbness and headaches.   Hematological: Does not bruise/bleed easily.   Psychiatric/Behavioral: Positive for confusion. Negative for agitation, behavioral problems, dysphoric mood and sleep disturbance. The patient is nervous/anxious.        Past Medical History:   Diagnosis Date    Cataract 1998    remove from left eye    Dementia     Depression     GERD (gastroesophageal reflux disease)     Hypertension        Past Surgical History:   Procedure Laterality Date    HYSTERECTOMY      LAPAROTOMY-EXPLORATORY N/A 11/27/2017    Performed by [facility] at [facility]    RESECTION-SMALL BOWEL  11/27/2017    Performed by [facility] Gardner MD at [facility] OR       ALLERGIES: Review of patient's allergies indicates:  No Known Allergies    MEDS:   Current Outpatient Medications:     aspirin 81 MG Chew, Take 1 tablet (81 mg total) by mouth once daily., Disp: , Rfl:     azelastine (ASTELIN) 137 mcg (0.1 %) nasal spray, 1 spray by Nasal route as needed., Disp: , Rfl:     donepezil 23 mg Tab, Take 1 tablet (23 mg total) by mouth once daily., Disp: 90 tablet, Rfl: 1    escitalopram oxalate (LEXAPRO) 20 MG tablet, Take 1 tablet (20 mg total) by mouth once daily., Disp: 90 tablet, Rfl: 1    fluticasone (FLONASE) 50 mcg/actuation nasal spray, 1 spray (50 mcg total) by Each Nare route 2 (two) times daily as needed for Rhinitis., Disp: 15 g, Rfl: 0    loratadine (CLARITIN) 10 mg  "tablet, Take 1 tablet (10 mg total) by mouth once daily., Disp: 30 tablet, Rfl: 0    losartan (COZAAR) 50 MG tablet, Take 1 tablet (50 mg total) by mouth once daily., Disp: 90 tablet, Rfl: 3    multivitamin capsule, Take 1 capsule by mouth once daily., Disp: , Rfl:     omeprazole (PRILOSEC) 20 MG capsule, Take 1 capsule (20 mg total) by mouth once daily., Disp: 90 capsule, Rfl: 1    predniSONE (DELTASONE) 50 MG Tab, Take 1 tablet (50 mg total) by mouth once daily. for 5 days, Disp: 5 tablet, Rfl: 0    OBJECTIVE:   Vitals:    07/08/19 1404   BP: 118/60   BP Location: Left arm   Patient Position: Sitting   BP Method: Large (Manual)   Pulse: 65   Resp: 17   Temp: 98.3 °F (36.8 °C)   TempSrc: Oral   SpO2: 97%   Weight: 61.4 kg (135 lb 4.8 oz)   Height: 5' 1" (1.549 m)     Body mass index is 25.56 kg/m².    Physical Exam   Constitutional: No distress.   Eyes: Conjunctivae are normal.   Neck: Neck supple. No thyromegaly present.   Cardiovascular: Normal rate, regular rhythm and intact distal pulses. Exam reveals no gallop and no friction rub.   Murmur heard.  Pulmonary/Chest: Effort normal and breath sounds normal.   Abdominal: Soft. Bowel sounds are normal. She exhibits no distension. There is no tenderness. There is no rebound.   Musculoskeletal: She exhibits no edema.        Cervical back: She exhibits tenderness (overlying left trapezius). She exhibits normal range of motion and no swelling.   Spurling's test right: negative  Spurling's test left: positive   Neurological: She is alert. No cranial nerve deficit.   Reflex Scores:       Tricep reflexes are 2+ on the right side and 2+ on the left side.       Bicep reflexes are 2+ on the right side and 2+ on the left side.       Brachioradialis reflexes are 2+ on the right side and 2+ on the left side.       Patellar reflexes are 2+ on the right side and 2+ on the left side.       Achilles reflexes are 2+ on the right side and 2+ on the left side.  Skin: Skin is warm. "       Depression Patient Health Questionnaire 7/8/2019 4/3/2019 3/20/2019 4/9/2018 12/27/2017   Over the last two weeks how often have you been bothered by little interest or pleasure in doing things 0 0 0 0 0   Over the last two weeks how often have you been bothered by feeling down, depressed or hopeless 0 0 0 0 1   PHQ-2 Total Score 0 0 0 0 1           PERTINENT RESULTS:   3/19/2019   Test Reason : R11.0,    Vent. Rate : 081 BPM     Atrial Rate : 081 BPM     P-R Int : 162 ms          QRS Dur : 118 ms      QT Int : 404 ms       P-R-T Axes : 059 -20 049 degrees     QTc Int : 469 ms    Normal sinus rhythm  LVH with QRS widening  Abnormal ECG  When compared with ECG of 12-MAR-2019 08:52,  Premature supraventricular complexes are no longer Present     7/7/2019       Narrative     EXAMINATION:  CT HEAD WITHOUT CONTRAST    CLINICAL HISTORY:  Headache, post trauma;    TECHNIQUE:  Low dose axial CT images obtained throughout the head without intravenous contrast. Sagittal and coronal reconstructions were performed.    COMPARISON:  Head CT from 03/17/2019    FINDINGS:  Intracranial compartment:    Generalized cerebral volume loss with compensatory enlargement of the ventricles with no evidence of hydrocephalus.  No extra-axial blood or fluid collections.    Scattered hypoattenuation of the supratentorial white matter which is nonspecific but likely represents chronic microvascular ischemic changes. No parenchymal mass, hemorrhage, edema or major vascular distribution infarct.    Skull/extracranial contents (limited evaluation): No fracture. Mastoid air cells and paranasal sinuses are essentially clear.  Postsurgical changes of the globes bilaterally.      Impression       No acute abnormality.  Follow-up as clinically indicated.    The preliminary and final reports are concordant.         7/7/2019   CT CERVICAL SPINE WITHOUT CONTRAST    CLINICAL HISTORY:  C-spine trauma, NEXUS/CCR positive, +risk  factor(s);    TECHNIQUE:  Low dose axial images, sagittal and coronal reformations were performed though the cervical spine.  Contrast was not administered.    COMPARISON:  CT cervical spine from 09/12/2017    FINDINGS:  Visualized intracranial structures are normal in appearance.  Visualized paranasal sinuses and mastoid air cells are clear.  Normal thyroid.  Soft tissue structures of the neck are normal in appearance.  No evidence of lymphadenopathy.  Moderate calcific atherosclerosis of the carotid bulbs bilaterally.  Visualized portion of the right lung apex is normal in appearance.    Vertebral body heights are satisfactorily maintained.  Multilevel intervertebral disc space narrowing.  Grade 1 anterolisthesis of C4 on C5.  Multilevel degenerative changes with severe facet arthropathy throughout the cervical spine with associated severe central spinal canal stenosis at C3-4 and moderate to severe central spinal canal narrowing at multiple additional levels.  Multilevel bilateral neural foraminal narrowing.  No evidence of acute fracture or dislocation.      Impression       No evidence of acute fracture.    Severe degenerative changes of the cervical spine with severe central spinal canal stenosis and neural foraminal narrowing at multiple levels.    The preliminary and final reports are concordant.         ASSESSMENT:  Problem List Items Addressed This Visit        Neuro    Late onset Alzheimer's disease without behavioral disturbance    Overview     - diagnosed 2012 by Dr. Pedro Pablo Drake Psychaitry         Current Assessment & Plan     - stable         Cervical stenosis of spine    Overview     - 7/6/19 CT C-spine: multi-level disc narrowing. Grade 1 anteriolisthesis C4 on 5. Severe facet arthropathy throughout the C-spine with central canal stenosis at C3-4 and moderate to severe central canal narrowing.          Current Assessment & Plan     - with +Spurling left  - rec Prednisone 50 mg daily x 5 days  -  counseling regarding new medications including expected results, potential side effects, and appropriate use. Questions elicited and answered  - referral to Ortho spine and pt has seen Dr. Vargas in the past  - has knee arthrosis so cannot get MRI. Defer to Ortho spine for imaging         Relevant Medications    predniSONE (DELTASONE) 50 MG Tab    Other Relevant Orders    Ambulatory Referral to Physical/Occupational Therapy    Ambulatory referral to Orthopedics       Psychiatric    Depression    Overview     - diagnosed 2012 by Dr. Pedro Pablo Drake Psychaitry         Current Assessment & Plan     - well controlled  - continue current meds            Cardiac/Vascular    Essential hypertension - Primary (Chronic)    Current Assessment & Plan     - well controlled  - continue current meds         Aortic stenosis, moderate (Chronic)    Overview     - 3/12/19 Echo: Mild to moderate aortic stenosis, ROBIN = 0.8 cm2, mean gradient = 17 mmHg.             Current Assessment & Plan     - asymptomatic            GI    Gastroesophageal reflux disease    Current Assessment & Plan     - decrease regimen to Omeprazole 20 mg daily  - monitor         Relevant Medications    omeprazole (PRILOSEC) 20 MG capsule          PLAN:   Orders Placed This Encounter    Ambulatory Referral to Physical/Occupational Therapy    Ambulatory referral to Orthopedics    predniSONE (DELTASONE) 50 MG Tab    omeprazole (PRILOSEC) 20 MG capsule     Follow-up in 2 weeks    Dr. Queta Badillo D.O.   Family Medicine

## 2019-07-08 ENCOUNTER — OFFICE VISIT (OUTPATIENT)
Dept: FAMILY MEDICINE | Facility: CLINIC | Age: 84
End: 2019-07-08
Payer: MEDICARE

## 2019-07-08 VITALS
DIASTOLIC BLOOD PRESSURE: 60 MMHG | TEMPERATURE: 98 F | HEART RATE: 65 BPM | BODY MASS INDEX: 25.55 KG/M2 | SYSTOLIC BLOOD PRESSURE: 118 MMHG | OXYGEN SATURATION: 97 % | RESPIRATION RATE: 17 BRPM | WEIGHT: 135.31 LBS | HEIGHT: 61 IN

## 2019-07-08 DIAGNOSIS — M48.02 CERVICAL STENOSIS OF SPINE: ICD-10-CM

## 2019-07-08 DIAGNOSIS — I35.0 AORTIC STENOSIS, MODERATE: Chronic | ICD-10-CM

## 2019-07-08 DIAGNOSIS — F02.80 LATE ONSET ALZHEIMER'S DISEASE WITHOUT BEHAVIORAL DISTURBANCE: ICD-10-CM

## 2019-07-08 DIAGNOSIS — F33.42 RECURRENT MAJOR DEPRESSIVE DISORDER, IN FULL REMISSION: ICD-10-CM

## 2019-07-08 DIAGNOSIS — G30.1 LATE ONSET ALZHEIMER'S DISEASE WITHOUT BEHAVIORAL DISTURBANCE: ICD-10-CM

## 2019-07-08 DIAGNOSIS — I10 ESSENTIAL HYPERTENSION: Primary | Chronic | ICD-10-CM

## 2019-07-08 DIAGNOSIS — K21.9 GASTROESOPHAGEAL REFLUX DISEASE WITHOUT ESOPHAGITIS: ICD-10-CM

## 2019-07-08 PROCEDURE — 99214 OFFICE O/P EST MOD 30 MIN: CPT | Mod: S$PBB,,, | Performed by: FAMILY MEDICINE

## 2019-07-08 PROCEDURE — 99999 PR PBB SHADOW E&M-EST. PATIENT-LVL V: ICD-10-PCS | Mod: PBBFAC,,, | Performed by: FAMILY MEDICINE

## 2019-07-08 PROCEDURE — 99999 PR PBB SHADOW E&M-EST. PATIENT-LVL V: CPT | Mod: PBBFAC,,, | Performed by: FAMILY MEDICINE

## 2019-07-08 PROCEDURE — 99214 PR OFFICE/OUTPT VISIT, EST, LEVL IV, 30-39 MIN: ICD-10-PCS | Mod: S$PBB,,, | Performed by: FAMILY MEDICINE

## 2019-07-08 PROCEDURE — 99215 OFFICE O/P EST HI 40 MIN: CPT | Mod: PBBFAC,PN | Performed by: FAMILY MEDICINE

## 2019-07-08 RX ORDER — OMEPRAZOLE 20 MG/1
20 CAPSULE, DELAYED RELEASE ORAL DAILY
Qty: 90 CAPSULE | Refills: 1 | Status: SHIPPED | OUTPATIENT
Start: 2019-07-08 | End: 2019-08-19 | Stop reason: SDUPTHER

## 2019-07-08 RX ORDER — PREDNISONE 50 MG/1
50 TABLET ORAL DAILY
Qty: 5 TABLET | Refills: 0 | Status: SHIPPED | OUTPATIENT
Start: 2019-07-08 | End: 2019-07-13

## 2019-07-08 NOTE — ASSESSMENT & PLAN NOTE
- with +Spurling left  - rec Prednisone 50 mg daily x 5 days  - counseling regarding new medications including expected results, potential side effects, and appropriate use. Questions elicited and answered  - referral to Ortho spine and pt has seen Dr. Vargas in the past  - has knee arthrosis so cannot get MRI. Defer to Ortho spine for imaging

## 2019-07-12 ENCOUNTER — TELEPHONE (OUTPATIENT)
Dept: FAMILY MEDICINE | Facility: CLINIC | Age: 84
End: 2019-07-12

## 2019-07-12 NOTE — TELEPHONE ENCOUNTER
----- Message from Queta Badillo DO sent at 7/8/2019  2:30 PM CDT -----  Fax referral with note to [facility]

## 2019-07-19 NOTE — PROGRESS NOTES
"FAMILY MEDICINE    Patient Active Problem List   Diagnosis    Essential hypertension    Late onset Alzheimer's disease without behavioral disturbance    Depression    Gastroesophageal reflux disease    Aortic stenosis, moderate    Pulmonary hypertension    Cervical stenosis of spine       CC:   Chief Complaint   Patient presents with    Follow-up     2 week follow up        SUBJECTIVE:  Lesa Holder   is a 89 y.o. female  -  with hypertension, pulmonary hypertension, dementia, GERD, and moderate aortic stenosis presents for routine follow-up. Son brought her today but not present at the visit.     Last note: "Since last visit she has had continue ER visit and was seen by Psychiatry Dr. Drake on 5/17/19. She was diagnosed with Alzheimer's dementia with depression and her Aricept was increased to 23 mg daily with rec to continue Namenda 5 mg BID and Lexapro 20 mg daily and to f/u in 6 months  She was also recently seen in the ER on 7/6/19 s/p fall. ER notes and imaging reviewed. 7/6/19 family noted that she slipped and fell that AM in her kitchen with + head trauma without LOC. She reportedly struck her head, left shoulder and left hip with pain in her left shoulder and hip noted in the ER and left sided neck pan with headache. No gross neurological abnormalities were noted and pt was discharge home. See CT scan head and neck below. Xray left hip and shoulder did not note any fracture or dislocation. Severe stenosis was noted on C3-4 and f/u MRI was recommended but she has a history of knee replacement"    Today she reports that she did make an appt with Dr. Vargas but that she is feeling much better. No pain s/p steroids. No complaints today. Also reports Omeprazole is working well and stomach issues are well controlled    1. GERD    Age diagnosed: this year s/p multiple ER visits  Current medication:   omeprazole (PRILOSEC) 20 MG capsule, Take 1 capsule (20 mg total) by mouth once daily., Disp: 90 " capsule, Rfl: 1    Symptoms: well controlled   Are symptoms controlled? yes  Prior EGD: none      ROS: Review of Systems   Constitutional: Negative for activity change, appetite change, fatigue, fever and unexpected weight change.   Eyes: Negative for visual disturbance.   Respiratory: Negative for cough, chest tightness and shortness of breath.    Cardiovascular: Negative for chest pain, palpitations and leg swelling.   Gastrointestinal: Negative for abdominal pain, blood in stool, constipation, diarrhea, nausea and vomiting.   Genitourinary: Negative for difficulty urinating, dysuria, frequency and urgency.   Musculoskeletal: Positive for arthralgias. Negative for back pain (mid/upper back), gait problem (uses a cane), myalgias, neck pain and neck stiffness.   Skin: Negative for color change, rash and wound.   Neurological: Negative for dizziness, tremors, syncope, speech difficulty, weakness, light-headedness, numbness and headaches.   Psychiatric/Behavioral: Positive for confusion. Negative for dysphoric mood and sleep disturbance. The patient is not nervous/anxious.        Past Medical History:   Diagnosis Date    Cataract 1998    remove from left eye    Dementia     Depression     GERD (gastroesophageal reflux disease)     Hypertension        Past Surgical History:   Procedure Laterality Date    HYSTERECTOMY      LAPAROTOMY-EXPLORATORY N/A 11/27/2017    Performed by Barney Gardner MD at [facility]    RESECTION-SMALL BOWEL  11/27/2017    Performed by Barney Gardner MD at [facility] OR       ALLERGIES: Review of patient's allergies indicates:  No Known Allergies    MEDS:   Current Outpatient Medications:     aspirin 81 MG Chew, Take 1 tablet (81 mg total) by mouth once daily., Disp: , Rfl:     azelastine (ASTELIN) 137 mcg (0.1 %) nasal spray, 1 spray by Nasal route as needed., Disp: , Rfl:     donepezil 23 mg Tab, Take 1 tablet (23 mg total) by mouth once daily., Disp: 90 tablet, Rfl: 1     "escitalopram oxalate (LEXAPRO) 20 MG tablet, Take 1 tablet (20 mg total) by mouth once daily., Disp: 90 tablet, Rfl: 1    fluticasone (FLONASE) 50 mcg/actuation nasal spray, 1 spray (50 mcg total) by Each Nare route 2 (two) times daily as needed for Rhinitis., Disp: 15 g, Rfl: 0    loratadine (CLARITIN) 10 mg tablet, Take 1 tablet (10 mg total) by mouth once daily., Disp: 30 tablet, Rfl: 0    losartan (COZAAR) 50 MG tablet, Take 1 tablet (50 mg total) by mouth once daily., Disp: 90 tablet, Rfl: 3    multivitamin capsule, Take 1 capsule by mouth once daily., Disp: , Rfl:     omeprazole (PRILOSEC) 20 MG capsule, Take 1 capsule (20 mg total) by mouth once daily., Disp: 90 capsule, Rfl: 1    OBJECTIVE:   Vitals:    07/24/19 1340   BP: 110/62   BP Location: Left arm   Patient Position: Sitting   BP Method: X-Large (Manual)   Pulse: 70   Resp: 17   Temp: 98.3 °F (36.8 °C)   TempSrc: Oral   SpO2: 99%   Weight: 62.7 kg (138 lb 4.8 oz)   Height: 5' 1" (1.549 m)     Body mass index is 26.13 kg/m².    Physical Exam   Constitutional: No distress.   Neck: Neck supple.   Cardiovascular: Normal rate, regular rhythm and intact distal pulses. Exam reveals no gallop and no friction rub.   Murmur heard.  Pulmonary/Chest: Effort normal and breath sounds normal.   Musculoskeletal: She exhibits no edema.   Neurological: She is alert.       Depression Patient Health Questionnaire 7/24/2019 7/8/2019 4/3/2019 3/20/2019 4/9/2018 12/27/2017   Over the last two weeks how often have you been bothered by little interest or pleasure in doing things 0 0 0 0 0 0   Over the last two weeks how often have you been bothered by feeling down, depressed or hopeless 0 0 0 0 0 1   PHQ-2 Total Score 0 0 0 0 0 1           PERTINENT RESULTS:   3/19/2019   Test Reason : R11.0,    Vent. Rate : 081 BPM     Atrial Rate : 081 BPM     P-R Int : 162 ms          QRS Dur : 118 ms      QT Int : 404 ms       P-R-T Axes : 059 -20 049 degrees     QTc Int : 469 " ms    Normal sinus rhythm  LVH with QRS widening  Abnormal ECG  When compared with ECG of 12-MAR-2019 08:52,  Premature supraventricular complexes are no longer Present     7/7/2019       Narrative     EXAMINATION:  CT HEAD WITHOUT CONTRAST    CLINICAL HISTORY:  Headache, post trauma;    TECHNIQUE:  Low dose axial CT images obtained throughout the head without intravenous contrast. Sagittal and coronal reconstructions were performed.    COMPARISON:  Head CT from 03/17/2019    FINDINGS:  Intracranial compartment:    Generalized cerebral volume loss with compensatory enlargement of the ventricles with no evidence of hydrocephalus.  No extra-axial blood or fluid collections.    Scattered hypoattenuation of the supratentorial white matter which is nonspecific but likely represents chronic microvascular ischemic changes. No parenchymal mass, hemorrhage, edema or major vascular distribution infarct.    Skull/extracranial contents (limited evaluation): No fracture. Mastoid air cells and paranasal sinuses are essentially clear.  Postsurgical changes of the globes bilaterally.      Impression       No acute abnormality.  Follow-up as clinically indicated.    The preliminary and final reports are concordant.         7/7/2019   CT CERVICAL SPINE WITHOUT CONTRAST    CLINICAL HISTORY:  C-spine trauma, NEXUS/CCR positive, +risk factor(s);    TECHNIQUE:  Low dose axial images, sagittal and coronal reformations were performed though the cervical spine.  Contrast was not administered.    COMPARISON:  CT cervical spine from 09/12/2017    FINDINGS:  Visualized intracranial structures are normal in appearance.  Visualized paranasal sinuses and mastoid air cells are clear.  Normal thyroid.  Soft tissue structures of the neck are normal in appearance.  No evidence of lymphadenopathy.  Moderate calcific atherosclerosis of the carotid bulbs bilaterally.  Visualized portion of the right lung apex is normal in appearance.    Vertebral body  heights are satisfactorily maintained.  Multilevel intervertebral disc space narrowing.  Grade 1 anterolisthesis of C4 on C5.  Multilevel degenerative changes with severe facet arthropathy throughout the cervical spine with associated severe central spinal canal stenosis at C3-4 and moderate to severe central spinal canal narrowing at multiple additional levels.  Multilevel bilateral neural foraminal narrowing.  No evidence of acute fracture or dislocation.      Impression       No evidence of acute fracture.    Severe degenerative changes of the cervical spine with severe central spinal canal stenosis and neural foraminal narrowing at multiple levels.    The preliminary and final reports are concordant.         ASSESSMENT/PLAN:  Problem List Items Addressed This Visit        Neuro    Cervical stenosis of spine    Overview     - 7/6/19 CT C-spine: multi-level disc narrowing. Grade 1 anteriolisthesis C4 on 5. Severe facet arthropathy throughout the C-spine with central canal stenosis at C3-4 and moderate to severe central canal narrowing.          Current Assessment & Plan     - recommend follow-up with Ortho spine  - has knee arthrosis so cannot get MRI. Defer to Ortho spine for imaging            Cardiac/Vascular    Essential hypertension (Chronic)    Current Assessment & Plan     - well controlled  - continue current meds            GI    Gastroesophageal reflux disease - Primary    Current Assessment & Plan     - well controlled  - continue current meds                Follow-up in 4 months    Dr. Queta Badillo D.O.   Family Medicine

## 2019-07-24 ENCOUNTER — OFFICE VISIT (OUTPATIENT)
Dept: FAMILY MEDICINE | Facility: CLINIC | Age: 84
End: 2019-07-24
Payer: MEDICARE

## 2019-07-24 VITALS
WEIGHT: 138.31 LBS | DIASTOLIC BLOOD PRESSURE: 62 MMHG | RESPIRATION RATE: 17 BRPM | HEIGHT: 61 IN | SYSTOLIC BLOOD PRESSURE: 110 MMHG | OXYGEN SATURATION: 99 % | HEART RATE: 70 BPM | TEMPERATURE: 98 F | BODY MASS INDEX: 26.11 KG/M2

## 2019-07-24 DIAGNOSIS — M48.02 CERVICAL STENOSIS OF SPINE: ICD-10-CM

## 2019-07-24 DIAGNOSIS — K21.9 GASTROESOPHAGEAL REFLUX DISEASE WITHOUT ESOPHAGITIS: Primary | ICD-10-CM

## 2019-07-24 DIAGNOSIS — I10 ESSENTIAL HYPERTENSION: Chronic | ICD-10-CM

## 2019-07-24 PROCEDURE — 99213 PR OFFICE/OUTPT VISIT, EST, LEVL III, 20-29 MIN: ICD-10-PCS | Mod: S$PBB,,, | Performed by: FAMILY MEDICINE

## 2019-07-24 PROCEDURE — 99999 PR PBB SHADOW E&M-EST. PATIENT-LVL IV: ICD-10-PCS | Mod: PBBFAC,,, | Performed by: FAMILY MEDICINE

## 2019-07-24 PROCEDURE — 99214 OFFICE O/P EST MOD 30 MIN: CPT | Mod: PBBFAC,PN | Performed by: FAMILY MEDICINE

## 2019-07-24 PROCEDURE — 99999 PR PBB SHADOW E&M-EST. PATIENT-LVL IV: CPT | Mod: PBBFAC,,, | Performed by: FAMILY MEDICINE

## 2019-07-24 PROCEDURE — 99213 OFFICE O/P EST LOW 20 MIN: CPT | Mod: S$PBB,,, | Performed by: FAMILY MEDICINE

## 2019-08-20 ENCOUNTER — PES CALL (OUTPATIENT)
Dept: ADMINISTRATIVE | Facility: CLINIC | Age: 84
End: 2019-08-20

## 2019-09-30 DIAGNOSIS — F33.42 RECURRENT MAJOR DEPRESSIVE DISORDER, IN FULL REMISSION: ICD-10-CM

## 2019-09-30 RX ORDER — ESCITALOPRAM OXALATE 20 MG/1
TABLET ORAL
Qty: 90 TABLET | Refills: 0 | Status: SHIPPED | OUTPATIENT
Start: 2019-09-30 | End: 2022-06-27

## 2019-11-21 NOTE — PROGRESS NOTES
FAMILY MEDICINE    Patient Active Problem List   Diagnosis    Essential hypertension    Late onset Alzheimer's disease without behavioral disturbance    Depression    Gastroesophageal reflux disease    Aortic stenosis, moderate    Pulmonary hypertension    Cervical stenosis of spine       CC:   Chief Complaint   Patient presents with    Follow-up     4 months F/U       SUBJECTIVE:  Lesa Holder   is a 89 y.o. female  - with hypertension, pulmonary hypertension, dementia, GERD, depression and moderate aortic stenosis presents for routine follow-up    Today she is here with her     1. Hypertension     Current medication treatment:   losartan (COZAAR) 50 MG tablet, Take 1 tablet (50 mg total) by mouth once daily., Disp: 90 tablet, Rfl: 3    Medication side effects: denies     Home BP cuff: none     BP Readings from Last 5 Encounters:  11/25/19 : 124/64  09/09/19 : 134/72  08/19/19 : (!) 144/70  08/08/19 : (!) 154/81  07/24/19 : 110/62     2. Dementia with depression     Age diagnosed: 85 yo  Neurology or Neuropsychiatry evaluation: yes Dr. Pedro Pablo Drake Psychaitry     Current symptoms: forgetful, no wandering, sleeping well  Behavioral disturbanced: denies  Sleep: denies     Current medication regimen:    donepezil 23 mg Tab, Take 1 tablet (23 mg total) by mouth once daily., Disp: 90 tablet, Rfl: 1  escitalopram oxalate (LEXAPRO) 20 MG tablet, TAKE 1 TABLET(20 MG) BY MOUTH EVERY DAY, Disp: 90 tablet, Rfl: 0    Prior medication  memantine (NAMENDA) 5 MG Tab, Take 1 tablet (5 mg total) by mouth 2 (two) times daily., Disp: 180 tablet, Rfl: 1  - stopped because of nightmares and agitation     Living situation: lives with   DME: cane and walker      ROS: Review of Systems   Constitutional: Negative.    HENT: Negative.    Eyes: Negative.    Respiratory: Negative.    Cardiovascular: Negative.    Gastrointestinal: Negative.    Endocrine: Negative.    Genitourinary: Negative.    Musculoskeletal:  Negative.    Skin: Negative.    Allergic/Immunologic: Negative.    Neurological: Negative.    Hematological: Negative.    Psychiatric/Behavioral: Negative for dysphoric mood and sleep disturbance. The patient is not nervous/anxious.        Past Medical History:   Diagnosis Date    Cataract 1998    remove from left eye    Dementia     Depression     GERD (gastroesophageal reflux disease)     Hypertension        Past Surgical History:   Procedure Laterality Date    HYSTERECTOMY         Family History   Problem Relation Age of Onset    Cancer Daughter         Lung    Heart disease Neg Hx        Social History     Tobacco Use    Smoking status: Never Smoker    Smokeless tobacco: Never Used   Substance Use Topics    Alcohol use: No    Drug use: No       Social History     Social History Narrative    Lives her . 4 adult children (1 daughter passed, 2 sons passed, 1 son living [place]). 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 Attends [employer]. Denies alcohol, smoking or illicit drugs.        ALLERGIES: Review of patient's allergies indicates:  No Known Allergies    MEDS:   Current Outpatient Medications:     aspirin 81 MG Chew, Take 1 tablet (81 mg total) by mouth once daily., Disp: , Rfl:     azelastine (ASTELIN) 137 mcg (0.1 %) nasal spray, 1 spray by Nasal route as needed., Disp: , Rfl:     donepezil 23 mg Tab, Take 1 tablet (23 mg total) by mouth once daily., Disp: 90 tablet, Rfl: 1    escitalopram oxalate (LEXAPRO) 20 MG tablet, TAKE 1 TABLET(20 MG) BY MOUTH EVERY DAY, Disp: 90 tablet, Rfl: 0    fluticasone (FLONASE) 50 mcg/actuation nasal spray, 1 spray (50 mcg total) by Each Nare route 2 (two) times daily as needed for Rhinitis., Disp: 15 g, Rfl: 0    ketorolac (TORADOL) 10 mg tablet, , Disp: , Rfl: 1    loratadine (CLARITIN) 10 mg tablet, Take 1 tablet (10 mg total) by mouth daily as needed for Allergies., Disp: 30 tablet, Rfl: 0    multivitamin capsule, Take 1 capsule by  "mouth once daily., Disp: , Rfl:     omeprazole (PRILOSEC) 20 MG capsule, Take 1 capsule (20 mg total) by mouth once daily., Disp: 90 capsule, Rfl: 1    losartan (COZAAR) 50 MG tablet, Take 1 tablet (50 mg total) by mouth once daily., Disp: 90 tablet, Rfl: 1    OBJECTIVE:   Vitals:    11/25/19 1437   BP: 124/64   BP Location: Left arm   Patient Position: Sitting   BP Method: Large (Manual)   Pulse: 74   Resp: 16   Temp: 98.2 °F (36.8 °C)   TempSrc: Oral   SpO2: 98%   Weight: 60.9 kg (134 lb 4.8 oz)   Height: 5' 3" (1.6 m)     Body mass index is 23.79 kg/m².    Physical Exam   Constitutional: No distress.   HENT:   Head: Normocephalic and atraumatic.   Right Ear: Tympanic membrane and ear canal normal.   Left Ear: Tympanic membrane and ear canal normal.   Nose: Rhinorrhea present. No mucosal edema. Right sinus exhibits no maxillary sinus tenderness and no frontal sinus tenderness. Left sinus exhibits no maxillary sinus tenderness and no frontal sinus tenderness.   Mouth/Throat: Uvula is midline, oropharynx is clear and moist and mucous membranes are normal.   Neck: Neck supple.   Cardiovascular: Normal rate, regular rhythm and intact distal pulses. Exam reveals no gallop and no friction rub.   Murmur heard.   Systolic murmur is present with a grade of 3/6.  Pulmonary/Chest: Effort normal and breath sounds normal. She has no decreased breath sounds. She has no wheezes. She has no rhonchi. She has no rales.   Abdominal: Soft. Normal appearance and bowel sounds are normal. There is no hepatomegaly. There is no tenderness.   Musculoskeletal: She exhibits no edema.   Neurological: She is alert.         PERTINENT RESULTS:   [facility]  Lab Results   Component Value Date     09/09/2019    K 3.8 09/09/2019     09/09/2019    CO2 27 09/09/2019    BUN 15 09/09/2019    CREATININE 0.89 09/09/2019    CALCIUM 9.4 09/09/2019    ANIONGAP 7 (L) 09/09/2019    ESTGFRAFRICA >60.0 09/09/2019    EGFRNONAA 56.8 (A) 09/09/2019     Lab " Results   Component Value Date    ALT 17 09/09/2019    AST 26 09/09/2019    ALKPHOS 86 09/09/2019    BILITOT 0.5 09/09/2019     Lab Results   Component Value Date    CHOL 229 (H) 03/28/2013     Lab Results   Component Value Date    HDL 66 03/28/2013     Lab Results   Component Value Date    LDLCALC 148.0 03/28/2013     Lab Results   Component Value Date    TRIG 77 03/28/2013     Lab Results   Component Value Date    CHOLHDL 28.8 03/28/2013       ASSESSMENT/PLAN:  Problem List Items Addressed This Visit        Neuro    Late onset Alzheimer's disease without behavioral disturbance - Primary    Overview     - diagnosed 2012 by Dr. Pedro Pablo Drake Psychaitry         Current Assessment & Plan     - stable  - good family support            Cardiac/Vascular    Essential hypertension (Chronic)    Current Assessment & Plan     - well controlled  - continue current medications         Relevant Medications    losartan (COZAAR) 50 MG tablet    Other Relevant Orders    Basic metabolic panel    Aortic stenosis, moderate (Chronic)    Overview     - 3/12/19 Echo: Mild to moderate aortic stenosis, ROBIN = 0.8 cm2, mean gradient = 17 mmHg.             Current Assessment & Plan     - asymptomatic  - declines surgical evaluation and due to age likely not a candidate               ORDERS:   Orders Placed This Encounter    Influenza - High Dose (65+) (PF) (IM)    Basic metabolic panel    losartan (COZAAR) 50 MG tablet     Vaccines recommended: high dose flu    Follow-up in 3-4 months with labs prior     Dr. Queta Badillo D.O.   Family Medicine

## 2019-11-25 ENCOUNTER — OFFICE VISIT (OUTPATIENT)
Dept: FAMILY MEDICINE | Facility: CLINIC | Age: 84
End: 2019-11-25
Payer: MEDICARE

## 2019-11-25 VITALS
SYSTOLIC BLOOD PRESSURE: 124 MMHG | HEART RATE: 74 BPM | OXYGEN SATURATION: 98 % | HEIGHT: 63 IN | BODY MASS INDEX: 23.8 KG/M2 | DIASTOLIC BLOOD PRESSURE: 64 MMHG | RESPIRATION RATE: 16 BRPM | WEIGHT: 134.31 LBS | TEMPERATURE: 98 F

## 2019-11-25 DIAGNOSIS — F02.80 LATE ONSET ALZHEIMER'S DISEASE WITHOUT BEHAVIORAL DISTURBANCE: Primary | ICD-10-CM

## 2019-11-25 DIAGNOSIS — G30.1 LATE ONSET ALZHEIMER'S DISEASE WITHOUT BEHAVIORAL DISTURBANCE: Primary | ICD-10-CM

## 2019-11-25 DIAGNOSIS — I35.0 AORTIC STENOSIS, MODERATE: Chronic | ICD-10-CM

## 2019-11-25 DIAGNOSIS — I10 ESSENTIAL HYPERTENSION: Chronic | ICD-10-CM

## 2019-11-25 PROCEDURE — 1126F PR PAIN SEVERITY QUANTIFIED, NO PAIN PRESENT: ICD-10-PCS | Mod: ,,, | Performed by: FAMILY MEDICINE

## 2019-11-25 PROCEDURE — 1159F PR MEDICATION LIST DOCUMENTED IN MEDICAL RECORD: ICD-10-PCS | Mod: ,,, | Performed by: FAMILY MEDICINE

## 2019-11-25 PROCEDURE — 99214 OFFICE O/P EST MOD 30 MIN: CPT | Mod: PBBFAC,PN,25 | Performed by: FAMILY MEDICINE

## 2019-11-25 PROCEDURE — 99214 PR OFFICE/OUTPT VISIT, EST, LEVL IV, 30-39 MIN: ICD-10-PCS | Mod: S$PBB,,, | Performed by: FAMILY MEDICINE

## 2019-11-25 PROCEDURE — 90662 IIV NO PRSV INCREASED AG IM: CPT | Mod: PBBFAC,PN

## 2019-11-25 PROCEDURE — 99999 PR PBB SHADOW E&M-EST. PATIENT-LVL IV: ICD-10-PCS | Mod: PBBFAC,,, | Performed by: FAMILY MEDICINE

## 2019-11-25 PROCEDURE — 1159F MED LIST DOCD IN RCRD: CPT | Mod: ,,, | Performed by: FAMILY MEDICINE

## 2019-11-25 PROCEDURE — 99214 OFFICE O/P EST MOD 30 MIN: CPT | Mod: S$PBB,,, | Performed by: FAMILY MEDICINE

## 2019-11-25 PROCEDURE — 99999 PR PBB SHADOW E&M-EST. PATIENT-LVL IV: CPT | Mod: PBBFAC,,, | Performed by: FAMILY MEDICINE

## 2019-11-25 PROCEDURE — 1126F AMNT PAIN NOTED NONE PRSNT: CPT | Mod: ,,, | Performed by: FAMILY MEDICINE

## 2019-11-25 RX ORDER — KETOROLAC TROMETHAMINE 10 MG/1
TABLET, FILM COATED ORAL
Refills: 1 | COMMUNITY
Start: 2019-09-03 | End: 2020-03-23

## 2019-11-25 RX ORDER — LOSARTAN POTASSIUM 50 MG/1
50 TABLET ORAL DAILY
Qty: 90 TABLET | Refills: 1 | Status: SHIPPED | OUTPATIENT
Start: 2019-11-25 | End: 2020-03-23 | Stop reason: SDUPTHER

## 2020-01-31 DIAGNOSIS — K21.9 GASTROESOPHAGEAL REFLUX DISEASE WITHOUT ESOPHAGITIS: ICD-10-CM

## 2020-01-31 RX ORDER — OMEPRAZOLE 20 MG/1
CAPSULE, DELAYED RELEASE ORAL
Qty: 90 CAPSULE | Refills: 1 | Status: SHIPPED | OUTPATIENT
Start: 2020-01-31 | End: 2020-09-23

## 2020-02-01 DIAGNOSIS — F02.80 ALZHEIMER'S DEMENTIA WITHOUT BEHAVIORAL DISTURBANCE, UNSPECIFIED TIMING OF DEMENTIA ONSET: ICD-10-CM

## 2020-02-01 DIAGNOSIS — G30.9 ALZHEIMER'S DEMENTIA WITHOUT BEHAVIORAL DISTURBANCE, UNSPECIFIED TIMING OF DEMENTIA ONSET: ICD-10-CM

## 2020-02-03 RX ORDER — DONEPEZIL HYDROCHLORIDE 10 MG/1
TABLET, FILM COATED ORAL
Qty: 180 TABLET | Refills: 1 | Status: SHIPPED | OUTPATIENT
Start: 2020-02-03 | End: 2022-03-07

## 2020-03-23 ENCOUNTER — OFFICE VISIT (OUTPATIENT)
Dept: FAMILY MEDICINE | Facility: CLINIC | Age: 85
End: 2020-03-23
Payer: MEDICARE

## 2020-03-23 VITALS
BODY MASS INDEX: 25.02 KG/M2 | WEIGHT: 132.5 LBS | TEMPERATURE: 98 F | OXYGEN SATURATION: 99 % | HEIGHT: 61 IN | DIASTOLIC BLOOD PRESSURE: 60 MMHG | HEART RATE: 64 BPM | RESPIRATION RATE: 18 BRPM | SYSTOLIC BLOOD PRESSURE: 120 MMHG

## 2020-03-23 DIAGNOSIS — I35.0 AORTIC STENOSIS, MODERATE: Chronic | ICD-10-CM

## 2020-03-23 DIAGNOSIS — K21.9 GASTROESOPHAGEAL REFLUX DISEASE WITHOUT ESOPHAGITIS: Primary | ICD-10-CM

## 2020-03-23 DIAGNOSIS — F02.80 LATE ONSET ALZHEIMER'S DISEASE WITHOUT BEHAVIORAL DISTURBANCE: ICD-10-CM

## 2020-03-23 DIAGNOSIS — G30.1 LATE ONSET ALZHEIMER'S DISEASE WITHOUT BEHAVIORAL DISTURBANCE: ICD-10-CM

## 2020-03-23 DIAGNOSIS — I10 ESSENTIAL HYPERTENSION: Chronic | ICD-10-CM

## 2020-03-23 DIAGNOSIS — I27.20 PULMONARY HYPERTENSION: ICD-10-CM

## 2020-03-23 DIAGNOSIS — F33.42 RECURRENT MAJOR DEPRESSIVE DISORDER, IN FULL REMISSION: ICD-10-CM

## 2020-03-23 PROCEDURE — 99214 OFFICE O/P EST MOD 30 MIN: CPT | Mod: S$PBB,,, | Performed by: FAMILY MEDICINE

## 2020-03-23 PROCEDURE — 99999 PR PBB SHADOW E&M-EST. PATIENT-LVL IV: CPT | Mod: PBBFAC,,, | Performed by: FAMILY MEDICINE

## 2020-03-23 PROCEDURE — 99999 PR PBB SHADOW E&M-EST. PATIENT-LVL IV: ICD-10-PCS | Mod: PBBFAC,,, | Performed by: FAMILY MEDICINE

## 2020-03-23 PROCEDURE — 99214 PR OFFICE/OUTPT VISIT, EST, LEVL IV, 30-39 MIN: ICD-10-PCS | Mod: S$PBB,,, | Performed by: FAMILY MEDICINE

## 2020-03-23 PROCEDURE — 99214 OFFICE O/P EST MOD 30 MIN: CPT | Mod: PBBFAC,PN | Performed by: FAMILY MEDICINE

## 2020-03-23 RX ORDER — LOSARTAN POTASSIUM 50 MG/1
50 TABLET ORAL DAILY
Qty: 90 TABLET | Refills: 3 | Status: SHIPPED | OUTPATIENT
Start: 2020-03-23 | End: 2021-03-18 | Stop reason: SDUPTHER

## 2020-03-23 NOTE — PROGRESS NOTES
FAMILY MEDICINE    Patient Active Problem List   Diagnosis    Essential hypertension    Late onset Alzheimer's disease without behavioral disturbance    Depression    Gastroesophageal reflux disease    Aortic stenosis, moderate    Pulmonary hypertension    Cervical stenosis of spine       CC:   Chief Complaint   Patient presents with    Follow-up       SUBJECTIVE:  Lesa Holder   is a 89 y.o. female  - with hypertension, pulmonary hypertension, dementia, GERD, depression and moderate aortic stenosis presents for follow-up and reports that doing well     Psychiatry Dr. Pedro Pablo Barrett  - followed depression    Today she is here with  who is in the waiting room    1. Hypertension     Current medication treatment:   losartan (COZAAR) 50 MG tablet, Take 1 tablet (50 mg total) by mouth once daily., Disp: 90 tablet, Rfl: 3    Medication side effects: denies     Home BP cuff: none     BP Readings from Last 5 Encounters:  03/23/20 : 120/60  12/01/19 : (!) 155/71  11/25/19 : 124/64  09/09/19 : 134/72  08/19/19 : (!) 144/70    2. Dementia with depression     Age diagnosed: 87 yo  Neurology or Neuropsychiatry evaluation: yes Dr. Pedro Pablo Drake [facility]     Current symptoms: forgetful, no wandering, sleeping well  Behavioral disturbanced: denies  Sleep: denies     Current medication regimen:    donepezil 23 mg Tab, Take 1 tablet (23 mg total) by mouth once daily., Disp: 90 tablet, Rfl: 1  escitalopram oxalate (LEXAPRO) 20 MG tablet, TAKE 1 TABLET(20 MG) BY MOUTH EVERY DAY, Disp: 90 tablet, Rfl: 0    Prior medication  memantine (NAMENDA) 5 MG Tab, Take 1 tablet (5 mg total) by mouth 2 (two) times daily., Disp: 180 tablet, Rfl: 1  - stopped because of nightmares and agitation     Living situation: lives with   DME: cane and walker      ROS: Review of Systems   Constitutional: Negative.    HENT: Negative.    Eyes: Negative.    Respiratory: Negative.    Cardiovascular: Negative.    Gastrointestinal:  Negative.         GERD   Endocrine: Negative.    Genitourinary: Negative.    Musculoskeletal: Negative.    Skin: Negative.    Allergic/Immunologic: Negative.    Neurological: Negative.    Hematological: Negative.    Psychiatric/Behavioral: Negative for dysphoric mood and sleep disturbance. The patient is not nervous/anxious.        Past Medical History:   Diagnosis Date    Cataract 1998    remove from left eye    Dementia     Depression     GERD (gastroesophageal reflux disease)     Hypertension        Past Surgical History:   Procedure Laterality Date    HYSTERECTOMY         Family History   Problem Relation Age of Onset    Cancer Daughter         Lung    Heart disease Neg Hx        Social History     Tobacco Use    Smoking status: Never Smoker    Smokeless tobacco: Never Used   Substance Use Topics    Alcohol use: No    Drug use: No       Social History     Social History Narrative    Lives her . 4 adult children (1 daughter passed, 2 sons passed, 1 son living [place]). 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 Attends [employer]. Denies alcohol, smoking or illicit drugs.        ALLERGIES: Review of patient's allergies indicates:  No Known Allergies    MEDS:   Current Outpatient Medications:     aspirin 81 MG Chew, Take 1 tablet (81 mg total) by mouth once daily., Disp: , Rfl:     azelastine (ASTELIN) 137 mcg (0.1 %) nasal spray, 1 spray by Nasal route as needed., Disp: , Rfl:     donepezil (ARICEPT) 10 MG tablet, TAKE 1 TABLET(10 MG) BY MOUTH TWICE DAILY, Disp: 180 tablet, Rfl: 1    donepezil 23 mg Tab, Take 1 tablet (23 mg total) by mouth once daily., Disp: 90 tablet, Rfl: 1    escitalopram oxalate (LEXAPRO) 20 MG tablet, TAKE 1 TABLET(20 MG) BY MOUTH EVERY DAY, Disp: 90 tablet, Rfl: 0    fluticasone (FLONASE) 50 mcg/actuation nasal spray, 1 spray (50 mcg total) by Each Nare route 2 (two) times daily as needed for Rhinitis., Disp: 15 g, Rfl: 0    loratadine (CLARITIN) 10 mg  "tablet, Take 1 tablet (10 mg total) by mouth daily as needed for Allergies., Disp: 30 tablet, Rfl: 0    multivitamin capsule, Take 1 capsule by mouth once daily., Disp: , Rfl:     omeprazole (PRILOSEC) 20 MG capsule, TAKE 1 CAPSULE(20 MG) BY MOUTH EVERY DAY, Disp: 90 capsule, Rfl: 1    losartan (COZAAR) 50 MG tablet, Take 1 tablet (50 mg total) by mouth once daily., Disp: 90 tablet, Rfl: 3    OBJECTIVE:   Vitals:    03/23/20 1418   BP: 120/60   BP Location: Left arm   Patient Position: Sitting   BP Method: Large (Manual)   Pulse: 64   Resp: 18   Temp: 98.3 °F (36.8 °C)   TempSrc: Oral   SpO2: 99%   Weight: 60.1 kg (132 lb 8 oz)   Height: 5' 1" (1.549 m)     Body mass index is 25.04 kg/m².    Physical Exam   Constitutional: No distress.   HENT:   Head: Normocephalic and atraumatic.   Right Ear: Tympanic membrane and ear canal normal.   Left Ear: Tympanic membrane and ear canal normal.   Nose: Rhinorrhea present. No mucosal edema. Right sinus exhibits no maxillary sinus tenderness and no frontal sinus tenderness. Left sinus exhibits no maxillary sinus tenderness and no frontal sinus tenderness.   Mouth/Throat: Uvula is midline, oropharynx is clear and moist and mucous membranes are normal.   Neck: Neck supple.   Cardiovascular: Normal rate, regular rhythm and intact distal pulses. Exam reveals no gallop and no friction rub.   Murmur heard.   Systolic murmur is present with a grade of 3/6.  Pulmonary/Chest: Effort normal and breath sounds normal. She has no decreased breath sounds. She has no wheezes. She has no rhonchi. She has no rales.   Abdominal: Soft. Normal appearance and bowel sounds are normal. There is no hepatomegaly. There is no tenderness.   Musculoskeletal: She exhibits no edema.   Neurological: She is alert.         PERTINENT RESULTS:   [facility]  Lab Results   Component Value Date     09/09/2019    K 3.8 09/09/2019     09/09/2019    CO2 27 09/09/2019    BUN 15 09/09/2019    CREATININE 0.89 " 09/09/2019    CALCIUM 9.4 09/09/2019    ANIONGAP 7 (L) 09/09/2019    ESTGFRAFRICA >60.0 09/09/2019    EGFRNONAA 56.8 (A) 09/09/2019     Lab Results   Component Value Date    ALT 17 09/09/2019    AST 26 09/09/2019    ALKPHOS 86 09/09/2019    BILITOT 0.5 09/09/2019     Lab Results   Component Value Date    CHOL 229 (H) 03/28/2013     Lab Results   Component Value Date    HDL 66 03/28/2013     Lab Results   Component Value Date    LDLCALC 148.0 03/28/2013     Lab Results   Component Value Date    TRIG 77 03/28/2013     Lab Results   Component Value Date    CHOLHDL 28.8 03/28/2013       ASSESSMENT/PLAN:  Problem List Items Addressed This Visit        Neuro    Late onset Alzheimer's disease without behavioral disturbance    Overview     - diagnosed 2012 by Dr. Pedro Pablo Drake [facility]         Current Assessment & Plan     - stable  - good family support            Psychiatric    Depression    Overview     - diagnosed 2012 by Dr. Pedro Pablo Drake [facility]         Current Assessment & Plan     - well controlled  - continue current medications            Pulmonary    Pulmonary hypertension    Overview     - 3/12/19 Echo: PA pressure 41  mmHg         Current Assessment & Plan     - asymptomatic  - stable            Cardiac/Vascular    Essential hypertension (Chronic)    Current Assessment & Plan     - well controlled  - continue current medication  - monitor renal function         Relevant Medications    losartan (COZAAR) 50 MG tablet    Other Relevant Orders    Basic metabolic panel    Aortic stenosis, moderate (Chronic)    Overview     - 3/12/19 Echo: Mild to moderate aortic stenosis, ROBIN = 0.8 cm2, mean gradient = 17 mmHg.             Current Assessment & Plan     - asymptomatic  - declines surgical evaluation and due to age likely not a candidate            GI    Gastroesophageal reflux disease - Primary    Current Assessment & Plan     - stable  - continue current meds               ORDERS:   Orders Placed This  Encounter    Basic metabolic panel    losartan (COZAAR) 50 MG tablet     Vaccines recommended: up to date    Follow-up in 3-6 months recommend BMP ASAP  Dr. Queta Badillo D.O.   Family Medicine

## 2020-05-16 DIAGNOSIS — K21.9 GASTROESOPHAGEAL REFLUX DISEASE WITHOUT ESOPHAGITIS: ICD-10-CM

## 2020-05-18 ENCOUNTER — TELEPHONE (OUTPATIENT)
Dept: FAMILY MEDICINE | Facility: CLINIC | Age: 85
End: 2020-05-18

## 2020-05-18 RX ORDER — BENZONATATE 100 MG/1
100 CAPSULE ORAL 3 TIMES DAILY PRN
Qty: 30 CAPSULE | Refills: 0 | Status: SHIPPED | OUTPATIENT
Start: 2020-05-18 | End: 2020-05-28

## 2020-05-18 RX ORDER — PANTOPRAZOLE SODIUM 40 MG/1
TABLET, DELAYED RELEASE ORAL
Qty: 90 TABLET | Refills: 3 | Status: SHIPPED | OUTPATIENT
Start: 2020-05-18 | End: 2021-03-18 | Stop reason: SDUPTHER

## 2020-05-18 NOTE — TELEPHONE ENCOUNTER
----- Message from Laure Branham sent at 5/16/2020 10:25 AM CDT -----  Contact: patient  Type:  RX Refill Request    Who Called:  patient  Refill or New Rx: refill  RX Name and Strength: pantoprazole (PROTONIX) 40 MG tablet                                         benzonatate (TESSALON) 200 MG capsule  How is the patient currently taking it? (ex. 1XDay): as directed  Is this a 30 day or 90 day RX: 90 day  Pref[facility] Pharm[facility] with phone [facility]: [id number] - [place] - [address] AT [facility] [address]  Local or Mail Order: Local  Ordering Provider: Justino  Would the patient rather a call back or a response via [facility]?  Call back  Best Call Back Number: [phone]  Additional Information:  Please call to confirm when sent to the pharmacy

## 2020-07-02 ENCOUNTER — TELEPHONE (OUTPATIENT)
Dept: FAMILY MEDICINE | Facility: CLINIC | Age: 85
End: 2020-07-02

## 2020-07-02 DIAGNOSIS — N18.30 STAGE 3 CHRONIC KIDNEY DISEASE: Primary | ICD-10-CM

## 2020-07-02 NOTE — TELEPHONE ENCOUNTER
Spoke with pt.to set up urine & blood work one week prior to next sched.appt. pt. Di not want to schedule at this time. Will call office back to set up when ready.----- Message from Queta Badillo DO sent at 7/2/2020 12:42 PM CDT -----  Please call patient and check repeat BMP and urine tests 1 week before visit in September that is scheduled

## 2020-09-09 ENCOUNTER — PATIENT OUTREACH (OUTPATIENT)
Dept: ADMINISTRATIVE | Facility: HOSPITAL | Age: 85
End: 2020-09-09

## 2020-09-23 ENCOUNTER — TELEPHONE (OUTPATIENT)
Dept: FAMILY MEDICINE | Facility: CLINIC | Age: 85
End: 2020-09-23

## 2020-09-23 ENCOUNTER — OFFICE VISIT (OUTPATIENT)
Dept: FAMILY MEDICINE | Facility: CLINIC | Age: 85
End: 2020-09-23
Payer: MEDICARE

## 2020-09-23 VITALS
HEART RATE: 71 BPM | OXYGEN SATURATION: 98 % | WEIGHT: 126 LBS | BODY MASS INDEX: 23.79 KG/M2 | HEIGHT: 61 IN | SYSTOLIC BLOOD PRESSURE: 122 MMHG | DIASTOLIC BLOOD PRESSURE: 70 MMHG

## 2020-09-23 DIAGNOSIS — N28.9 ACUTE RENAL INSUFFICIENCY: ICD-10-CM

## 2020-09-23 DIAGNOSIS — I35.0 AORTIC STENOSIS, MODERATE: Chronic | ICD-10-CM

## 2020-09-23 DIAGNOSIS — K21.9 GASTROESOPHAGEAL REFLUX DISEASE WITHOUT ESOPHAGITIS: Primary | ICD-10-CM

## 2020-09-23 DIAGNOSIS — I10 ESSENTIAL HYPERTENSION: Chronic | ICD-10-CM

## 2020-09-23 DIAGNOSIS — F02.80 LATE ONSET ALZHEIMER'S DISEASE WITHOUT BEHAVIORAL DISTURBANCE: ICD-10-CM

## 2020-09-23 DIAGNOSIS — G30.1 LATE ONSET ALZHEIMER'S DISEASE WITHOUT BEHAVIORAL DISTURBANCE: ICD-10-CM

## 2020-09-23 PROCEDURE — 99214 OFFICE O/P EST MOD 30 MIN: CPT | Mod: PBBFAC,PN | Performed by: FAMILY MEDICINE

## 2020-09-23 PROCEDURE — 90694 VACC AIIV4 NO PRSRV 0.5ML IM: CPT | Mod: PBBFAC,PN

## 2020-09-23 PROCEDURE — 99999 PR PBB SHADOW E&M-EST. PATIENT-LVL IV: CPT | Mod: PBBFAC,,, | Performed by: FAMILY MEDICINE

## 2020-09-23 PROCEDURE — G0008 ADMIN INFLUENZA VIRUS VAC: HCPCS | Mod: PBBFAC

## 2020-09-23 PROCEDURE — 99999 PR PBB SHADOW E&M-EST. PATIENT-LVL IV: ICD-10-PCS | Mod: PBBFAC,,, | Performed by: FAMILY MEDICINE

## 2020-09-23 PROCEDURE — 99214 PR OFFICE/OUTPT VISIT, EST, LEVL IV, 30-39 MIN: ICD-10-PCS | Mod: S$PBB,,, | Performed by: FAMILY MEDICINE

## 2020-09-23 PROCEDURE — 99214 OFFICE O/P EST MOD 30 MIN: CPT | Mod: S$PBB,,, | Performed by: FAMILY MEDICINE

## 2020-09-23 NOTE — PROGRESS NOTES
FAMILY MEDICINE  [facility]    Patient Active Problem List   Diagnosis    Essential hypertension    Late onset Alzheimer's disease without behavioral disturbance    Depression    Gastroesophageal reflux disease    Aortic stenosis, moderate    Pulmonary hypertension    Cervical stenosis of spine       CC:   Chief Complaint   Patient presents with    Follow-up       SUBJECTIVE:  Lesa Holder   is a 89 y.o. female  - with hypertension, pulmonary hypertension, dementia, GERD, depression and moderate aortic stenosis presents for follow-up hypertension    Psychiatry Dr. Pedro Pablo Barrett  - followed depression    Today she is here with  who is in the waiting room    1. Hypertension     Current medication treatment:   losartan (COZAAR) 50 MG tablet, Take 1 tablet (50 mg total) by mouth once daily., Disp: 90 tablet, Rfl: 3    Medication side effects: denies     Home BP cuff: none     BP Readings from Last 5 Encounters:  09/23/20 : 122/70  03/23/20 : 120/60  12/01/19 : (!) 155/71  11/25/19 : 124/64  09/09/19 : 134/72    2. Dementia with depression     Age diagnosed: 85 yo  Neurology or Neuropsychiatry evaluation: yes Dr. Pedro Pablo Drake Psychaitry     Current symptoms: forgetful, no wandering, sleeping well  Behavioral disturbanced: denies  Sleep: denies     Current medication regimen:    donepezil 23 mg Tab, Take 1 tablet (23 mg total) by mouth once daily., Disp: 90 tablet, Rfl: 1  escitalopram oxalate (LEXAPRO) 20 MG tablet, TAKE 1 TABLET(20 MG) BY MOUTH EVERY DAY, Disp: 90 tablet, Rfl: 0    Prior medication  memantine (NAMENDA) 5 MG Tab, Take 1 tablet (5 mg total) by mouth 2 (two) times daily., Disp: 180 tablet, Rfl: 1  - stopped because of nightmares and agitation     Living situation: lives with   DME: cane and walker      ROS: Review of Systems   Constitutional: Negative.    HENT: Negative.    Eyes: Negative.    Respiratory: Negative.    Cardiovascular: Negative.    Gastrointestinal:  Negative.         GERD   Endocrine: Negative.    Genitourinary: Negative.    Musculoskeletal: Negative.    Skin: Negative.    Allergic/Immunologic: Negative.    Neurological: Negative.    Hematological: Negative.    Psychiatric/Behavioral: Negative for agitation, behavioral problems, dysphoric mood and sleep disturbance. The patient is not nervous/anxious.        Past Medical History:   Diagnosis Date    Cataract 1998    remove from left eye    Dementia     Depression     GERD (gastroesophageal reflux disease)     Hypertension        Past Surgical History:   Procedure Laterality Date    HYSTERECTOMY         Family History   Problem Relation Age of Onset    Cancer Daughter         Lung    Heart disease Neg Hx        Social History     Tobacco Use    Smoking status: Never Smoker    Smokeless tobacco: Never Used   Substance Use Topics    Alcohol use: No    Drug use: No       Social History     Social History Narrative    Lives her . 4 adult children (1 daughter passed, 2 sons passed, 1 son living [place]). 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 Denies alcohol, smoking or illicit drugs.        ALLERGIES: Review of patient's allergies indicates:  No Known Allergies    MEDS:   Current Outpatient Medications:     azelastine (ASTELIN) 137 mcg (0.1 %) nasal spray, 1 spray by Nasal route as needed., Disp: , Rfl:     donepezil (ARICEPT) 10 MG tablet, TAKE 1 TABLET(10 MG) BY MOUTH TWICE DAILY, Disp: 180 tablet, Rfl: 1    escitalopram oxalate (LEXAPRO) 20 MG tablet, TAKE 1 TABLET(20 MG) BY MOUTH EVERY DAY, Disp: 90 tablet, Rfl: 0    fluticasone (FLONASE) 50 mcg/actuation nasal spray, 1 spray (50 mcg total) by Each Nare route 2 (two) times daily as needed for Rhinitis., Disp: 15 g, Rfl: 0    losartan (COZAAR) 50 MG tablet, Take 1 tablet (50 mg total) by mouth once daily., Disp: 90 tablet, Rfl: 3    multivitamin capsule, Take 1 capsule by mouth once daily., Disp: , Rfl:     " pantoprazole (PROTONIX) 40 MG tablet, TAKE 1 TABLET(40 MG) BY MOUTH EVERY DAY, Disp: 90 tablet, Rfl: 3    aspirin 81 MG Chew, Take 1 tablet (81 mg total) by mouth once daily. (Patient not taking: Reported on 9/23/2020), Disp: , Rfl:     donepezil 23 mg Tab, Take 1 tablet (23 mg total) by mouth once daily., Disp: 90 tablet, Rfl: 1    OBJECTIVE:   Vitals:    09/23/20 1435   BP: 122/70   BP Location: Left arm   Patient Position: Sitting   BP Method: Medium (Manual)   Pulse: 71   SpO2: 98%   Weight: 57.2 kg (126 lb)   Height: 5' 1" (1.549 m)     Body mass index is 23.81 kg/m².    Physical Exam  Constitutional:       General: She is not in acute distress.  Neck:      Musculoskeletal: Neck supple.   Cardiovascular:      Rate and Rhythm: Normal rate and regular rhythm.      Pulses: Normal pulses.      Heart sounds: Murmur present. No friction rub. No gallop.    Pulmonary:      Effort: Pulmonary effort is normal.      Breath sounds: Normal breath sounds. No wheezing, rhonchi or rales.   Abdominal:      General: Bowel sounds are normal. There is no distension.      Palpations: Abdomen is soft.      Tenderness: There is no guarding.   Musculoskeletal:      Right lower leg: No edema.      Left lower leg: No edema.   Neurological:      Mental Status: She is alert.           PERTINENT RESULTS:   BMP  Lab Results   Component Value Date     (L) 06/22/2020    K 4.5 06/22/2020     06/22/2020    CO2 28 06/22/2020    BUN 14 06/22/2020    CREATININE 0.99 06/22/2020    CALCIUM 9.5 06/22/2020    ANIONGAP 7 (L) 06/22/2020    ESTGFRAFRICA 57.2 (A) 06/22/2020    EGFRNONAA 49.6 (A) 06/22/2020     Lab Results   Component Value Date    ALT 17 09/09/2019    AST 26 09/09/2019    ALKPHOS 86 09/09/2019    BILITOT 0.5 09/09/2019     Lab Results   Component Value Date    CHOL 229 (H) 03/28/2013     Lab Results   Component Value Date    HDL 66 03/28/2013     Lab Results   Component Value Date    LDLCALC 148.0 03/28/2013     Lab Results "   Component Value Date    TRIG 77 03/28/2013     Lab Results   Component Value Date    CHOLHDL 28.8 03/28/2013       ASSESSMENT/PLAN:  Problem List Items Addressed This Visit        Neuro    Late onset Alzheimer's disease without behavioral disturbance    Overview     - diagnosed 2012 by Dr. Pedro Pablo Drake Psych[facility]            Cardiac/Vascular    Aortic stenosis, moderate (Chronic)    Overview     - 3/12/19 Echo: Mild to moderate aortic stenosis, ROBIN = 0.8 cm2, mean gradient = 17 mmHg.   - asymptomatic  - declines surgical evaluation           Essential hypertension (Chronic)    Overview     - well controlled  - continue current medication         Relevant Orders    Basic metabolic panel    Lipid Panel    Magnesium    Comprehensive metabolic panel       GI    Gastroesophageal reflux disease - Primary    Overview     - counseling patient of concerns for long term use of PPI for treatment of GERD including but no limited to increased risk of infection, increased risk of malignancy, change in gut fan, and increased risk of dementia  - proven risks of long term PPI use, including pneumonia, c.diff infection, and bone loss, as well as theoretical risks including dementia and CKD, were discussed with the patient at length and the patient understands risks and benefits of therapy.  Option of tapering/weaning PPI away was also discussed, including the need for possible long term therapy to treat symptoms if they recur after cessation of medication, as well as to mitigate the risk of developing complications of reflux such as Prescott's esophagus and/or esophageal cancer.    - patient understands the risks and benefits of treatment with drug versus cessation.    - recommend daily supplementation with MVI with calcium and vitamin D   - recommend primary prevention of symptoms through diet fi able and okay to continue medication if unable         Relevant Orders    CBC Without Differential      Other Visit Diagnoses      Acute renal insufficiency        Relevant Orders    Basic metabolic panel          ORDERS:   Orders Placed This Encounter    Influenza - Quadrivalent (Adjuvanted)    Basic metabolic panel    Lipid Panel    CBC Without Differential    Magnesium    Comprehensive metabolic panel     Vaccines recommended: up to date    Follow-up in 3-6 months with labs and BMP today or sooner with any concerns    Dr. Queta Badillo D.O.   [facility]

## 2020-09-23 NOTE — TELEPHONE ENCOUNTER
----- Message from Queta Badillo DO sent at 9/23/2020  4:57 PM CDT -----  Please call patient kidney function stable

## 2021-03-18 DIAGNOSIS — I10 ESSENTIAL HYPERTENSION: Chronic | ICD-10-CM

## 2021-03-18 DIAGNOSIS — K21.9 GASTROESOPHAGEAL REFLUX DISEASE WITHOUT ESOPHAGITIS: ICD-10-CM

## 2021-03-18 RX ORDER — LOSARTAN POTASSIUM 50 MG/1
50 TABLET ORAL DAILY
Qty: 90 TABLET | Refills: 3 | Status: SHIPPED | OUTPATIENT
Start: 2021-03-18 | End: 2021-03-22

## 2021-03-18 RX ORDER — PANTOPRAZOLE SODIUM 40 MG/1
40 TABLET, DELAYED RELEASE ORAL DAILY
Qty: 90 TABLET | Refills: 3 | Status: SHIPPED | OUTPATIENT
Start: 2021-03-18 | End: 2021-03-22 | Stop reason: SDUPTHER

## 2021-03-22 DIAGNOSIS — K21.9 GASTROESOPHAGEAL REFLUX DISEASE WITHOUT ESOPHAGITIS: ICD-10-CM

## 2021-03-22 DIAGNOSIS — I10 ESSENTIAL HYPERTENSION: Chronic | ICD-10-CM

## 2021-03-22 RX ORDER — LOSARTAN POTASSIUM 100 MG/1
1 TABLET ORAL DAILY
COMMUNITY
Start: 2021-02-06 | End: 2021-03-23

## 2021-03-22 RX ORDER — LOSARTAN POTASSIUM 50 MG/1
50 TABLET ORAL DAILY
Qty: 90 TABLET | Refills: 3 | Status: SHIPPED | OUTPATIENT
Start: 2021-03-22 | End: 2022-03-07

## 2021-03-22 RX ORDER — PANTOPRAZOLE SODIUM 40 MG/1
40 TABLET, DELAYED RELEASE ORAL DAILY
Qty: 90 TABLET | Refills: 3 | Status: SHIPPED | OUTPATIENT
Start: 2021-03-22 | End: 2022-04-20

## 2021-03-23 ENCOUNTER — OFFICE VISIT (OUTPATIENT)
Dept: FAMILY MEDICINE | Facility: CLINIC | Age: 86
End: 2021-03-23
Payer: MEDICARE

## 2021-03-23 VITALS
SYSTOLIC BLOOD PRESSURE: 126 MMHG | OXYGEN SATURATION: 98 % | BODY MASS INDEX: 24.58 KG/M2 | WEIGHT: 130.19 LBS | DIASTOLIC BLOOD PRESSURE: 72 MMHG | HEIGHT: 61 IN | RESPIRATION RATE: 16 BRPM | HEART RATE: 74 BPM

## 2021-03-23 DIAGNOSIS — G30.1 LATE ONSET ALZHEIMER'S DISEASE WITHOUT BEHAVIORAL DISTURBANCE: ICD-10-CM

## 2021-03-23 DIAGNOSIS — R73.9 HYPERGLYCEMIA: ICD-10-CM

## 2021-03-23 DIAGNOSIS — F02.80 LATE ONSET ALZHEIMER'S DISEASE WITHOUT BEHAVIORAL DISTURBANCE: ICD-10-CM

## 2021-03-23 DIAGNOSIS — I10 ESSENTIAL HYPERTENSION: Primary | Chronic | ICD-10-CM

## 2021-03-23 DIAGNOSIS — I35.0 AORTIC STENOSIS, MODERATE: Chronic | ICD-10-CM

## 2021-03-23 DIAGNOSIS — N18.31 STAGE 3A CHRONIC KIDNEY DISEASE: ICD-10-CM

## 2021-03-23 PROCEDURE — 99214 OFFICE O/P EST MOD 30 MIN: CPT | Mod: PBBFAC,PN | Performed by: FAMILY MEDICINE

## 2021-03-23 PROCEDURE — 99999 PR PBB SHADOW E&M-EST. PATIENT-LVL IV: CPT | Mod: PBBFAC,,, | Performed by: FAMILY MEDICINE

## 2021-03-23 PROCEDURE — 99214 PR OFFICE/OUTPT VISIT, EST, LEVL IV, 30-39 MIN: ICD-10-PCS | Mod: S$PBB,,, | Performed by: FAMILY MEDICINE

## 2021-03-23 PROCEDURE — 99214 OFFICE O/P EST MOD 30 MIN: CPT | Mod: S$PBB,,, | Performed by: FAMILY MEDICINE

## 2021-03-23 PROCEDURE — 99999 PR PBB SHADOW E&M-EST. PATIENT-LVL IV: ICD-10-PCS | Mod: PBBFAC,,, | Performed by: FAMILY MEDICINE

## 2021-09-20 ENCOUNTER — TELEPHONE (OUTPATIENT)
Dept: FAMILY MEDICINE | Facility: CLINIC | Age: 86
End: 2021-09-20

## 2021-09-21 ENCOUNTER — TELEPHONE (OUTPATIENT)
Dept: FAMILY MEDICINE | Facility: CLINIC | Age: 86
End: 2021-09-21

## 2021-09-23 ENCOUNTER — OFFICE VISIT (OUTPATIENT)
Dept: FAMILY MEDICINE | Facility: CLINIC | Age: 86
End: 2021-09-23
Payer: MEDICARE

## 2021-09-23 VITALS
OXYGEN SATURATION: 98 % | WEIGHT: 118.5 LBS | BODY MASS INDEX: 22.37 KG/M2 | RESPIRATION RATE: 18 BRPM | HEIGHT: 61 IN | DIASTOLIC BLOOD PRESSURE: 70 MMHG | HEART RATE: 99 BPM | SYSTOLIC BLOOD PRESSURE: 105 MMHG

## 2021-09-23 DIAGNOSIS — N18.31 STAGE 3A CHRONIC KIDNEY DISEASE: Primary | ICD-10-CM

## 2021-09-23 DIAGNOSIS — I27.20 PULMONARY HYPERTENSION: ICD-10-CM

## 2021-09-23 DIAGNOSIS — I35.0 AORTIC STENOSIS, MODERATE: Chronic | ICD-10-CM

## 2021-09-23 DIAGNOSIS — G30.1 LATE ONSET ALZHEIMER'S DISEASE WITHOUT BEHAVIORAL DISTURBANCE: ICD-10-CM

## 2021-09-23 DIAGNOSIS — F02.80 LATE ONSET ALZHEIMER'S DISEASE WITHOUT BEHAVIORAL DISTURBANCE: ICD-10-CM

## 2021-09-23 DIAGNOSIS — I10 ESSENTIAL HYPERTENSION: Chronic | ICD-10-CM

## 2021-09-23 PROCEDURE — 99999 PR PBB SHADOW E&M-EST. PATIENT-LVL IV: ICD-10-PCS | Mod: PBBFAC,,, | Performed by: FAMILY MEDICINE

## 2021-09-23 PROCEDURE — 99214 PR OFFICE/OUTPT VISIT, EST, LEVL IV, 30-39 MIN: ICD-10-PCS | Mod: S$PBB,,, | Performed by: FAMILY MEDICINE

## 2021-09-23 PROCEDURE — 99214 OFFICE O/P EST MOD 30 MIN: CPT | Mod: S$PBB,,, | Performed by: FAMILY MEDICINE

## 2021-09-23 PROCEDURE — 99999 PR PBB SHADOW E&M-EST. PATIENT-LVL IV: CPT | Mod: PBBFAC,,, | Performed by: FAMILY MEDICINE

## 2021-09-23 PROCEDURE — 99214 OFFICE O/P EST MOD 30 MIN: CPT | Mod: PBBFAC,PN | Performed by: FAMILY MEDICINE

## 2021-09-28 ENCOUNTER — IMMUNIZATION (OUTPATIENT)
Dept: FAMILY MEDICINE | Facility: CLINIC | Age: 86
End: 2021-09-28
Payer: MEDICARE

## 2021-09-28 DIAGNOSIS — Z23 NEED FOR VACCINATION: Primary | ICD-10-CM

## 2021-09-28 PROCEDURE — 91300 COVID-19, MRNA, LNP-S, PF, 30 MCG/0.3 ML DOSE VACCINE: CPT | Mod: PBBFAC,PN | Performed by: NURSE ANESTHETIST, CERTIFIED REGISTERED

## 2021-09-28 PROCEDURE — 0003A COVID-19, MRNA, LNP-S, PF, 30 MCG/0.3 ML DOSE VACCINE: CPT | Mod: PBBFAC | Performed by: NURSE ANESTHETIST, CERTIFIED REGISTERED

## 2022-02-18 ENCOUNTER — TELEPHONE (OUTPATIENT)
Dept: FAMILY MEDICINE | Facility: CLINIC | Age: 87
End: 2022-02-18
Payer: MEDICARE

## 2022-02-18 NOTE — TELEPHONE ENCOUNTER
----- Message from Candis Tucker sent at 2/18/2022  4:12 PM CST -----  Contact: son  Type:  Patient Returning Call    Who Called:Zak (son)  Would the patient rather a call back or a response via [facility]? Call   Best Call Back Number:[phone]  Additional Information:     Pt's son would like a call back

## 2022-03-03 PROBLEM — R62.7 FAILURE TO THRIVE IN ADULT: Status: ACTIVE | Noted: 2022-03-03

## 2022-03-04 PROBLEM — Z71.89 GOALS OF CARE, COUNSELING/DISCUSSION: Status: ACTIVE | Noted: 2022-03-04

## 2022-03-04 PROBLEM — R62.7 FAILURE TO THRIVE IN ADULT: Status: RESOLVED | Noted: 2022-03-03 | Resolved: 2022-03-04

## 2022-03-07 ENCOUNTER — TELEPHONE (OUTPATIENT)
Dept: FAMILY MEDICINE | Facility: CLINIC | Age: 87
End: 2022-03-07
Payer: MEDICARE

## 2022-03-07 NOTE — TELEPHONE ENCOUNTER
----- Message from Nelly Trujillo sent at 3/7/2022  8:21 AM CST -----  Regarding: ED follow up  Contact: [phone]/driss Crespo  Patient's son Zak is requesting a call back regarding scheduling her ED follow up appointment. This week if possible.   Would the patient rather a call back or a response via [facility]?  call  Best Call Back Number:  [phone]/driss Crespo  Additional Information:

## 2022-03-08 ENCOUNTER — TELEPHONE (OUTPATIENT)
Dept: FAMILY MEDICINE | Facility: CLINIC | Age: 87
End: 2022-03-08
Payer: MEDICARE

## 2022-03-08 PROCEDURE — G0180 MD CERTIFICATION HHA PATIENT: HCPCS | Mod: ,,, | Performed by: FAMILY MEDICINE

## 2022-03-08 PROCEDURE — G0180 PR HOME HEALTH MD CERTIFICATION: ICD-10-PCS | Mod: ,,, | Performed by: FAMILY MEDICINE

## 2022-03-08 NOTE — TELEPHONE ENCOUNTER
----- Message from Sayra Tan sent at 3/8/2022 10:07 AM CST -----  Type:  Sooner Apoointment Request    Caller is requesting a sooner appointment.  Caller declined first available appointment listed below.  Caller will not accept being placed on the waitlist and is requesting a message be sent to doctor.  Name of Caller:pt son  When is the first available appointment?03/23/22  Symptoms:ER follow up needed 03/04/22  Would the patient rather a call back or a response via [facility]? Please call  Best Call Back Number:[phone]  Additional Information:

## 2022-03-09 ENCOUNTER — OUTPATIENT CASE MANAGEMENT (OUTPATIENT)
Dept: ADMINISTRATIVE | Facility: OTHER | Age: 87
End: 2022-03-09
Payer: MEDICARE

## 2022-03-09 ENCOUNTER — TELEPHONE (OUTPATIENT)
Dept: FAMILY MEDICINE | Facility: CLINIC | Age: 87
End: 2022-03-09
Payer: MEDICARE

## 2022-03-09 NOTE — TELEPHONE ENCOUNTER
----- Message from Jean Britton sent at 3/8/2022  3:15 PM CST -----  Type:  Facility Requesting Call Back    Who Called Savita of [facility] for patient  Would the patient rather a call back or a response via [facility]? Call back  Best Call Back Number:[phone]  Additional Information:  Facility Requesting Call Back regarding primary care

## 2022-03-09 NOTE — PROGRESS NOTES
Outpatient Care Management   - Low Risk Patient Assessment    Patient: Lesa Holder  MRN:  [id number]  Date of Service:  3/9/2022  Completed by:  Tracy Trujillo LMSW  Referral Date: 03/04/2022  Program: OPCM Low Risk    Reason for Visit   Patient presents with    Social Work Assessment - Low/Mod Risk    Case Closure       Brief Summary: SW completed assessment with patient and spouse. Spouse reports he and patient resides together. Spouse reports HH in the home providing service. Spouse reports patient ambulates with a walker. Spouse denied patient needs assistance with food, shelter, medication or medical. Spouse reports son provides transportation to and from appointment. Spouse reports he is patient health care proxy. Spouse reports patient Current symptoms as sleep disturbance and memory problems. These symptoms will be discussed at upcoming appointment. Spouse denied any further needs. SW will close case.       Patient Summary     OPCM Social Work Assessment (Low/Moderate Risk)    General  Level of Caregiver support: Member independent and does not need caregiver assistance  Have you had to make a decision between paying for any of the following in the last 2 months?: None  Transportation means: Family  Employment status: Retired and not working  Current symptoms: Sleep disturbances, Memory problems  Assessments  Was the PHQ Depression Screening completed this visit?: No  Was the GODFREY-7 Screening completed this visit?: No         Complex Care Plan     Care plan was discussed and completed today with input from patient and/or caregiver.    Patient Instructions     No follow-ups on file.    Todays OPCM Self-Management Care Plan was developed with the patients/caregivers input and was based on identified barriers from todays assessment.  Goals were written today with the patient/caregiver and the patient has agreed to work towards these goals to improve his/her overall well-being. Patient  verbalized understanding of the care plan, goals, and all of today's instructions. Encouraged patient/caregiver to communicate with his/her physician and health care team about health conditions and the treatment plan.  Provided my contact information today and encouraged patient/caregiver to call me with any questions as needed.

## 2022-03-09 NOTE — TELEPHONE ENCOUNTER
Spoke with Savita she wanted to let you know she admitted pt yesterday. Asked for med list, stated I will fax it over.        Fax # [phone]

## 2022-03-15 ENCOUNTER — TELEPHONE (OUTPATIENT)
Dept: FAMILY MEDICINE | Facility: CLINIC | Age: 87
End: 2022-03-15
Payer: MEDICARE

## 2022-03-15 NOTE — TELEPHONE ENCOUNTER
Spoke to Olesya / [facility] - [phone]. Olesya stated pt and  refused a visit.        Zahira granddaughter: [phone]

## 2022-03-15 NOTE — TELEPHONE ENCOUNTER
----- Message from Jean Britton sent at 3/15/2022 12:03 PM CDT -----  Type:  Home Health Requesting call back    Who Called:O[facility]ya of Home Health for patient  Would the patient rather a call back or a response via [facility]? Call back  Best Call Back Number:[phone]  Additional Information: Olesya called to inform visit rejected by  on today.

## 2022-03-16 ENCOUNTER — TELEPHONE (OUTPATIENT)
Dept: FAMILY MEDICINE | Facility: CLINIC | Age: 87
End: 2022-03-16
Payer: MEDICARE

## 2022-03-16 DIAGNOSIS — Q84.5 ENLARGED AND HYPERTROPHIC NAILS: Primary | ICD-10-CM

## 2022-03-16 NOTE — TELEPHONE ENCOUNTER
----- Message from Melissa Barker MA sent at 3/16/2022  4:13 PM CDT -----  Pt needs a podiatrist referral family stated pt nails are extremity long and curled. Want to see if we can schedule them same day they see you?

## 2022-03-16 NOTE — TELEPHONE ENCOUNTER
----- Message from Suzette Fu sent at 3/16/2022 12:17 PM CDT -----  Needs advice from nurse:      Who Called:granddaughter-lAlegra Frost  Regarding:returning a call  Would the patient rather a call back or VIA [facility]?  Best Call Back number:  Additional Info:

## 2022-03-16 NOTE — TELEPHONE ENCOUNTER
Orders Placed This Encounter    Ambulatory referral/consult to Podiatry     Dr. Queta Badillo D.O.   [facility]

## 2022-03-17 NOTE — TELEPHONE ENCOUNTER
Spoke with granddaughter stated we were able to get pt scheduled with Dr. Zafar the same day she will see Dr. Badillo.

## 2022-03-21 ENCOUNTER — TELEPHONE (OUTPATIENT)
Dept: FAMILY MEDICINE | Facility: CLINIC | Age: 87
End: 2022-03-21
Payer: MEDICARE

## 2022-03-21 NOTE — TELEPHONE ENCOUNTER
----- Message from Liset Mcdonough sent at 3/21/2022  3:07 PM CDT -----  Type:  Needs Medical Advice    Who Called:  pt son   Symptoms (please be specific):  would like to get pt appt rescheduled to after 12noon if possible     Would the patient rather a call back or a response via [facility]?  Please call   Best Call Back Number:  [phone]  Additional Information:

## 2022-03-23 ENCOUNTER — EXTERNAL HOME HEALTH (OUTPATIENT)
Dept: HOME HEALTH SERVICES | Facility: HOSPITAL | Age: 87
End: 2022-03-23
Payer: MEDICARE

## 2022-03-29 ENCOUNTER — PATIENT OUTREACH (OUTPATIENT)
Dept: ADMINISTRATIVE | Facility: OTHER | Age: 87
End: 2022-03-29
Payer: MEDICARE

## 2022-03-29 NOTE — PROGRESS NOTES
FAMILY MEDICINE  [facility]    Reason for visit:   Chief Complaint   Patient presents with    Follow-up       SUBJECTIVE: Lesa Holder is a 89 y.o. female  - with hypertension, pulmonary hypertension, dementia, GERD, depression and moderate aortic stenosis presents for follow-up hypertension     Psychiatry Dr. Pedro Pablo Barrett  Cardiology Dr. Foster (request copy of lab)     Today she is here with her  and son, Ty Crum and  Ty (94 yo)    Lesa Holder was seen in the ER yesterday 3/29/22 for fall with head injury (ER evaluation reviewed).  Reports that she is walking down her driveway and slipped.  She fell forward.  She did not have her walker at that time but she does have a walker at home.  She has recently been having more difficulty walking and son noticed that her toenails are extremely long and thick.  He seemed to be impeding her gait. She has an appointment with Podiatry today to have her toenails trimmed.    She reports that she is healing well.  She still has abrasions on her forehead nose and cheek as well as her upper lip.  She has swelling bilateral eyes.  She reports overall has improved.  She reports pain is well controlled.  She denies any headaches, vision changes, increase fatigue, drowsiness, nausea, vomiting or changes in mental status.     1. Hypertension     Current medication treatment:   losartan (COZAAR) 50 MG tablet, Take 1 tablet (50 mg total) by mouth once daily., Disp: 90 tablet, Rfl: 3     Medication side effects: denies     Home BP cuff: none     2. Dementia with depression     Age diagnosed: 87 yo  Neurology or Neuropsychiatry evaluation: yes Dr. Pedro Pablo Drake Psychait[facility]     Current symptoms: forgetful, no wandering, sleeping well  Behavioral disturbanced: denies  Sleep: denies     Current medication regimen:    donepezil 23 mg Tab, Take 1 tablet (23 mg total) by mouth once daily., Disp: 90 tablet, Rfl: 1  escitalopram oxalate (LEXAPRO) 20 MG tablet,  TAKE 1 TABLET(20 MG) BY MOUTH EVERY DAY, Disp: 90 tablet, Rfl: 0    Prior medication  memantine (NAMENDA) 5 MG Tab, Take 1 tablet (5 mg total) by mouth 2 (two) times daily., Disp: 180 tablet, Rfl: 1  - stopped because of nightmares and agitation     Living situation: lives with   DME: cane and walker            Review of Systems   All other systems reviewed and are negative.      HEALTH MAINTENANCE:   Health Maintenance   Topic Date Due    Lipid Panel  03/23/2026    TETANUS VACCINE  09/07/2026     Health Maintenance Topics with due status: Not Due       Topic Last Completion Date    TETANUS VACCINE 09/07/2016    Lipid Panel 03/23/2021     Health Maintenance Due   Topic Date Due    Influenza Vaccine (1) 09/01/2021       HISTORY:   Past Medical History:   Diagnosis Date    Cataract 1998    remove from left eye    Dementia     Depression     GERD (gastroesophageal reflux disease)     Hypertension        Past Surgical History:   Procedure Laterality Date    HYSTERECTOMY         Family History   Problem Relation Age of Onset    Cancer Daughter         Lung    Heart disease Neg Hx        Social History     Tobacco Use    Smoking status: Never Smoker    Smokeless tobacco: Never Used   Substance Use Topics    Alcohol use: No    Drug use: No       Social History     Social History Narrative    Lives her . 4 adult children (1 daughter passed, 2 sons passed, 1 son living [place]). 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 Denies alcohol, smoking or illicit drugs.        ALLERGIES:   Review of patient's allergies indicates:  No Known Allergies    MEDS:     Current Outpatient Medications:     donepezil 23 mg Tab, Take 1 tablet (23 mg total) by mouth once daily., Disp: 90 tablet, Rfl: 1    escitalopram oxalate (LEXAPRO) 20 MG tablet, TAKE 1 TABLET(20 MG) BY MOUTH EVERY DAY, Disp: 90 tablet, Rfl: 0    multivitamin capsule, Take 1 capsule by mouth once daily., Disp: ,  "Rfl:     pantoprazole (PROTONIX) 40 MG tablet, Take 1 tablet (40 mg total) by mouth once daily., Disp: 90 tablet, Rfl: 3      Vital signs:   Vitals:    03/30/22 1119   BP: 112/62   BP Location: Left arm   Patient Position: Sitting   BP Method: Large (Manual)   Pulse: 77   Resp: 18   SpO2: 97%   Weight: 47 kg (103 lb 11.2 oz)   Height: 5' 1" (1.549 m)     Body mass index is 19.59 kg/m².  PHYSICAL EXAM:     Physical Exam  Constitutional:       General: She is not in acute distress.  HENT:      Head: Normocephalic and atraumatic.     Eyes:      Extraocular Movements: Extraocular movements intact.      Conjunctiva/sclera: Conjunctivae normal.      Comments: Bilateral periorbital swelling   Cardiovascular:      Rate and Rhythm: Normal rate and regular rhythm.      Pulses: Normal pulses.      Heart sounds: Murmur heard.     No friction rub. No gallop.   Pulmonary:      Effort: Pulmonary effort is normal.      Breath sounds: Normal breath sounds. No wheezing, rhonchi or rales.   Abdominal:      Palpations: There is no mass.   Musculoskeletal:      Cervical back: Neck supple.      Right lower leg: No edema.      Left lower leg: No edema.   Neurological:      Mental Status: She is alert.           PHQ4 = Score: 0    PERTINENT RESULTS:   No visits with results within 1 Week(s) from this visit.   Latest known visit with results is:   Admission on 03/03/2022, Discharged on 03/04/2022   Component Date Value Ref Range Status    WBC 03/03/2022 6.36  3.90 - 12.70 K/uL Final    RBC 03/03/2022 3.15 (A) 4.00 - 5.40 M/uL Final    Hemoglobin 03/03/2022 9.5 (A) 12.0 - 16.0 g/dL Final    Hematocrit 03/03/2022 28.5 (A) 37.0 - 48.5 % Final    MCV 03/03/2022 91  82 - 98 fL Final    MCH 03/03/2022 30.2  27.0 - 31.0 pg Final    MCHC 03/03/2022 33.3  32.0 - 36.0 g/dL Final    RDW 03/03/2022 15.0 (A) 11.5 - 14.5 % Final    Platelets 03/03/2022 238  150 - 450 K/uL Final    MPV 03/03/2022 8.8 (A) 9.2 - 12.9 fL Final    Immature " Granulocytes 03/03/2022 0.2  0.0 - 0.5 % Final    Gran # (ANC) 03/03/2022 3.6  1.8 - 7.7 K/uL Final    Immature Grans (Abs) 03/03/2022 0.01  0.00 - 0.04 K/uL Final    Comment: Mild elevation in immature granulocytes is non specific and   can be seen in a variety of conditions including stress response,   acute inflammation, trauma and pregnancy. Correlation with other   laboratory and clinical findings is essential.      Lymph # 03/03/2022 1.9  1.0 - 4.8 K/uL Final    Mono # 03/03/2022 0.8  0.3 - 1.0 K/uL Final    Eos # 03/03/2022 0.1  0.0 - 0.5 K/uL Final    Baso # 03/03/2022 0.03  0.00 - 0.20 K/uL Final    nRBC 03/03/2022 0  0 /100 WBC Final    Gran % 03/03/2022 56.7  38.0 - 73.0 % Final    Lymph % 03/03/2022 29.7  18.0 - 48.0 % Final    Mono % 03/03/2022 11.8  4.0 - 15.0 % Final    Eosinophil % 03/03/2022 1.1  0.0 - 8.0 % Final    Basophil % 03/03/2022 0.5  0.0 - 1.9 % Final    Differential Method 03/03/2022 Automated   Final    Sodium 03/03/2022 143  136 - 145 mmol/L Final    Potassium 03/03/2022 3.7  3.5 - 5.1 mmol/L Final    Chloride 03/03/2022 104  95 - 110 mmol/L Final    CO2 03/03/2022 26  23 - 29 mmol/L Final    Glucose 03/03/2022 121 (A) 70 - 110 mg/dL Final    BUN 03/03/2022 33 (A) 7 - 17 mg/dL Final    Creatinine 03/03/2022 1.05  0.50 - 1.40 mg/dL Final    Calcium 03/03/2022 9.2  8.7 - 10.5 mg/dL Final    Total Protein 03/03/2022 7.0  6.0 - 8.4 g/dL Final    Albumin 03/03/2022 3.6  3.5 - 5.2 g/dL Final    Total Bilirubin 03/03/2022 0.4  0.1 - 1.0 mg/dL Final    Comment: For infants and newborns, interpretation of results should be based  on gestational age, weight and in agreement with clinical  observations.    Premature Infant recommended reference ranges:  Up to 24 hours.............<8.0 mg/dL  Up to 48 hours............<12.0 mg/dL  3-5 days..................<15.0 mg/dL  6-29 days.................<15.0 mg/dL      Alkaline Phosphatase 03/03/2022 67  38 - 126 U/L Final    AST  03/03/2022 33  15 - 46 U/L Final    ALT 03/03/2022 15  10 - 44 U/L Final    Anion Gap 03/03/2022 13  8 - 16 mmol/L Final    eGFR if  03/03/2022 52.5 (A) >60 mL/min/1.73 m^2 Final    eGFR if non African American 03/03/2022 45.6 (A) >60 mL/min/1.73 m^2 Final    Comment: Calculation used to obtain the estimated glomerular filtration  rate (eGFR) is the CKD-EPI equation.       Specimen UA 03/03/2022 Urine, Catheterized   Final    Color, UA 03/03/2022 Yellow  Yellow, Straw, Marcia Final    Appearance, UA 03/03/2022 Clear  Clear Final    pH, UA 03/03/2022 5.0  5.0 - 8.0 Final    Specific Gravity, UA 03/03/2022 >=1.030 (A) 1.005 - 1.030 Final    Protein, UA 03/03/2022 1+ (A) Negative Final    Comment: Recommend a 24 hour urine protein or a urine   protein/creatinine ratio if globulin induced proteinuria is  clinically suspected.      Glucose, UA 03/03/2022 Negative  Negative Final    Ketones, UA 03/03/2022 Negative  Negative Final    Bilirubin (UA) 03/03/2022 Negative  Negative Final    Occult Blood UA 03/03/2022 Trace (A) Negative Final    Nitrite, UA 03/03/2022 Negative  Negative Final    Urobilinogen, UA 03/03/2022 Negative  <2.0 EU/dL Final    Leukocytes, UA 03/03/2022 Negative  Negative Final    RBC, UA 03/03/2022 5 (A) 0 - 4 /hpf Final    WBC, UA 03/03/2022 4  0 - 5 /hpf Final    Bacteria 03/03/2022 Occasional  None-Occ /hpf Final    Hyaline Casts, UA 03/03/2022 0  0-1/lpf /lpf Final    Microscopic Comment 03/03/2022 SEE COMMENT   Final    Comment: Other formed elements not mentioned in the report are not   present in the microscopic examination.       SARS-CoV-2 RNA, Amplification, Qual 03/03/2022 Negative  Negative Final    Comment: This test utilizes isothermal nucleic acid amplification   technology to detect the SARS-CoV-2 RdRp nucleic acid segment.   The analytical sensitivity (limit of detection) is 125 genome   equivalents/mL.     A POSITIVE result implies infection with  the SARS-CoV-2 virus;  the patient is presumed to be contagious.    A NEGATIVE result means that SARS-CoV-2 nucleic acids are not  present above the limit of detection. A NEGATIVE result should be   treated as presumptive. It does not rule out the possibility of   COVID-19 and should not be the sole basis for treatment decisions.   If COVID-19 is strongly suspected based on clinical and exposure   history, re-testing using an alternate molecular assay should be   considered.       This test is only for use under the Food and Drug   Administration s Emergency Use Authorization (EUA).   Commercial kits are provided by [employer].   Performance characteristics of the EUA have been independently  verified by [facility]                           ment of  Pathology and Laboratory Medicine.   _________________________________________________________________  The ID NOW COVID-19 Letter of Authorization, along with the   authorized Fact Sheet for Healthcare Providers, the authorized Fact  Sheet for Patients, and authorized labeling are available on the FDA   website:  www.fda.gov/MedicalDevices/Safety/EmergencySituations/exo501445.htm       CT Cervical Spine Without Contrast  Narrative: EXAMINATION:  CT CERVICAL SPINE WITHOUT CONTRAST    CLINICAL HISTORY:  Neck trauma (Age >= 65y);    TECHNIQUE:  Low dose axial images, sagittal and coronal reformations were performed though the cervical spine.  Contrast was not administered.    FINDINGS:  The visualized intracranial content is unremarkable.  The visualized extracranial soft tissues and vascular structures at the base of the skull to the visualized portion of the superior mediastinum are significant for bilateral carotid calcification.  There is a 1.7 cm hypodensity within the left lobe of the thyroid.  The visualized portion of the lungs is significant for bilateral apical pleural thickening and/or scar.  There is degenerative change throughout the  cervical spine.  There is no prevertebral soft tissue swelling.  There is no fracture, dislocation, or bony erosion.  Impression: No evidence of fracture.    Electronically signed by: Sven Luna MD  Date:    03/29/2022  Time:    15:59  CT Head Without Contrast  Narrative: EXAMINATION:  CT HEAD WITHOUT CONTRAST    CLINICAL HISTORY:  Head trauma, moderate-severe;    TECHNIQUE:  Low dose axial images were obtained through the head.  Coronal and sagittal reformations were also performed. Contrast was not administered.    FINDINGS:  The brain is significant for periventricular and subcortical hypoattenuation consistent microvascular ischemic change.  The ventricular system is enlarged consistent with involutional change.  There is no intra or extra-axial mass or hemorrhage identified.  The visualized extracranial structures are significant for a moderately sized frontal subcutaneous hematoma.  Impression: No acute intracranial abnormality.    Electronically signed by: Sven Luna MD  Date:    03/29/2022  Time:    15:54      ASSESSMENT/PLAN:  Problem List Items Addressed This Visit        Neuro    Cervical stenosis of spine    Overview     - 7/6/19 CT C-spine: multi-level disc narrowing. Grade 1 anteriolisthesis C4 on 5. Severe facet arthropathy throughout the C-spine with central canal stenosis at C3-4 and moderate to severe central canal narrowing.            Late onset Alzheimer's disease without behavioral disturbance - Primary    Overview     - diagnosed 2012 by Dr. Pedro Pablo Drake Psychiatry  - stable  - good family support  - followed by Psychiatry              Psychiatric    Depression    Overview     - diagnosed 2012 by Dr. Pedro Pablo Drake Psychaitry              Pulmonary    Pulmonary hypertension    Overview     - 3/12/19 Echo: PA pressure 41  MmHg  - medical management              Cardiac/Vascular    Aortic stenosis, moderate (Chronic)    Overview     - 3/12/19 Echo: Mild to moderate aortic stenosis, ROBIN = 0.8  cm2, mean gradient = 17 mmHg.   - asymptomatic  - declines surgical evaluation           Essential hypertension (Chronic)    Overview     - well controlled  - continue current medication              Renal/    Stage 3a chronic kidney disease    Overview     Lab Results   Component Value Date    CREATININE 1.05 03/03/2022   - 9/27/21 Renal US: No significant abnormality  - baseline creatinine 1-1.1  - baseline GFR 50 to 53  - improved back to baseline              Endocrine    Thyroid nodule    Overview     - 03/28/2022 CT cervical spine:  Incidental 1.7 cm hypodensity within the left lobe of the thyroid.  - exam benign  - continue to monitor              Orthopedic    Injury of face    Overview     - reviewed ER evaluation, notes, labs and imaging  - healing well  - supportive care  - counseled on fall prevention                 ORDERS:        Vaccines recommended:  For COVID vaccination    Follow-up in 6 months or sooner if any concerns.      This note is dictated using the M*Modal Fluency Direct word recognition program. There are word recognition mistakes that are occasionally missed on review.    Dr. Queta Badillo D.O.   Family Medicine

## 2022-03-29 NOTE — PROGRESS NOTES
Health Maintenance Due   Topic Date Due    Influenza Vaccine (1) 09/01/2021     Updates were requested from care everywhere.  Chart was reviewed for overdue Proactive [facility] Encounters ([facility]) topics (CRS, Breast Cancer Screening, Eye exam)  Health Maintenance has been updated.  LINKS immunization registry triggered.  Immunizations were reconciled.

## 2022-03-30 ENCOUNTER — OFFICE VISIT (OUTPATIENT)
Dept: PODIATRY | Facility: CLINIC | Age: 87
End: 2022-03-30
Payer: MEDICARE

## 2022-03-30 ENCOUNTER — OFFICE VISIT (OUTPATIENT)
Dept: FAMILY MEDICINE | Facility: CLINIC | Age: 87
End: 2022-03-30
Payer: MEDICARE

## 2022-03-30 VITALS
OXYGEN SATURATION: 97 % | HEIGHT: 61 IN | WEIGHT: 103.69 LBS | BODY MASS INDEX: 19.58 KG/M2 | DIASTOLIC BLOOD PRESSURE: 62 MMHG | SYSTOLIC BLOOD PRESSURE: 112 MMHG | HEIGHT: 61 IN | HEART RATE: 77 BPM | HEART RATE: 77 BPM | BODY MASS INDEX: 19.45 KG/M2 | RESPIRATION RATE: 18 BRPM | SYSTOLIC BLOOD PRESSURE: 112 MMHG | DIASTOLIC BLOOD PRESSURE: 62 MMHG | WEIGHT: 103 LBS | RESPIRATION RATE: 18 BRPM | OXYGEN SATURATION: 97 %

## 2022-03-30 DIAGNOSIS — G30.1 LATE ONSET ALZHEIMER'S DISEASE WITHOUT BEHAVIORAL DISTURBANCE: Primary | ICD-10-CM

## 2022-03-30 DIAGNOSIS — I10 ESSENTIAL HYPERTENSION: Chronic | ICD-10-CM

## 2022-03-30 DIAGNOSIS — F02.80 LATE ONSET ALZHEIMER'S DISEASE WITHOUT BEHAVIORAL DISTURBANCE: Primary | ICD-10-CM

## 2022-03-30 DIAGNOSIS — Q84.5 ENLARGED AND HYPERTROPHIC NAILS: ICD-10-CM

## 2022-03-30 DIAGNOSIS — I27.20 PULMONARY HYPERTENSION: ICD-10-CM

## 2022-03-30 DIAGNOSIS — B35.1 PAIN DUE TO ONYCHOMYCOSIS OF TOENAIL: Primary | ICD-10-CM

## 2022-03-30 DIAGNOSIS — N18.31 STAGE 3A CHRONIC KIDNEY DISEASE: ICD-10-CM

## 2022-03-30 DIAGNOSIS — I35.0 AORTIC STENOSIS, MODERATE: Chronic | ICD-10-CM

## 2022-03-30 DIAGNOSIS — F33.42 RECURRENT MAJOR DEPRESSIVE DISORDER, IN FULL REMISSION: ICD-10-CM

## 2022-03-30 DIAGNOSIS — M48.02 CERVICAL STENOSIS OF SPINE: ICD-10-CM

## 2022-03-30 DIAGNOSIS — S09.93XA FACIAL INJURY, INITIAL ENCOUNTER: ICD-10-CM

## 2022-03-30 DIAGNOSIS — R26.2 DIFFICULTY IN WALKING, NOT ELSEWHERE CLASSIFIED: ICD-10-CM

## 2022-03-30 DIAGNOSIS — E04.1 THYROID NODULE: ICD-10-CM

## 2022-03-30 DIAGNOSIS — B35.1 TINEA UNGUIUM: ICD-10-CM

## 2022-03-30 DIAGNOSIS — M79.676 PAIN DUE TO ONYCHOMYCOSIS OF TOENAIL: Primary | ICD-10-CM

## 2022-03-30 PROBLEM — I65.23 BILATERAL CAROTID ARTERY STENOSIS: Status: ACTIVE | Noted: 2022-03-30

## 2022-03-30 PROBLEM — I65.23 BILATERAL CAROTID ARTERY STENOSIS: Status: RESOLVED | Noted: 2022-03-30 | Resolved: 2022-03-30

## 2022-03-30 PROCEDURE — 11721 PR DEBRIDEMENT OF NAILS, 6 OR MORE: ICD-10-PCS | Mod: S$PBB,,, | Performed by: PODIATRIST

## 2022-03-30 PROCEDURE — 99999 PR PBB SHADOW E&M-EST. PATIENT-LVL III: CPT | Mod: PBBFAC,,, | Performed by: PODIATRIST

## 2022-03-30 PROCEDURE — 99214 OFFICE O/P EST MOD 30 MIN: CPT | Mod: S$PBB,,, | Performed by: FAMILY MEDICINE

## 2022-03-30 PROCEDURE — 11721 DEBRIDE NAIL 6 OR MORE: CPT | Mod: PBBFAC,PN | Performed by: PODIATRIST

## 2022-03-30 PROCEDURE — 11721 DEBRIDE NAIL 6 OR MORE: CPT | Mod: S$PBB,,, | Performed by: PODIATRIST

## 2022-03-30 PROCEDURE — 99999 PR PBB SHADOW E&M-EST. PATIENT-LVL III: ICD-10-PCS | Mod: PBBFAC,,, | Performed by: PODIATRIST

## 2022-03-30 PROCEDURE — 99213 OFFICE O/P EST LOW 20 MIN: CPT | Mod: 27,PBBFAC,PN | Performed by: FAMILY MEDICINE

## 2022-03-30 PROCEDURE — 99999 PR PBB SHADOW E&M-EST. PATIENT-LVL III: ICD-10-PCS | Mod: PBBFAC,,, | Performed by: FAMILY MEDICINE

## 2022-03-30 PROCEDURE — 99203 OFFICE O/P NEW LOW 30 MIN: CPT | Mod: 25,S$PBB,, | Performed by: PODIATRIST

## 2022-03-30 PROCEDURE — 99213 OFFICE O/P EST LOW 20 MIN: CPT | Mod: PBBFAC,27,PN | Performed by: PODIATRIST

## 2022-03-30 PROCEDURE — 99214 PR OFFICE/OUTPT VISIT, EST, LEVL IV, 30-39 MIN: ICD-10-PCS | Mod: S$PBB,,, | Performed by: FAMILY MEDICINE

## 2022-03-30 PROCEDURE — 99999 PR PBB SHADOW E&M-EST. PATIENT-LVL III: CPT | Mod: PBBFAC,,, | Performed by: FAMILY MEDICINE

## 2022-03-30 PROCEDURE — 99203 PR OFFICE/OUTPT VISIT, NEW, LEVL III, 30-44 MIN: ICD-10-PCS | Mod: 25,S$PBB,, | Performed by: PODIATRIST

## 2022-03-30 NOTE — PROGRESS NOTES
Subjective:      Patient ID: Lesa Holder is a 89 y.o. female.    Chief Complaint: Nail Care and PCP Dr. Badillo  (Last visit. 3/30/22)      Lesa AMEZCUA is a 89 y.o. female who presents to the clinic for evaluation and treatment of painful, elongated toenails times 10 Lesa AMEZCUA has a past medical history of Cataract (1998), Dementia, Depression, GERD (gastroesophageal reflux disease), and Hypertension. The patient's chief complaint is long, thick toenails.    PCP: Queta Badillo, DO    Date Last Seen by PCP:  In epic      Last encounter in this department: Visit date not found    Hemoglobin A1C   Date Value Ref Range Status   09/21/2021 5.6 4.0 - 5.6 % Final     Comment:     ADA Screening Guidelines:  5.7-6.4%  Consistent with prediabetes  >or=6.5%  Consistent with diabetes    High levels of fetal hemoglobin interfere with the HbA1C  assay. Heterozygous hemoglobin variants (HbS, HgC, etc)do  not significantly interfere with this assay.   However, presence of multiple variants may affect accuracy.         Review of Systems   Constitutional: Negative for chills, decreased appetite, fever and malaise/fatigue.   HENT: Negative for congestion, ear discharge and sore throat.    Eyes: Negative for discharge and pain.   Cardiovascular: Negative for chest pain, claudication and leg swelling.   Respiratory: Negative for cough and shortness of breath.    Skin: Positive for color change. Negative for nail changes and rash.   Musculoskeletal: Negative for arthritis, joint pain, joint swelling and muscle weakness.   Gastrointestinal: Negative for bloating, abdominal pain, diarrhea, nausea and vomiting.   Genitourinary: Negative for flank pain and hematuria.   Neurological: Negative for headaches, numbness and weakness.   Psychiatric/Behavioral: Negative for altered mental status.             Past Medical History:   Diagnosis Date    Cataract 1998    remove from left eye    Dementia     Depression     GERD (gastroesophageal reflux  "disease)     Hypertension        Past Surgical History:   Procedure Laterality Date    HYSTERECTOMY         Family History   Problem Relation Age of Onset    Cancer Daughter         Lung    Heart disease Neg Hx        Social History     Socioeconomic History    Marital status:    Tobacco Use    Smoking status: Never Smoker    Smokeless tobacco: Never Used   Substance and Sexual Activity    Alcohol use: No    Drug use: No    Sexual activity: Not Currently   Social History Narrative    Lives her . 4 adult children (1 daughter passed, 2 sons passed, 1 son living [place]). 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 Denies alcohol, smoking or illicit drugs.        Current Outpatient Medications   Medication Sig Dispense Refill    donepezil 23 mg Tab Take 1 tablet (23 mg total) by mouth once daily. 90 tablet 1    escitalopram oxalate (LEXAPRO) 20 MG tablet TAKE 1 TABLET(20 MG) BY MOUTH EVERY DAY 90 tablet 0    multivitamin capsule Take 1 capsule by mouth once daily.      pantoprazole (PROTONIX) 40 MG tablet Take 1 tablet (40 mg total) by mouth once daily. 90 tablet 3     No current facility-administered medications for this visit.       Review of patient's allergies indicates:  No Known Allergies    Vitals:    03/30/22 1127   BP: 112/62   Pulse: 77   Resp: 18   SpO2: 97%   Weight: 46.7 kg (103 lb)   Height: 5' 1" (1.549 m)   PainSc: 0-No pain       Objective:      Physical Exam  Constitutional:       General: She is not in acute distress.     Appearance: She is well-developed.   HENT:      Nose: Nose normal.   Eyes:      Conjunctiva/sclera: Conjunctivae normal.   Pulmonary:      Effort: Pulmonary effort is normal.   Chest:      Chest wall: No tenderness.   Abdominal:      Tenderness: There is no abdominal tenderness.   Musculoskeletal:      Cervical back: Normal range of motion.   Neurological:      Mental Status: She is alert and oriented to person, place, and " time.   Psychiatric:         Behavior: Behavior normal.         Vascular: Distal DP/PT pulses palpable 1/4. CRT < 3 sec to tips of toes. No vericosities noted to LEs. warm to touch LE, No edema noted to LE.    Dermatologic: No open lesions, lacerations or wounds. Interdigital spaces clean, dry and intact. No erythema, rubor, calor noted LE  Toenails 1-5 bilaterally are elongated by 2-3 mm, thickened by 2-3 mm, discolored/yellowed, dystrophic, brittle with subungual debris. No incurvation. Mild xerosis noted with some skin pigmentation changes.     Musculoskeletal: MMT 4/5 in DF/PF/Inv/Ev resistance with no reproduction of pain in any direction. Passive range of motion of ankle and pedal joints is painless. No calf tenderness LE, Compartments soft/compressible.     Neurological: Light touch, proprioception, and sharp/dull sensation are all intact. Protective threshold with the [place]-Wienstein monofilament is intact. Vibratory sensation intact.         Assessment:       Encounter Diagnoses   Name Primary?    Enlarged and hypertrophic nails     Pain due to onychomycosis of toenail Yes    Tinea unguium     Difficulty in walking, not elsewhere classified          Plan:       Lesa AMEZCUA was seen today for nail care and pcp dr. yang .    Diagnoses and all orders for this visit:    Pain due to onychomycosis of toenail    Enlarged and hypertrophic nails  -     Ambulatory referral/consult to Podiatry    Tinea unguium    Difficulty in walking, not elsewhere classified      I counseled the patient on her conditions, their implications and medical management.    89 y.o. female with painful, mycotic, elongated nails times 10.     -nails times 10 sharply debrided with nail Nipper.  Patient tolerated well.    -I reviewed imaging, clinical history, and diagnosis as above with the patient. I attempted to use layman's terms to educate the patient as well as utilize foot models and/or pictures. I personally went through imaging with  the patient.    -It was discussed the importance of wearing shoes with adequate room in toe box to accommodate toe deformities. Recommended New Balance/Asics shoe brands with adequate arch supports to alleviate abnormal pressure and improve stability of foot while walking. Avoid flat shoes and barefoot walking as these will exacerbate or worsen symptoms.   -The nature of the condition, options for management, as well as potential risks and complications were discussed in detail with patient. Patient was amenable to my recommendations and left my office fully informed and will follow up as instructed or sooner if necessary.    -Patient was advised of signs and symptoms of infection including redness, drainage, purulence, odor, streaking, fever, chills and I advised patient to seek medical attention (ER or urgent care) if these symptoms arise.   -f/u prn     Note dictated with voice recognition software, please excuse any grammatical errors.

## 2022-03-30 NOTE — PATIENT INSTRUCTIONS
Covid-19 vaccination scheduling  You can schedule on your MyChart  Go to Visits  Schedule visit  Scroll to the bottom and select Covid-19 vaccine/booster  Answer questions  Select location   You can also call [phone]  The Moderna vaccine is available at any [facility] pharmacy for walk in  Local pharmacies also have the booster available

## 2022-04-14 ENCOUNTER — TELEPHONE (OUTPATIENT)
Dept: FAMILY MEDICINE | Facility: CLINIC | Age: 87
End: 2022-04-14
Payer: MEDICARE

## 2022-04-14 NOTE — TELEPHONE ENCOUNTER
Spoke with Edyta of home health. Patient and  will not let hh into home. HH has tried to visit for past month.  Refuses care.   forceful and irate.    Was able to get vitals.  Patient signed discharge form.

## 2022-04-14 NOTE — TELEPHONE ENCOUNTER
----- Message from Suzette Fu sent at 4/14/2022  3:07 PM CDT -----  Needs advice from nurse:      Who Called:Bang Renteria  Regarding:states patient and  does not want home health/ will not let them in house  Would the patient rather a call back or VIA [facility]?  Best Call Back number:  Additional Info:

## 2022-04-14 NOTE — TELEPHONE ENCOUNTER
----- Message from Rosa Elena Lerner sent at 4/14/2022  2:00 PM CDT -----  Contact: [phone]/pt  Type:  Needs Medical Advice    Who Called: Edyta with Home Health  Symptoms (please be specific): Pt refuse service and was released by home health 4/14/2020  Would the patient rather a call back or a response via [facility]? Call Back  Best Call Back Number: [phone] Edyta with Home Health  Additional Information: n/a

## 2022-04-20 DIAGNOSIS — K21.9 GASTROESOPHAGEAL REFLUX DISEASE WITHOUT ESOPHAGITIS: ICD-10-CM

## 2022-04-20 RX ORDER — PANTOPRAZOLE SODIUM 40 MG/1
TABLET, DELAYED RELEASE ORAL
Qty: 90 TABLET | Refills: 3 | Status: SHIPPED | OUTPATIENT
Start: 2022-04-20

## 2022-04-20 NOTE — TELEPHONE ENCOUNTER
Refill Authorization Note   Lesa AMEZCUA Glory  is requesting a refill authorization.  Brief Assessment and Rationale for Refill:  Approve     Medication Therapy Plan:       Medication Reconciliation Completed: No   Comments:     No Care Gaps recommended.     Note composed:10:07 AM 04/20/2022

## 2022-04-20 NOTE — TELEPHONE ENCOUNTER
No new care gaps identified.  Powered by [employer]. Reference number: [id number].   4/20/2022 5:39:07 AM CDT

## 2022-04-28 ENCOUNTER — DOCUMENT SCAN (OUTPATIENT)
Dept: HOME HEALTH SERVICES | Facility: HOSPITAL | Age: 87
End: 2022-04-28
Payer: MEDICARE

## 2022-06-27 ENCOUNTER — TELEPHONE (OUTPATIENT)
Dept: FAMILY MEDICINE | Facility: CLINIC | Age: 87
End: 2022-06-27
Payer: MEDICARE

## 2022-06-27 PROBLEM — G30.9 ALZHEIMER'S DEMENTIA: Status: ACTIVE | Noted: 2022-06-27

## 2022-06-27 PROBLEM — F02.80 ALZHEIMER'S DEMENTIA: Status: ACTIVE | Noted: 2022-06-27

## 2022-06-27 PROBLEM — R41.0 DELIRIUM: Status: ACTIVE | Noted: 2022-06-27

## 2022-06-27 NOTE — TELEPHONE ENCOUNTER
----- Message from Gio Munoz sent at 6/27/2022  2:30 PM CDT -----  Regarding: Appt Access  Contact: [phone]  Request Appt Access    Who Called: Allegra Mcgill(Granddaughter)  Appt Type:Hospital F/U  Call Back Number:[phone]  Additional Information: The patient granddaughter called to schedule a hospital f/u. The patient discharge date was 06/27/2022, and patient was told to follow within 7days.

## 2022-06-28 PROCEDURE — G0180 PR HOME HEALTH MD CERTIFICATION: ICD-10-PCS | Mod: ,,, | Performed by: INTERNAL MEDICINE

## 2022-06-28 PROCEDURE — G0180 MD CERTIFICATION HHA PATIENT: HCPCS | Mod: ,,, | Performed by: INTERNAL MEDICINE

## 2022-07-07 ENCOUNTER — HOSPITAL ENCOUNTER (EMERGENCY)
Facility: HOSPITAL | Age: 87
Discharge: PSYCHIATRIC OR SUBSTANCE USE FACILITY | End: 2022-07-07
Attending: EMERGENCY MEDICINE
Payer: OTHER MISCELLANEOUS

## 2022-07-07 VITALS
WEIGHT: 105 LBS | DIASTOLIC BLOOD PRESSURE: 73 MMHG | HEIGHT: 60 IN | HEART RATE: 95 BPM | BODY MASS INDEX: 20.62 KG/M2 | TEMPERATURE: 98 F | RESPIRATION RATE: 16 BRPM | SYSTOLIC BLOOD PRESSURE: 144 MMHG | OXYGEN SATURATION: 100 %
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
## 2022-07-07 LAB
ALBUMIN SERPL BCP-MCNC: 2.9 G/DL (ref 3.5–5.2)
ALP SERPL-CCNC: 67 U/L (ref 55–135)
ALT SERPL W/O P-5'-P-CCNC: 14 U/L (ref 10–44)
ANION GAP SERPL CALC-SCNC: 8 MMOL/L (ref 8–16)
AST SERPL-CCNC: 19 U/L (ref 10–40)
BACTERIA #/AREA URNS HPF: NORMAL /HPF
BASOPHILS # BLD AUTO: 0.03 K/UL (ref 0–0.2)
BASOPHILS NFR BLD: 0.5 % (ref 0–1.9)
BILIRUB SERPL-MCNC: 0.3 MG/DL (ref 0.1–1)
BILIRUB UR QL STRIP: NEGATIVE
BNP SERPL-MCNC: 119 PG/ML (ref 0–99)
BUN SERPL-MCNC: 29 MG/DL (ref 10–30)
CALCIUM SERPL-MCNC: 8.8 MG/DL (ref 8.7–10.5)
CHLORIDE SERPL-SCNC: 111 MMOL/L (ref 95–110)
CLARITY UR: CLEAR
CO2 SERPL-SCNC: 27 MMOL/L (ref 23–29)
COLOR UR: YELLOW
CREAT SERPL-MCNC: 1.1 MG/DL (ref 0.5–1.4)
DIFFERENTIAL METHOD: ABNORMAL
EOSINOPHIL # BLD AUTO: 0.1 K/UL (ref 0–0.5)
EOSINOPHIL NFR BLD: 1.3 % (ref 0–8)
ERYTHROCYTE [DISTWIDTH] IN BLOOD BY AUTOMATED COUNT: 14 % (ref 11.5–14.5)
EST. GFR  (AFRICAN AMERICAN): 50 ML/MIN/1.73 M^2
EST. GFR  (NON AFRICAN AMERICAN): 43 ML/MIN/1.73 M^2
GLUCOSE SERPL-MCNC: 79 MG/DL (ref 70–110)
GLUCOSE UR QL STRIP: NEGATIVE
HCT VFR BLD AUTO: 35.2 % (ref 37–48.5)
HGB BLD-MCNC: 11.5 G/DL (ref 12–16)
HGB UR QL STRIP: NEGATIVE
HYALINE CASTS #/AREA URNS LPF: 0 /LPF
IMM GRANULOCYTES # BLD AUTO: 0.01 K/UL (ref 0–0.04)
IMM GRANULOCYTES NFR BLD AUTO: 0.2 % (ref 0–0.5)
KETONES UR QL STRIP: NEGATIVE
LEUKOCYTE ESTERASE UR QL STRIP: ABNORMAL
LYMPHOCYTES # BLD AUTO: 1.6 K/UL (ref 1–4.8)
LYMPHOCYTES NFR BLD: 25.9 % (ref 18–48)
MCH RBC QN AUTO: 29.9 PG (ref 27–31)
MCHC RBC AUTO-ENTMCNC: 32.7 G/DL (ref 32–36)
MCV RBC AUTO: 91 FL (ref 82–98)
MICROSCOPIC COMMENT: NORMAL
MONOCYTES # BLD AUTO: 0.6 K/UL (ref 0.3–1)
MONOCYTES NFR BLD: 9.1 % (ref 4–15)
NEUTROPHILS # BLD AUTO: 3.9 K/UL (ref 1.8–7.7)
NEUTROPHILS NFR BLD: 63 % (ref 38–73)
NITRITE UR QL STRIP: NEGATIVE
NRBC BLD-RTO: 0 /100 WBC
PH UR STRIP: 6 [PH] (ref 5–8)
PLATELET # BLD AUTO: 192 K/UL (ref 150–450)
PMV BLD AUTO: 9.1 FL (ref 9.2–12.9)
POTASSIUM SERPL-SCNC: 3.1 MMOL/L (ref 3.5–5.1)
PROT SERPL-MCNC: 6.7 G/DL (ref 6–8.4)
PROT UR QL STRIP: ABNORMAL
RBC # BLD AUTO: 3.85 M/UL (ref 4–5.4)
RBC #/AREA URNS HPF: 3 /HPF (ref 0–4)
SODIUM SERPL-SCNC: 146 MMOL/L (ref 136–145)
SP GR UR STRIP: >1.03 (ref 1–1.03)
SQUAMOUS #/AREA URNS HPF: 2 /HPF
TROPONIN I SERPL DL<=0.01 NG/ML-MCNC: 0.02 NG/ML (ref 0–0.03)
TROPONIN I SERPL DL<=0.01 NG/ML-MCNC: 0.03 NG/ML (ref 0–0.03)
URN SPEC COLLECT METH UR: ABNORMAL
UROBILINOGEN UR STRIP-ACNC: NEGATIVE EU/DL
WBC # BLD AUTO: 6.15 K/UL (ref 3.9–12.7)
WBC #/AREA URNS HPF: 3 /HPF (ref 0–5)

## 2022-07-07 PROCEDURE — 25000003 PHARM REV CODE 250: Performed by: EMERGENCY MEDICINE

## 2022-07-07 PROCEDURE — 93005 ELECTROCARDIOGRAM TRACING: CPT

## 2022-07-07 PROCEDURE — 93010 EKG 12-LEAD: ICD-10-PCS | Mod: ,,, | Performed by: INTERNAL MEDICINE

## 2022-07-07 PROCEDURE — 80053 COMPREHEN METABOLIC PANEL: CPT | Performed by: EMERGENCY MEDICINE

## 2022-07-07 PROCEDURE — 83880 ASSAY OF NATRIURETIC PEPTIDE: CPT | Performed by: EMERGENCY MEDICINE

## 2022-07-07 PROCEDURE — 99285 EMERGENCY DEPT VISIT HI MDM: CPT | Mod: 25

## 2022-07-07 PROCEDURE — 84484 ASSAY OF TROPONIN QUANT: CPT | Mod: 91 | Performed by: EMERGENCY MEDICINE

## 2022-07-07 PROCEDURE — 85025 COMPLETE CBC W/AUTO DIFF WBC: CPT | Performed by: EMERGENCY MEDICINE

## 2022-07-07 PROCEDURE — 93010 ELECTROCARDIOGRAM REPORT: CPT | Mod: ,,, | Performed by: INTERNAL MEDICINE

## 2022-07-07 PROCEDURE — 81000 URINALYSIS NONAUTO W/SCOPE: CPT | Performed by: EMERGENCY MEDICINE

## 2022-07-07 RX ORDER — POTASSIUM CHLORIDE 20 MEQ/1
40 TABLET, EXTENDED RELEASE ORAL
Status: DISCONTINUED | OUTPATIENT
Start: 2022-07-07 | End: 2022-07-07

## 2022-07-07 RX ADMIN — POTASSIUM BICARBONATE 25 MEQ: 977.5 TABLET, EFFERVESCENT ORAL at 11:07

## 2022-07-07 NOTE — ED PROVIDER NOTES
"Encounter Date: 7/7/2022    SCRIBE #1 NOTE: I, Melany Hopper, am scribing for, and in the presence of,  Ty Eli MD. I have scribed the following portions of the note - Other sections scribed: HPI, ROS.       History     Chief Complaint   Patient presents with    Fall     Ems called to 89yo female that had unwitnessed fall. Stated she tripped and fell on her butt. Denied any LOC or hitting her head. Denied any new pain.      CC: Fall    HPI: This is a 89 y.o. F who has Dementia, GERD, and HTN who presents to the via EMS transportation from [facility] on [address] for emergent evaluation of unwitnessed fall PTA. Pt reports feeling "sick in the stomach, and weak" causing her to fall this morning. Pt states that she did not eat dinner last night, and she did not eat breakfast this morning. She is currently hungry and would like to eat a meal. She recalls hitting her head during the fall. She states that she has been feeling fine the last couple of days and she has not been feeling weaker than usual in the last couple of days. Pt denies syncope, or vision changes. She has a CEC in place.    The history is provided by the patient and the EMS personnel. No  was used.     Review of patient's allergies indicates:  No Known Allergies  Past Medical History:   Diagnosis Date    Cataract 1998    remove from left eye    Dementia     Depression     GERD (gastroesophageal reflux disease)     Hypertension      Past Surgical History:   Procedure Laterality Date    HYSTERECTOMY       Family History   Problem Relation Age of Onset    Cancer Daughter         Lung    Heart disease Neg Hx      Social History     Tobacco Use    Smoking status: Never Smoker    Smokeless tobacco: Never Used   Substance Use Topics    Alcohol use: No    Drug use: No     Review of Systems   Constitutional: Negative for fever.   HENT: Negative for sore throat.    Eyes: Negative for visual disturbance.   Respiratory: " Negative for shortness of breath.    Cardiovascular: Negative for chest pain.   Gastrointestinal: Positive for nausea. Negative for abdominal pain, diarrhea and vomiting.   Genitourinary: Negative for dysuria.   Musculoskeletal: Negative for back pain.   Skin: Negative for rash.   Neurological: Positive for weakness (generalized). Negative for syncope.   Hematological: Does not bruise/bleed easily.       Physical Exam     Initial Vitals [07/07/22 0816]   BP Pulse Resp Temp SpO2   (!) 153/67 71 16 98.1 °F (36.7 °C) 100 %      MAP       --         Physical Exam    Nursing note and vitals reviewed.  Constitutional: She appears well-developed and well-nourished. She is not diaphoretic. No distress.   HENT:   Head: Normocephalic.   Nose: Nose normal.   Small contusion noted to forehead.   Eyes: EOM are normal. Pupils are equal, round, and reactive to light.   Neck: Neck supple. No JVD present.   Normal range of motion.  Cardiovascular: Normal rate, regular rhythm, normal heart sounds and intact distal pulses.   Pulmonary/Chest: Breath sounds normal. No stridor. No respiratory distress. She has no wheezes. She has no rales.   Abdominal: Abdomen is soft. Bowel sounds are normal. She exhibits no distension. There is no abdominal tenderness.   Musculoskeletal:         General: No tenderness or edema. Normal range of motion.      Cervical back: Normal range of motion and neck supple.     Neurological: She is alert and oriented to person, place, and time. She has normal strength.   Skin: Skin is warm and dry. Capillary refill takes less than 2 seconds. No rash noted. No erythema.         ED Course   Procedures  Labs Reviewed   CBC W/ AUTO DIFFERENTIAL - Abnormal; Notable for the following components:       Result Value    RBC 3.85 (*)     Hemoglobin 11.5 (*)     Hematocrit 35.2 (*)     MPV 9.1 (*)     All other components within normal limits   TROPONIN I - Abnormal; Notable for the following components:    Troponin I 0.030  (*)     All other components within normal limits   B-TYPE NATRIURETIC PEPTIDE - Abnormal; Notable for the following components:     (*)     All other components within normal limits   URINALYSIS, REFLEX TO URINE CULTURE - Abnormal; Notable for the following components:    Specific Gravity, UA >1.030 (*)     Protein, UA 1+ (*)     Leukocytes, UA 1+ (*)     All other components within normal limits    Narrative:     Specimen Source->Urine   COMPREHENSIVE METABOLIC PANEL - Abnormal; Notable for the following components:    Sodium 146 (*)     Potassium 3.1 (*)     Chloride 111 (*)     Albumin 2.9 (*)     eGFR if  50 (*)     eGFR if non  43 (*)     All other components within normal limits   TROPONIN I   URINALYSIS MICROSCOPIC    Narrative:     Specimen Source->Urine        ECG Results          EKG 12-lead (Final result)  Result time 07/07/22 19:33:03    Final result by Interface, Lab In [facility] (07/07/22 19:33:03)                 Narrative:    Test Reason : W19.XXXA,    Vent. Rate : 068 BPM     Atrial Rate : 068 BPM     P-R Int : 160 ms          QRS Dur : 126 ms      QT Int : 422 ms       P-R-T Axes : 057 049 034 degrees     QTc Int : 448 ms    Normal sinus rhythm  Nonspecific intraventricular block  Abnormal ECG  When compared with ECG of 04-JUL-2022 12:34,  Significant changes have occurred  Confirmed by Eduardo Archibald MD ([id number]) on 7/7/2022 7:32:51 PM    Referred By:             Confirmed By:Eduardo Archibald MD                            Imaging Results          CT Head Without Contrast (Final result)  Result time 07/07/22 10:57:04    Final result by Luis Enrique Gonzalez MD (07/07/22 10:57:04)                 Impression:      No CT evidence of acute intracranial abnormality.    Generalized cerebral volume loss and chronic microvascular ischemic changes.      Electronically signed by: Luis Enrique Gonzalez MD  Date:    07/07/2022  Time:    10:57             Narrative:    EXAMINATION:  CT HEAD  WITHOUT CONTRAST    CLINICAL HISTORY:  Head trauma, minor (Age >= 65y);    TECHNIQUE:  Low dose axial images were obtained through the head.  Coronal and sagittal reformations were also performed. Contrast was not administered.    COMPARISON:  06/26/2022.    FINDINGS:  Generalized cerebral volume loss with ex vacuo dilation of the ventricles and sulci.  Patchy and confluent periventricular white matter hypoattenuation suggestive of chronic microvascular ischemic change.    No evidence of acute territorial infarct, hemorrhage, mass effect, or midline shift.    No evidence of hydrocephalus.  Ventricles appear similar to the prior study.    Mild frontal scalp soft tissue edema.  No displaced calvarial fracture.    Visualized paranasal sinuses and mastoid air cells are essentially clear.                               CT Cervical Spine Without Contrast (Final result)  Result time 07/07/22 11:07:26    Final result by Luis Enrique Gonzalez MD (07/07/22 11:07:26)                 Impression:      No acute cervical fracture.    Moderate to advanced cervical spondylosis.    Left thyroid nodule measuring 1.9 cm in size.  Suggest further evaluation with thyroid ultrasound if not previously performed.      Electronically signed by: Luis Enrique Gonzalez MD  Date:    07/07/2022  Time:    11:07             Narrative:    EXAMINATION:  CT CERVICAL SPINE WITHOUT CONTRAST    CLINICAL HISTORY:  Neck trauma (Age >= 65y);    TECHNIQUE:  Low dose axial images, sagittal and coronal reformations were performed though the cervical spine.  Contrast was not administered.    COMPARISON:  03/29/2022.    FINDINGS:    Normal curvature and alignment.  Vertebral body heights are well maintained.  Moderate to advanced multilevel degenerative changes overall similar to the prior study.  Probable central canal stenosis and neural foraminal narrowing at C3-C4 similar to prior.  No acute fracture identified.  Prevertebral soft tissues are normal.  Lung apices are  "clear.  Hypoattenuating left thyroid nodule measuring approximately 1.9 cm in size.                               X-Ray Chest 1 View (Final result)  Result time 07/07/22 10:29:39    Final result by Luis Enrique Gonzalez MD (07/07/22 10:29:39)                 Impression:      No detrimental change when compared with 06/26/2022.      Electronically signed by: Luis Enrique Gonzalez MD  Date:    07/07/2022  Time:    10:29             Narrative:    EXAMINATION:  XR CHEST 1 VIEW    CLINICAL HISTORY:  Provided history is "  Unspecified fall, initial encounter".    TECHNIQUE:  One view of the chest.    COMPARISON:  06/26/2022.    FINDINGS:  Cardiomediastinal silhouette is stable in size.  Atherosclerotic calcifications overlie the aortic arch.  There are coarsened bibasilar interstitial lung markings but no large focal area of consolidation.  No large pleural effusion.  No pneumothorax.                                 Medications   potassium bicarbonate disintegrating tablet 25 mEq (25 mEq Oral Given 7/7/22 1157)           MDM:    89-year-old female with past medical history as noted above presenting with fall.  Upon further review of chart and presentation white possibly could have been a syncopal episode, was unwitnessed, patient is unable to recall what exactly she tripped or fell from.  EKG shows a normal sinus rhythm rate of 68 beats per minute, nonspecific intraventricular block, QTC 4 for EMS, similar to prior EKG, no STEMI.  CT head and cervical spine unremarkable, troponin 0.030/0.023, , CMP with potassium 3.1, UA unremarkable.  Patient presentation consistent with fall with small contusion noted on exam as above, patient is otherwise completely baseline, appropriate, in no acute distress with stable vitals.  Troponin slightly elevated without any EKG findings are concerning reports of chest pain with a stabilization after repeat troponin was drawn consistent with her prior mildly elevated troponins in the past.  Do " not suspect this represents acute mi or ACS or further cardiac abnormality.  Discussed diagnosis and further treatment with patient, including f/u as needed.  Patient discharged back to [facility] for further care in stable and improving condition.            Scribe Attestation:   Scribe #1: I performed the above scribed service and the documentation accurately describes the services I performed. I attest to the accuracy of the note.               I, Ty Eli M.D., personally performed the services described in this documentation. All medical record entries made by the scribe were at my direction and in my presence.  I have reviewed the chart and agree that the record reflects my personal performance and is accurate and complete.  Clinical Impression:   Final diagnoses:  [W19.XXXA] Fall (Primary)          ED Disposition Condition    Discharge Stable        ED Prescriptions     None        Follow-up Information     Follow up With Specialties Details Why Contact Info    [facility] Emergency Dept Emergency Medicine Go to  If symptoms worsen [address] [facility] [address]  [phone]    Queta Badillo,  Family Medicine, Internal Medicine Schedule an appointment as soon as possible for a visit   [address]   [address]   [facility] [address]  [phone]             Ty Eli MD  07/08/22 0718

## 2022-07-07 NOTE — ED NOTES
Pt sitting up in bed eating lunch.  ED tech sitting at bedside for 1:1 patient observation.  Pt has no complaints at this time.  VSS.

## 2022-07-07 NOTE — ED NOTES
Patient discharged with hospitals transport company to [facility].  Discharge instructions given to hospitals personnel to take with patient.  Pt was ambulatory at time of discharge, VSS.

## 2022-07-07 NOTE — ED NOTES
Patient was transferred back to [facility]. Patient escorted out by security with 1 patient's belongings bag.

## 2022-07-07 NOTE — ED TRIAGE NOTES
Patient arrived to ED via EMS from [facility] on [address].  EMS reports pt had unwitnessed fall this morning, denies any pain or deformities from the fall.  Patient states she slipped and landed on her butt, she is ambulatory after the fall with her baseline gait.  Pt has dementia but is answering questions appropriately at this time.  Pt has CEC in place.

## 2022-07-13 ENCOUNTER — EXTERNAL HOME HEALTH (OUTPATIENT)
Dept: HOME HEALTH SERVICES | Facility: HOSPITAL | Age: 87
End: 2022-07-13
Payer: MEDICARE

## 2022-08-23 PROBLEM — E87.0 HYPERNATREMIA: Status: ACTIVE | Noted: 2022-08-23

## 2022-08-23 PROBLEM — D64.9 NORMOCYTIC ANEMIA: Status: ACTIVE | Noted: 2022-08-23

## 2022-08-23 PROBLEM — R79.89 ELEVATED TROPONIN: Status: RESOLVED | Noted: 2017-11-24 | Resolved: 2022-08-23

## 2022-08-23 PROBLEM — S09.93XA INJURY OF FACE: Status: RESOLVED | Noted: 2022-03-30 | Resolved: 2022-08-23

## 2022-08-23 PROBLEM — E88.09 HYPOALBUMINEMIA: Status: ACTIVE | Noted: 2022-08-23

## 2023-07-26 ENCOUNTER — HOSPITAL ENCOUNTER (EMERGENCY)
Facility: HOSPITAL | Age: 88
Discharge: HOME | End: 2023-07-26
Attending: EMERGENCY MEDICINE
Payer: MEDICARE

## 2023-07-26 VITALS
DIASTOLIC BLOOD PRESSURE: 57 MMHG | SYSTOLIC BLOOD PRESSURE: 118 MMHG | TEMPERATURE: 98 F | OXYGEN SATURATION: 100 % | RESPIRATION RATE: 18 BRPM | HEART RATE: 60 BPM

## 2023-07-26 DIAGNOSIS — S40.011A CONTUSION OF RIGHT SHOULDER, INITIAL ENCOUNTER: ICD-10-CM

## 2023-07-26 DIAGNOSIS — S09.90XA CLOSED HEAD INJURY, INITIAL ENCOUNTER: Primary | ICD-10-CM

## 2023-07-26 DIAGNOSIS — S01.01XA LACERATION OF SCALP, INITIAL ENCOUNTER: ICD-10-CM

## 2023-07-26 PROCEDURE — 12001 RPR S/N/AX/GEN/TRNK 2.5CM/<: CPT

## 2023-07-26 PROCEDURE — 99284 EMERGENCY DEPT VISIT MOD MDM: CPT | Mod: 25

## 2023-07-26 PROCEDURE — 25000003 PHARM REV CODE 250: Performed by: EMERGENCY MEDICINE

## 2023-07-26 RX ORDER — ACETAMINOPHEN 500 MG
1000 TABLET ORAL
Status: COMPLETED | OUTPATIENT
Start: 2023-07-26 | End: 2023-07-26

## 2023-07-26 RX ADMIN — ACETAMINOPHEN 1000 MG: 500 TABLET ORAL at 08:07

## 2023-07-26 NOTE — ED NOTES
"Received from CT. Pt alert & oriented to name, birthday. Disoriented to date. Pt's grandaughter at bedside & reports pt mental status is at baseline (hx of dementia). C/o right side headache- unable to rate. Pt states "it's just a headache that's all I can tell you". Pt ambulates independently at baseline, lives in nursing home. Reported multiple falls recently. Pt doesn't remember what happened    Patient identifiers verified and correct for debora cueva  LOC: The patient is awake, alert and oriented.  Appropriate affect; answers questions appropriately  APPEARANCE: Patient appears comfortable and in no acute distress, patient is clean and well groomed.  SKIN: The skin is warm and dry, color consistent with ethnicity, patient has normal skin turgor and moist mucus membranes, skin intact, pt denies breakdown or bruising  MUSCULOSKELETAL: Patient moving all extremities spontaneously, no edema noted.moves independently in bed  RESPIRATORY: Airway is open and patent, respirations are spontaneous, patient has a normal effort and rate, no accessory muscle use noted, pt placed on continuous pulse ox with O2 sats noted at 97% on room air  CARDIAC: pt placed on cardiac monitor. Denies chest pain. Sinus bradycardia noted on monitor  GASTRO: soft and non-tender to palpation. Denies n/v/d/abd pain  : Pt denies any pain or frequency with urination.external female catheter placed  NEURO: Pt opens eyes spontaneously, behavior appropriate to situation, follows commands, facial expression symmetrical, bilateral hand grasp equal and even, purposeful motor response noted,clear speech noted.    "

## 2023-07-26 NOTE — DISCHARGE INSTRUCTIONS
Follow-up in 5-7 days for staple removal     If intractable vomiting or any new or concerning symptoms occur, return to the emergency department     I am not concerned for a surgical clavicle fracture on the right based on examination, but take acetaminophen 1000 milligrams every 6 hours as needed for pain     Return to the emergency department if any new or concerning symptoms occur

## 2023-07-26 NOTE — ED PROVIDER NOTES
Encounter Date: 7/26/2023       History     Chief Complaint   Patient presents with    Fall    Laceration     Pt arrives via EMS from [facility] after falling and having laceration to right side of face.     89-year-old female with history of GERD, hypertension, dementia, depression, prior cataract surgery.  Patient fell off her bed at nursing home and struck her right occipital parietal region.  She did not experience loss of consciousness.  She is not vomited.  They sent her to the emergency department as they noticed bleeding to her scalp.  Patient complains of pain when I palpate the laceration but is otherwise without complaint.  She denies numbness or weakness.  Denies vision changes.  She denies any other areas of pain.    Review of patient's allergies indicates:  No Known Allergies  Past Medical History:   Diagnosis Date    Cataract 1998    remove from left eye    Dementia     Depression     GERD (gastroesophageal reflux disease)     Hypertension      Past Surgical History:   Procedure Laterality Date    HYSTERECTOMY       Family History   Problem Relation Age of Onset    Cancer Daughter         Lung    Heart disease Neg Hx      Social History     Tobacco Use    Smoking status: Never    Smokeless tobacco: Never   Substance Use Topics    Alcohol use: No    Drug use: No     Review of Systems  All other systems reviewed and negative except as noted in HPI    Physical Exam     Initial Vitals [07/26/23 0601]   BP Pulse Resp Temp SpO2   (!) 120/53 66 20 98 °F (36.7 °C) 97 %      MAP       --         Physical Exam    Nursing note and vitals reviewed.  Constitutional: She appears well-developed and well-nourished.   HENT:   Bleeding noted to patient's hair.  However, no discrete laceration noted.  One significant abrasion noted to right occipital parietal region.  No significant underlying cephalohematoma   Eyes: Conjunctivae and EOM are normal. Pupils are equal, round, and reactive to light.   Neck: Neck  supple.   Normal range of motion.  Cardiovascular:  Normal rate, regular rhythm, normal heart sounds and intact distal pulses.           Pulmonary/Chest: Breath sounds normal.   Abdominal: Abdomen is soft. Bowel sounds are normal.   Musculoskeletal:         General: No tenderness or edema. Normal range of motion.      Cervical back: Normal range of motion and neck supple.     Neurological: She is alert and oriented to person, place, and time. She has normal strength and normal reflexes. GCS score is 15. GCS eye subscore is 4. GCS verbal subscore is 5. GCS motor subscore is 6.   Skin: Skin is warm and dry. Capillary refill takes less than 2 seconds.       ED Course   Lac Repair    Date/Time: 7/26/2023 8:09 AM  Performed by: Obi Spencer MD  Authorized by: Obi Spencer MD     Consent:     Consent obtained:  Verbal    Consent given by: family member.    Risks, benefits, and alternatives were discussed: yes      Risks discussed:  Infection, poor cosmetic result, retained foreign body, pain and need for additional repair    Alternatives discussed:  No treatment  Universal protocol:     Procedure explained and questions answered to patient or proxy's satisfaction: yes      Patient identity confirmed:  Arm band and hospital-assigned identification number  Anesthesia:     Anesthesia method:  None  Laceration details:     Location:  Scalp    Scalp location:  Occipital    Length (cm):  1    Depth (mm):  3  Pre-procedure details:     Preparation:  Patient was prepped and draped in usual sterile fashion and imaging obtained to evaluate for foreign bodies  Exploration:     Limited defect created (wound extended): no      Hemostasis achieved with:  Direct pressure    Imaging obtained comment:  CT    Wound exploration: wound explored through full range of motion and entire depth of wound visualized      Wound extent: no fascia violation noted and no underlying fracture noted      Contaminated: no    Treatment:      Area cleansed with:  Saline    Amount of cleaning:  Standard    Irrigation solution:  Sterile saline    Irrigation volume:  60    Irrigation method:  Syringe    Visualized foreign bodies/material removed: no      Debridement:  Minimal    Undermining:  None    Scar revision: no    Skin repair:     Repair method:  Staples    Number of staples:  1  Approximation:     Approximation:  Close  Repair type:     Repair type:  Simple  Post-procedure details:     Dressing:  Non-adherent dressing    Procedure completion:  Tolerated  Labs Reviewed - No data to display       Imaging Results              CT Head Without Contrast (Final result)  Result time 07/26/23 07:26:54      Final result by Boone Lowe MD (07/26/23 07:26:54)                   Impression:      1. No acute intracranial findings.  2. No acute cervical spine fracture.  3. Degenerative findings in the cervical spine, as discussed.  4. Redemonstrated left thyroid lobe nodule, for which further characterization with nonemergent ultrasound would be recommended, if not previously evaluated elsewhere.  Findings grossly similar to prior CT cervical spine 07/07/2022.      Electronically signed by: Boone Lowe  Date:    07/26/2023  Time:    07:26               Narrative:    EXAMINATION:  CT HEAD WITHOUT CONTRAST; CT CERVICAL SPINE WITHOUT CONTRAST    CLINICAL HISTORY:  Head trauma, minor (Age >= 65y);; Neck trauma (Age >= 65y);    TECHNIQUE:  Low dose axial images were obtained through the head and cervical spine.  Coronal and sagittal reformations were also performed. Contrast was not administered.    COMPARISON:  CT head 04/27/2023.  CT cervical spine 07/07/2022.    FINDINGS:  Head:    Blood: No acute intracranial hemorrhage.    Parenchyma: No definite loss of gray-white differentiation to suggest acute or subacute transcortical infarct. Parenchymal volume loss, with notable atrophy of the mesial temporal lobe structures.  Nonspecific areas of white matter  hypoattenuation may relate to sequela of chronic small vessel ischemic disease.    Ventricles/Extra-axial spaces: No abnormal extra-axial fluid collection. Basal cisterns are patent.    Vessels: Heavy atherosclerotic calcification    Orbits: Status post bilateral lens replacements.    Scalp: Question small anterior scalp soft tissue contusion.    Skull: There are no depressed skull fractures or destructive bone lesions.    Sinuses and mastoids: Scattered relatively modest paranasal sinus mucosal thickening    Other findings: Soft tissue attenuation within the external auditory canals may relate to cerumen.    Cervical spine:    Fractures: No acute fractures    Alignment: Grade 1 anterolisthesis of C4 on C5, favored degenerative.  Atlanto-axial and atlanto-occipital joints: Atlanto-axial and atlanto-occipital intervals are not widened.  Facet joints: There is no traumatic facet joint widening.  Multilevel degenerative set arthropathy.  Vertebral bodies: Query osseous demineralization.  Degenerative endplate change.  Discs: Degenerative disc disease.  Spinal canal and foraminal narrowing: Although CT does not optimally evaluate the soft tissue contents of the spinal canal and foramina, no critical stenosis is suggested.    At C2-3, moderate central spinal canal stenosis and moderate to severe left foraminal narrowing    At C3-4, severe spinal canal stenosis and moderate bilateral foraminal narrowing    At C4-5, mild-to-moderate central spinal canal stenosis and severe bilateral foraminal narrowing    At C5-6, mild-to-moderate central spinal canal stenosis and moderate bilateral foraminal narrowing    At C6-7, mild to moderate central spinal canal stenosis and moderate bilateral foraminal narrowing  Paraspinal soft tissues: Heterogeneity of the thyroid gland.  Dominant 18 mm hypodense nodule in the left thyroid lobe, as before.  Additional calcified noncalcified smaller nodules are noted elsewhere in both  lobes.    Upper Lungs:Clear                                       CT Cervical Spine Without Contrast (Final result)  Result time 07/26/23 07:26:54      Final result by Boone Lowe MD (07/26/23 07:26:54)                   Impression:      1. No acute intracranial findings.  2. No acute cervical spine fracture.  3. Degenerative findings in the cervical spine, as discussed.  4. Redemonstrated left thyroid lobe nodule, for which further characterization with nonemergent ultrasound would be recommended, if not previously evaluated elsewhere.  Findings grossly similar to prior CT cervical spine 07/07/2022.      Electronically signed by: Boone Lowe  Date:    07/26/2023  Time:    07:26               Narrative:    EXAMINATION:  CT HEAD WITHOUT CONTRAST; CT CERVICAL SPINE WITHOUT CONTRAST    CLINICAL HISTORY:  Head trauma, minor (Age >= 65y);; Neck trauma (Age >= 65y);    TECHNIQUE:  Low dose axial images were obtained through the head and cervical spine.  Coronal and sagittal reformations were also performed. Contrast was not administered.    COMPARISON:  CT head 04/27/2023.  CT cervical spine 07/07/2022.    FINDINGS:  Head:    Blood: No acute intracranial hemorrhage.    Parenchyma: No definite loss of gray-white differentiation to suggest acute or subacute transcortical infarct. Parenchymal volume loss, with notable atrophy of the mesial temporal lobe structures.  Nonspecific areas of white matter hypoattenuation may relate to sequela of chronic small vessel ischemic disease.    Ventricles/Extra-axial spaces: No abnormal extra-axial fluid collection. Basal cisterns are patent.    Vessels: Heavy atherosclerotic calcification    Orbits: Status post bilateral lens replacements.    Scalp: Question small anterior scalp soft tissue contusion.    Skull: There are no depressed skull fractures or destructive bone lesions.    Sinuses and mastoids: Scattered relatively modest paranasal sinus mucosal thickening    Other  findings: Soft tissue attenuation within the external auditory canals may relate to cerumen.    Cervical spine:    Fractures: No acute fractures    Alignment: Grade 1 anterolisthesis of C4 on C5, favored degenerative.  Atlanto-axial and atlanto-occipital joints: Atlanto-axial and atlanto-occipital intervals are not widened.  Facet joints: There is no traumatic facet joint widening.  Multilevel degenerative set arthropathy.  Vertebral bodies: Query osseous demineralization.  Degenerative endplate change.  Discs: Degenerative disc disease.  Spinal canal and foraminal narrowing: Although CT does not optimally evaluate the soft tissue contents of the spinal canal and foramina, no critical stenosis is suggested.    At C2-3, moderate central spinal canal stenosis and moderate to severe left foraminal narrowing    At C3-4, severe spinal canal stenosis and moderate bilateral foraminal narrowing    At C4-5, mild-to-moderate central spinal canal stenosis and severe bilateral foraminal narrowing    At C5-6, mild-to-moderate central spinal canal stenosis and moderate bilateral foraminal narrowing    At C6-7, mild to moderate central spinal canal stenosis and moderate bilateral foraminal narrowing  Paraspinal soft tissues: Heterogeneity of the thyroid gland.  Dominant 18 mm hypodense nodule in the left thyroid lobe, as before.  Additional calcified noncalcified smaller nodules are noted elsewhere in both lobes.    Upper Lungs:Clear                                    X-Rays:   Independently Interpreted Readings:   Head CT: No hemorrhage.  No skull fracture.   Medications   acetaminophen tablet 1,000 mg (1,000 mg Oral Given 7/26/23 0809)     Medical Decision Making:   History:   I obtained history from: EMS provider.       <> Summary of History: No syncope.  Mechanical fall per EMS  Old Medical Records: I decided to obtain old medical records.  Old Records Summarized: records from clinic visits, other records, records from another  hospital and records from previous admission(s).       <> Summary of Records: Skilled nursing facility resident with history of dementia.  No history of anticoagulation.  Initial Assessment:   Unable to find discrete laceration requiring repair.  Will obtain imaging of brain and cervical spine given advanced age and dementia.  Independently Interpreted Test(s):   I have ordered and independently interpreted X-rays - see prior notes.  I have ordered and independently interpreted EKG Reading(s) - see prior notes  Clinical Tests:   Radiological Study: Ordered and Reviewed  ED Management:  Imaging is unremarkable for acute intracranial or cervical spine injury.  After taking the patient's brain that was able to visualize the laceration.  She is a 1 cm deep laceration that does not involve the galea.  Minimal debridement to remove hair follicle material from laceration.  Wound appeared to be through a prior scar an injury.  Hemostasis achieved with direct pressure.  Single staple placed with good result.  Patient dispositioned back to nursing facility    See ED course for additional HPI, ROS, PE, lab, imaging, consultant discussion, procedure interpretation, and Medical Decision Making.             ED Course as of 07/27/23 1809 Wed Jul 26, 2023   0829 Patient's latosha taken down and a 1 cm laceration with active bleeding was noted.  Non-pulsatile.  No visible galea.  Single staple placed through laceration with good hemostasis. [DS]   0829 Imaging is without acute intracranial hemorrhage or skull fracture or acute cervical spine injury.  Plan to disposition back to skilled nursing facility [DS]      ED Course User Index  [DS] Obi Spencer MD                 Clinical Impression:   Final diagnoses:  [S09.90XA] Closed head injury, initial encounter (Primary)  [S01.01XA] Laceration of scalp, initial encounter  [S40.011A] Contusion of right shoulder, initial encounter        ED Disposition Condition    Discharge  Stable          ED Prescriptions    None       Follow-up Information    None          Obi Spencer MD  07/27/23 1717

## 2023-07-26 NOTE — ED NOTES
Lesa Holder, a 89 y.o. female presents to the ED w/ complaint of having a fall in nursing home. Patient currently has a laceration on the right side of her head. Patient has been connected to cardiac monitoring, blood pressure cuff and pulse ox. Denies headache.     Triage note:  Chief Complaint   Patient presents with    Fall    Laceration     Pt arrives via EMS from [facility] after falling and having laceration to right side of face.     Review of patient's allergies indicates:  No Known Allergies  Past Medical History:   Diagnosis Date    Cataract 1998    remove from left eye    Dementia     Depression     GERD (gastroesophageal reflux disease)     Hypertension

## 2023-07-26 NOTE — ED NOTES
Patient provided with lunch at this time. Discharge still pending transportation back to nursing home. Charge RN aware.

## 2023-07-26 NOTE — ED NOTES
Assumed care and report received. Patient discharged - pending transportation back to [facility]. Charge RN aware.  Plan of care discussed with patient - verbalized understanding. Patient resting comfortably in ED stretcher. Denies new complaints at this time.

## 2025-01-30 ENCOUNTER — TELEPHONE (OUTPATIENT)
Dept: FAMILY MEDICINE | Facility: CLINIC | Age: OVER 89
End: 2025-01-30
Payer: MEDICARE

## (undated) DEVICE — RELOAD PROXIMATE CUT BLUE 55MM

## (undated) DEVICE — SUT SILK 0 STRANDS 30IN BLK

## (undated) DEVICE — DRESSING TRANS 4X4 3/4

## (undated) DEVICE — DRAPE FLUID WARMER ORS 44X44IN

## (undated) DEVICE — SEE MEDLINE ITEM 157117

## (undated) DEVICE — COVER OVERHEAD SURG LT BLUE

## (undated) DEVICE — SUT 2/0 30IN SILK BLK BRAI

## (undated) DEVICE — MANIFOLD 4 PORT

## (undated) DEVICE — SUT SILK 2-0 STRANDS 30IN

## (undated) DEVICE — CUTTER PROXIMATE BLUE 55MM

## (undated) DEVICE — SUPPORT ULNA NERVE PROTECTOR

## (undated) DEVICE — ELECTRODE REM PLYHSV RETURN 9

## (undated) DEVICE — CONTAINER PATHOLOGY W/LID 32OZ

## (undated) DEVICE — TIP SUCTION YANKAUER

## (undated) DEVICE — SEE MEDLINE ITEM 156955

## (undated) DEVICE — APPLICATOR CHLORAPREP ORN 26ML

## (undated) DEVICE — GAUZE SPONGE 4X4 12PLY

## (undated) DEVICE — ELECTRODE BLD 1 INCH TEFLON

## (undated) DEVICE — STAPLER SKIN ROTATING HEAD

## (undated) DEVICE — SUT CTD VICRYL 3-0 CR/SH

## (undated) DEVICE — SPONGE LAP 18X18 PREWASHED

## (undated) DEVICE — PACK DRAPE UNIVERSAL CONVERTOR

## (undated) DEVICE — SEE MEDLINE ITEM 157116

## (undated) DEVICE — DRESSING XEROFORM 1X8IN

## (undated) DEVICE — DRESSING XEROFORM FOIL PK 1X8

## (undated) DEVICE — SEALER LIGASURE OPEN 5MM 23CM

## (undated) DEVICE — TRAY FOLEY 16FR INFECTION CONT

## (undated) DEVICE — GLOVE SURGICAL LATEX SZ 7

## (undated) DEVICE — CLIP LIGATION MEDIUM CLIPS 1

## (undated) DEVICE — SUT 1 48IN PDS II VIO MONO